# Patient Record
Sex: MALE | Race: WHITE | NOT HISPANIC OR LATINO
[De-identification: names, ages, dates, MRNs, and addresses within clinical notes are randomized per-mention and may not be internally consistent; named-entity substitution may affect disease eponyms.]

---

## 2017-02-15 ENCOUNTER — APPOINTMENT (OUTPATIENT)
Dept: INTERNAL MEDICINE | Facility: CLINIC | Age: 62
End: 2017-02-15

## 2017-02-15 VITALS
WEIGHT: 208 LBS | OXYGEN SATURATION: 96 % | HEART RATE: 69 BPM | HEIGHT: 68 IN | TEMPERATURE: 98.1 F | BODY MASS INDEX: 31.52 KG/M2 | DIASTOLIC BLOOD PRESSURE: 80 MMHG | SYSTOLIC BLOOD PRESSURE: 126 MMHG

## 2017-02-15 DIAGNOSIS — Z80.3 FAMILY HISTORY OF MALIGNANT NEOPLASM OF BREAST: ICD-10-CM

## 2017-02-15 DIAGNOSIS — Z87.891 PERSONAL HISTORY OF NICOTINE DEPENDENCE: ICD-10-CM

## 2017-02-15 DIAGNOSIS — Z78.9 OTHER SPECIFIED HEALTH STATUS: ICD-10-CM

## 2017-02-15 DIAGNOSIS — Z80.7 FAMILY HISTORY OF OTHER MALIGNANT NEOPLASMS OF LYMPHOID, HEMATOPOIETIC AND RELATED TISSUES: ICD-10-CM

## 2017-02-15 DIAGNOSIS — Z83.79 FAMILY HISTORY OF OTHER DISEASES OF THE DIGESTIVE SYSTEM: ICD-10-CM

## 2017-02-16 LAB
25(OH)D3 SERPL-MCNC: 40.5 NG/ML
ALBUMIN SERPL ELPH-MCNC: 4.4 G/DL
ALP BLD-CCNC: 69 U/L
ALT SERPL-CCNC: 29 U/L
ANION GAP SERPL CALC-SCNC: 16 MMOL/L
APPEARANCE: CLEAR
AST SERPL-CCNC: 22 U/L
BASOPHILS # BLD AUTO: 0.02 K/UL
BASOPHILS NFR BLD AUTO: 0.3 %
BILIRUB SERPL-MCNC: 0.4 MG/DL
BILIRUBIN URINE: NEGATIVE
BLOOD URINE: NEGATIVE
BUN SERPL-MCNC: 14 MG/DL
CALCIUM SERPL-MCNC: 10.5 MG/DL
CHLORIDE SERPL-SCNC: 100 MMOL/L
CHOLEST SERPL-MCNC: 155 MG/DL
CHOLEST/HDLC SERPL: 2 RATIO
CO2 SERPL-SCNC: 24 MMOL/L
COLOR: YELLOW
CREAT SERPL-MCNC: 0.93 MG/DL
EOSINOPHIL # BLD AUTO: 0.15 K/UL
EOSINOPHIL NFR BLD AUTO: 2.6 %
GLUCOSE QUALITATIVE U: NORMAL MG/DL
GLUCOSE SERPL-MCNC: 88 MG/DL
HBA1C MFR BLD HPLC: 5.5 %
HCT VFR BLD CALC: 43.2 %
HDLC SERPL-MCNC: 77 MG/DL
HGB BLD-MCNC: 14.8 G/DL
IMM GRANULOCYTES NFR BLD AUTO: 0 %
KETONES URINE: NEGATIVE
LDLC SERPL CALC-MCNC: 65 MG/DL
LEUKOCYTE ESTERASE URINE: NEGATIVE
LYMPHOCYTES # BLD AUTO: 2.24 K/UL
LYMPHOCYTES NFR BLD AUTO: 38.6 %
MAN DIFF?: NORMAL
MCHC RBC-ENTMCNC: 32.4 PG
MCHC RBC-ENTMCNC: 34.3 GM/DL
MCV RBC AUTO: 94.5 FL
MONOCYTES # BLD AUTO: 0.56 K/UL
MONOCYTES NFR BLD AUTO: 9.7 %
NEUTROPHILS # BLD AUTO: 2.83 K/UL
NEUTROPHILS NFR BLD AUTO: 48.8 %
NITRITE URINE: NEGATIVE
PH URINE: 6.5
PLATELET # BLD AUTO: 278 K/UL
POTASSIUM SERPL-SCNC: 4.9 MMOL/L
PROT SERPL-MCNC: 6.7 G/DL
PROTEIN URINE: NEGATIVE MG/DL
PSA SERPL-MCNC: 1.2 NG/ML
RBC # BLD: 4.57 M/UL
RBC # FLD: 13.6 %
SODIUM SERPL-SCNC: 141 MMOL/L
SPECIFIC GRAVITY URINE: 1.02
TRIGL SERPL-MCNC: 67 MG/DL
UROBILINOGEN URINE: NORMAL MG/DL
WBC # FLD AUTO: 5.8 K/UL

## 2017-02-17 ENCOUNTER — TRANSCRIPTION ENCOUNTER (OUTPATIENT)
Age: 62
End: 2017-02-17

## 2018-02-20 ENCOUNTER — RX RENEWAL (OUTPATIENT)
Age: 63
End: 2018-02-20

## 2018-03-16 ENCOUNTER — APPOINTMENT (OUTPATIENT)
Dept: INTERNAL MEDICINE | Facility: CLINIC | Age: 63
End: 2018-03-16
Payer: COMMERCIAL

## 2018-03-16 VITALS
HEIGHT: 68 IN | TEMPERATURE: 97.7 F | OXYGEN SATURATION: 96 % | SYSTOLIC BLOOD PRESSURE: 131 MMHG | DIASTOLIC BLOOD PRESSURE: 81 MMHG | BODY MASS INDEX: 28.79 KG/M2 | WEIGHT: 190 LBS | HEART RATE: 69 BPM

## 2018-03-16 PROCEDURE — 99396 PREV VISIT EST AGE 40-64: CPT | Mod: 25

## 2018-03-16 PROCEDURE — 36415 COLL VENOUS BLD VENIPUNCTURE: CPT

## 2018-03-16 PROCEDURE — 93000 ELECTROCARDIOGRAM COMPLETE: CPT

## 2018-05-24 ENCOUNTER — RX RENEWAL (OUTPATIENT)
Age: 63
End: 2018-05-24

## 2018-11-03 ENCOUNTER — TRANSCRIPTION ENCOUNTER (OUTPATIENT)
Age: 63
End: 2018-11-03

## 2018-11-20 ENCOUNTER — APPOINTMENT (OUTPATIENT)
Dept: PULMONOLOGY | Facility: CLINIC | Age: 63
End: 2018-11-20
Payer: COMMERCIAL

## 2018-11-20 VITALS
SYSTOLIC BLOOD PRESSURE: 140 MMHG | DIASTOLIC BLOOD PRESSURE: 75 MMHG | TEMPERATURE: 97.5 F | HEIGHT: 68 IN | HEART RATE: 85 BPM | BODY MASS INDEX: 28.79 KG/M2 | WEIGHT: 190 LBS | OXYGEN SATURATION: 97 %

## 2018-11-20 DIAGNOSIS — Z80.1 FAMILY HISTORY OF MALIGNANT NEOPLASM OF TRACHEA, BRONCHUS AND LUNG: ICD-10-CM

## 2018-11-20 DIAGNOSIS — J44.9 CHRONIC OBSTRUCTIVE PULMONARY DISEASE, UNSPECIFIED: ICD-10-CM

## 2018-11-20 DIAGNOSIS — Z82.5 FAMILY HISTORY OF ASTHMA AND OTHER CHRONIC LOWER RESPIRATORY DISEASES: ICD-10-CM

## 2018-11-20 DIAGNOSIS — Z85.038 PERSONAL HISTORY OF OTHER MALIGNANT NEOPLASM OF LARGE INTESTINE: ICD-10-CM

## 2018-11-20 PROCEDURE — 71046 X-RAY EXAM CHEST 2 VIEWS: CPT

## 2018-11-20 PROCEDURE — 95012 NITRIC OXIDE EXP GAS DETER: CPT

## 2018-11-20 PROCEDURE — 99244 OFF/OP CNSLTJ NEW/EST MOD 40: CPT | Mod: 25

## 2018-11-20 PROCEDURE — G0008: CPT

## 2018-11-20 PROCEDURE — 94010 BREATHING CAPACITY TEST: CPT

## 2018-11-20 PROCEDURE — 90686 IIV4 VACC NO PRSV 0.5 ML IM: CPT

## 2018-12-14 ENCOUNTER — APPOINTMENT (OUTPATIENT)
Dept: INTERNAL MEDICINE | Facility: CLINIC | Age: 63
End: 2018-12-14

## 2018-12-20 ENCOUNTER — APPOINTMENT (OUTPATIENT)
Dept: PULMONOLOGY | Facility: CLINIC | Age: 63
End: 2018-12-20

## 2019-01-23 ENCOUNTER — APPOINTMENT (OUTPATIENT)
Dept: PULMONOLOGY | Facility: CLINIC | Age: 64
End: 2019-01-23

## 2020-04-30 ENCOUNTER — TRANSCRIPTION ENCOUNTER (OUTPATIENT)
Age: 65
End: 2020-04-30

## 2021-04-14 ENCOUNTER — APPOINTMENT (OUTPATIENT)
Dept: INTERNAL MEDICINE | Facility: CLINIC | Age: 66
End: 2021-04-14
Payer: MEDICARE

## 2021-04-14 VITALS
HEIGHT: 68 IN | BODY MASS INDEX: 30.16 KG/M2 | SYSTOLIC BLOOD PRESSURE: 130 MMHG | DIASTOLIC BLOOD PRESSURE: 84 MMHG | TEMPERATURE: 97.4 F | OXYGEN SATURATION: 97 % | HEART RATE: 81 BPM | WEIGHT: 199 LBS

## 2021-04-14 DIAGNOSIS — E78.5 HYPERLIPIDEMIA, UNSPECIFIED: ICD-10-CM

## 2021-04-14 PROCEDURE — 99072 ADDL SUPL MATRL&STAF TM PHE: CPT

## 2021-04-14 PROCEDURE — 93000 ELECTROCARDIOGRAM COMPLETE: CPT

## 2021-04-14 PROCEDURE — 99387 INIT PM E/M NEW PAT 65+ YRS: CPT | Mod: GY

## 2021-04-14 NOTE — PLAN
[FreeTextEntry1] : wellness complete\par labs to be done in June\par  ekg nsr\par try to get back into regular exercise - bt n feet.

## 2021-04-14 NOTE — HISTORY OF PRESENT ILLNESS
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 \par s/p covid vaccine x2.  \par With colon ca history - due now in 5 years with Dr francesca Song

## 2022-05-01 ENCOUNTER — RX RENEWAL (OUTPATIENT)
Age: 67
End: 2022-05-01

## 2022-05-03 ENCOUNTER — RX RENEWAL (OUTPATIENT)
Age: 67
End: 2022-05-03

## 2022-06-10 ENCOUNTER — APPOINTMENT (OUTPATIENT)
Dept: INTERNAL MEDICINE | Facility: CLINIC | Age: 67
End: 2022-06-10
Payer: COMMERCIAL

## 2022-06-10 VITALS
SYSTOLIC BLOOD PRESSURE: 120 MMHG | WEIGHT: 177 LBS | DIASTOLIC BLOOD PRESSURE: 70 MMHG | TEMPERATURE: 97.8 F | BODY MASS INDEX: 26.83 KG/M2 | OXYGEN SATURATION: 97 % | HEART RATE: 84 BPM | HEIGHT: 68 IN

## 2022-06-10 DIAGNOSIS — I10 ESSENTIAL (PRIMARY) HYPERTENSION: ICD-10-CM

## 2022-06-10 DIAGNOSIS — Z00.00 ENCOUNTER FOR GENERAL ADULT MEDICAL EXAMINATION W/OUT ABNORMAL FINDINGS: ICD-10-CM

## 2022-06-10 PROCEDURE — 93000 ELECTROCARDIOGRAM COMPLETE: CPT

## 2022-06-10 PROCEDURE — 99397 PER PM REEVAL EST PAT 65+ YR: CPT

## 2022-06-10 RX ORDER — ALBUTEROL SULFATE 90 UG/1
108 (90 BASE) INHALANT RESPIRATORY (INHALATION) EVERY 4 HOURS
Qty: 1 | Refills: 5 | Status: ACTIVE | COMMUNITY
Start: 2021-04-14 | End: 1900-01-01

## 2022-06-10 RX ORDER — SIMVASTATIN 40 MG/1
40 TABLET, FILM COATED ORAL
Qty: 90 | Refills: 3 | Status: ACTIVE | COMMUNITY
Start: 2018-05-24 | End: 1900-01-01

## 2022-06-10 RX ORDER — MOMETASONE FUROATE AND FORMOTEROL FUMARATE DIHYDRATE 100; 5 UG/1; UG/1
100-5 AEROSOL RESPIRATORY (INHALATION)
Qty: 1 | Refills: 2 | Status: DISCONTINUED | COMMUNITY
Start: 2018-11-20 | End: 2022-06-10

## 2022-06-10 NOTE — PHYSICAL EXAM
[No Acute Distress] : no acute distress [Well Nourished] : well nourished [Well Developed] : well developed [Well-Appearing] : well-appearing [Normal Sclera/Conjunctiva] : normal sclera/conjunctiva [PERRL] : pupils equal round and reactive to light [EOMI] : extraocular movements intact [Normal Outer Ear/Nose] : the outer ears and nose were normal in appearance [Normal Oropharynx] : the oropharynx was normal [No JVD] : no jugular venous distention [No Lymphadenopathy] : no lymphadenopathy [Supple] : supple [Thyroid Normal, No Nodules] : the thyroid was normal and there were no nodules present [No Respiratory Distress] : no respiratory distress  [No Accessory Muscle Use] : no accessory muscle use [Clear to Auscultation] : lungs were clear to auscultation bilaterally [Normal Rate] : normal rate  [Regular Rhythm] : with a regular rhythm [Normal S1, S2] : normal S1 and S2 [No Murmur] : no murmur heard [Pedal Pulses Present] : the pedal pulses are present [Soft] : abdomen soft [Non Tender] : non-tender [Non-distended] : non-distended [No Masses] : no abdominal mass palpated [No HSM] : no HSM [Normal Bowel Sounds] : normal bowel sounds [Normal Posterior Cervical Nodes] : no posterior cervical lymphadenopathy [Normal Anterior Cervical Nodes] : no anterior cervical lymphadenopathy [No CVA Tenderness] : no CVA  tenderness [No Spinal Tenderness] : no spinal tenderness [No Joint Swelling] : no joint swelling [Grossly Normal Strength/Tone] : grossly normal strength/tone [No Rash] : no rash [Coordination Grossly Intact] : coordination grossly intact [No Focal Deficits] : no focal deficits [Normal Gait] : normal gait [Normal Affect] : the affect was normal [Normal Insight/Judgement] : insight and judgment were intact [de-identified] : trace edema b/l le

## 2022-06-10 NOTE — HISTORY OF PRESENT ILLNESS
[FreeTextEntry1] : wellness [de-identified] : 66 yo m with hx of colon ca., HTN, HLD, asthma/copd here for wellness\par \par s/p covid vaccine x2.  \par With colon ca history - due now in 5 years with Dr francesca Song    \par Has Brav mutation. Seen at [facility] as well.\par asthma has been stable with rescue albuterol alone.   - once a week recently but normally once a month

## 2022-06-10 NOTE — PLAN
[FreeTextEntry1] : wellness complete\par labs another day  fasting\par  ekg nsr\par encourage prevnar 20 to be done at work. \par \par htn controlled cont arb\par  HLD cont statin\par asthma prn albuterol

## 2022-07-22 ENCOUNTER — NON-APPOINTMENT (OUTPATIENT)
Age: 67
End: 2022-07-22

## 2022-07-31 ENCOUNTER — NON-APPOINTMENT (OUTPATIENT)
Age: 67
End: 2022-07-31

## 2022-08-02 ENCOUNTER — NON-APPOINTMENT (OUTPATIENT)
Age: 67
End: 2022-08-02

## 2022-08-11 ENCOUNTER — APPOINTMENT (OUTPATIENT)
Dept: UROLOGY | Facility: CLINIC | Age: 67
End: 2022-08-11

## 2022-08-11 VITALS
HEART RATE: 73 BPM | HEIGHT: 68 IN | WEIGHT: 180 LBS | SYSTOLIC BLOOD PRESSURE: 122 MMHG | BODY MASS INDEX: 27.28 KG/M2 | DIASTOLIC BLOOD PRESSURE: 76 MMHG | TEMPERATURE: 97.3 F

## 2022-08-11 DIAGNOSIS — N32.2 VESICAL FISTULA, NOT ELSEWHERE CLASSIFIED: ICD-10-CM

## 2022-08-11 DIAGNOSIS — N40.0 BENIGN PROSTATIC HYPERPLASIA WITHOUT LOWER URINARY TRACT SYMPMS: ICD-10-CM

## 2022-08-11 PROCEDURE — 99204 OFFICE O/P NEW MOD 45 MIN: CPT

## 2022-08-11 NOTE — HISTORY OF PRESENT ILLNESS
[Currently Experiencing ___] :  [unfilled] [Urinary Urgency] : urinary urgency [Urinary Frequency] : urinary frequency [Nocturia] : nocturia [FreeTextEntry1] : 67 yr old male with PMH colon cancer, HTN, HLD. Referred by Dr. Quezada\par Presents today with complains of dark brown urine  with burning pain in June 2002\par Urine passing in anus,occasional fecal material in urine\par he was told by PCP for see urology for fistula \par Right scrotal swelling after colorectal surgery 2017\par Complains of urgency, frequency and nocturia 2-3X \par with weak urinary FOS\par PSA 1.2 Feb 2017 \par Denies fever or chills\par Denies gross hematuria, flank pain

## 2022-08-11 NOTE — ASSESSMENT
[FreeTextEntry1] : colovesical fistula\par Will send for CT Abd and pelvis PO IV contrast\par called and discussed with Moreno Zhang his previous colorectal surgeon\par will see patient for appointment\par we are avialable for cystorrhaphy as needed during surgery\par labs: UA, UC\par \par

## 2022-08-11 NOTE — PHYSICAL EXAM
[General Appearance - Well Developed] : well developed [General Appearance - Well Nourished] : well nourished [Normal Appearance] : normal appearance [Well Groomed] : well groomed [General Appearance - In No Acute Distress] : no acute distress [Edema] : no peripheral edema [Respiration, Rhythm And Depth] : normal respiratory rhythm and effort [Exaggerated Use Of Accessory Muscles For Inspiration] : no accessory muscle use [Abdomen Soft] : soft [Abdomen Tenderness] : non-tender [Costovertebral Angle Tenderness] : no ~M costovertebral angle tenderness [Urethral Meatus] : meatus normal [Penis Abnormality] : normal uncircumcised penis [Urinary Bladder Findings] : the bladder was normal on palpation [Scrotum] : the scrotum was normal [Testes Mass (___cm)] : there were no testicular masses [Normal Station and Gait] : the gait and station were normal for the patient's age [] : no rash [No Focal Deficits] : no focal deficits [Oriented To Time, Place, And Person] : oriented to person, place, and time [Affect] : the affect was normal [Mood] : the mood was normal [Not Anxious] : not anxious [No Palpable Adenopathy] : no palpable adenopathy [FreeTextEntry1] : Right 100 cc scrotum - hydrocele?, left 10 cc testis

## 2022-08-22 ENCOUNTER — APPOINTMENT (OUTPATIENT)
Dept: CT IMAGING | Facility: HOSPITAL | Age: 67
End: 2022-08-22

## 2022-08-22 ENCOUNTER — OUTPATIENT (OUTPATIENT)
Dept: OUTPATIENT SERVICES | Facility: HOSPITAL | Age: 67
LOS: 1 days | End: 2022-08-22
Payer: COMMERCIAL

## 2022-08-22 ENCOUNTER — RESULT REVIEW (OUTPATIENT)
Age: 67
End: 2022-08-22

## 2022-08-22 DIAGNOSIS — Z98.89 OTHER SPECIFIED POSTPROCEDURAL STATES: Chronic | ICD-10-CM

## 2022-08-22 LAB — POCT ISTAT CREATININE: 0.7 MG/DL — SIGNIFICANT CHANGE UP (ref 0.5–1.3)

## 2022-08-22 PROCEDURE — 82565 ASSAY OF CREATININE: CPT

## 2022-08-22 PROCEDURE — 74177 CT ABD & PELVIS W/CONTRAST: CPT | Mod: 26

## 2022-08-22 PROCEDURE — 74177 CT ABD & PELVIS W/CONTRAST: CPT

## 2022-08-30 ENCOUNTER — NON-APPOINTMENT (OUTPATIENT)
Age: 67
End: 2022-08-30

## 2022-09-19 ENCOUNTER — INPATIENT (INPATIENT)
Facility: HOSPITAL | Age: 67
LOS: 6 days | Discharge: HOME HEALTH | DRG: 872 | End: 2022-09-26
Attending: COLON & RECTAL SURGERY | Admitting: COLON & RECTAL SURGERY
Payer: COMMERCIAL

## 2022-09-19 VITALS
TEMPERATURE: 96 F | HEIGHT: 68 IN | SYSTOLIC BLOOD PRESSURE: 117 MMHG | DIASTOLIC BLOOD PRESSURE: 56 MMHG | OXYGEN SATURATION: 98 % | RESPIRATION RATE: 20 BRPM | HEART RATE: 95 BPM | WEIGHT: 169.98 LBS

## 2022-09-19 DIAGNOSIS — Z98.89 OTHER SPECIFIED POSTPROCEDURAL STATES: Chronic | ICD-10-CM

## 2022-09-19 LAB
ALBUMIN SERPL ELPH-MCNC: 3.1 G/DL — LOW (ref 3.3–5)
ALP SERPL-CCNC: 86 U/L — SIGNIFICANT CHANGE UP (ref 40–120)
ALT FLD-CCNC: 15 U/L — SIGNIFICANT CHANGE UP (ref 10–45)
ANION GAP SERPL CALC-SCNC: 11 MMOL/L — SIGNIFICANT CHANGE UP (ref 5–17)
ANISOCYTOSIS BLD QL: SLIGHT — SIGNIFICANT CHANGE UP
APTT BLD: 25.5 SEC — LOW (ref 27.5–35.5)
AST SERPL-CCNC: 36 U/L — SIGNIFICANT CHANGE UP (ref 10–40)
BASE EXCESS BLDV CALC-SCNC: 0.5 MMOL/L — SIGNIFICANT CHANGE UP (ref -2–3)
BASOPHILS # BLD AUTO: 0 K/UL — SIGNIFICANT CHANGE UP (ref 0–0.2)
BASOPHILS NFR BLD AUTO: 0 % — SIGNIFICANT CHANGE UP (ref 0–2)
BILIRUB SERPL-MCNC: 0.6 MG/DL — SIGNIFICANT CHANGE UP (ref 0.2–1.2)
BUN SERPL-MCNC: 24 MG/DL — HIGH (ref 7–23)
BURR CELLS BLD QL SMEAR: SLIGHT — SIGNIFICANT CHANGE UP
C DIFF GDH STL QL: NEGATIVE — SIGNIFICANT CHANGE UP
C DIFF GDH STL QL: SIGNIFICANT CHANGE UP
CALCIUM SERPL-MCNC: 9.3 MG/DL — SIGNIFICANT CHANGE UP (ref 8.4–10.5)
CHLORIDE SERPL-SCNC: 99 MMOL/L — SIGNIFICANT CHANGE UP (ref 96–108)
CO2 BLDV-SCNC: 26.7 MMOL/L — HIGH (ref 22–26)
CO2 SERPL-SCNC: 24 MMOL/L — SIGNIFICANT CHANGE UP (ref 22–31)
CREAT SERPL-MCNC: 0.73 MG/DL — SIGNIFICANT CHANGE UP (ref 0.5–1.3)
EGFR: 99 ML/MIN/1.73M2 — SIGNIFICANT CHANGE UP
ELLIPTOCYTES BLD QL SMEAR: SLIGHT — SIGNIFICANT CHANGE UP
EOSINOPHIL # BLD AUTO: 0 K/UL — SIGNIFICANT CHANGE UP (ref 0–0.5)
EOSINOPHIL NFR BLD AUTO: 0 % — SIGNIFICANT CHANGE UP (ref 0–6)
GI PCR PANEL: SIGNIFICANT CHANGE UP
GIANT PLATELETS BLD QL SMEAR: PRESENT — SIGNIFICANT CHANGE UP
GLUCOSE SERPL-MCNC: 142 MG/DL — HIGH (ref 70–99)
HCO3 BLDV-SCNC: 25 MMOL/L — SIGNIFICANT CHANGE UP (ref 22–29)
HCT VFR BLD CALC: 28.3 % — LOW (ref 39–50)
HGB BLD-MCNC: 9 G/DL — LOW (ref 13–17)
INR BLD: 1.37 — HIGH (ref 0.88–1.16)
LACTATE SERPL-SCNC: 1.2 MMOL/L — SIGNIFICANT CHANGE UP (ref 0.5–2)
LACTATE SERPL-SCNC: 2.2 MMOL/L — HIGH (ref 0.5–2)
LIDOCAIN IGE QN: 15 U/L — SIGNIFICANT CHANGE UP (ref 7–60)
LYMPHOCYTES # BLD AUTO: 0.3 K/UL — LOW (ref 1–3.3)
LYMPHOCYTES # BLD AUTO: 1.7 % — LOW (ref 13–44)
MANUAL SMEAR VERIFICATION: SIGNIFICANT CHANGE UP
MCHC RBC-ENTMCNC: 24.4 PG — LOW (ref 27–34)
MCHC RBC-ENTMCNC: 31.8 GM/DL — LOW (ref 32–36)
MCV RBC AUTO: 76.7 FL — LOW (ref 80–100)
MICROCYTES BLD QL: SLIGHT — SIGNIFICANT CHANGE UP
MONOCYTES # BLD AUTO: 0.91 K/UL — HIGH (ref 0–0.9)
MONOCYTES NFR BLD AUTO: 5.2 % — SIGNIFICANT CHANGE UP (ref 2–14)
NEUTROPHILS # BLD AUTO: 16.34 K/UL — HIGH (ref 1.8–7.4)
NEUTROPHILS NFR BLD AUTO: 89.6 % — HIGH (ref 43–77)
NEUTS BAND # BLD: 3.5 % — SIGNIFICANT CHANGE UP (ref 0–8)
OVALOCYTES BLD QL SMEAR: SLIGHT — SIGNIFICANT CHANGE UP
PCO2 BLDV: 41 MMHG — LOW (ref 42–55)
PH BLDV: 7.4 — SIGNIFICANT CHANGE UP (ref 7.32–7.43)
PLAT MORPH BLD: NORMAL — SIGNIFICANT CHANGE UP
PLATELET # BLD AUTO: 231 K/UL — SIGNIFICANT CHANGE UP (ref 150–400)
PO2 BLDV: <33 MMHG — SIGNIFICANT CHANGE UP (ref 25–45)
POIKILOCYTOSIS BLD QL AUTO: SLIGHT — SIGNIFICANT CHANGE UP
POLYCHROMASIA BLD QL SMEAR: SLIGHT — SIGNIFICANT CHANGE UP
POTASSIUM SERPL-MCNC: 4 MMOL/L — SIGNIFICANT CHANGE UP (ref 3.5–5.3)
POTASSIUM SERPL-SCNC: 4 MMOL/L — SIGNIFICANT CHANGE UP (ref 3.5–5.3)
PROT SERPL-MCNC: 5.6 G/DL — LOW (ref 6–8.3)
PROTHROM AB SERPL-ACNC: 16.3 SEC — HIGH (ref 10.5–13.4)
RBC # BLD: 3.69 M/UL — LOW (ref 4.2–5.8)
RBC # FLD: 15.9 % — HIGH (ref 10.3–14.5)
RBC BLD AUTO: ABNORMAL
SAO2 % BLDV: 43.4 % — LOW (ref 67–88)
SARS-COV-2 RNA SPEC QL NAA+PROBE: NEGATIVE — SIGNIFICANT CHANGE UP
SODIUM SERPL-SCNC: 134 MMOL/L — LOW (ref 135–145)
TROPONIN T SERPL-MCNC: <0.01 NG/ML — SIGNIFICANT CHANGE UP (ref 0–0.01)
TSH SERPL-MCNC: 1.3 UIU/ML — SIGNIFICANT CHANGE UP (ref 0.27–4.2)
WBC # BLD: 17.55 K/UL — HIGH (ref 3.8–10.5)
WBC # FLD AUTO: 17.55 K/UL — HIGH (ref 3.8–10.5)

## 2022-09-19 PROCEDURE — 93010 ELECTROCARDIOGRAM REPORT: CPT

## 2022-09-19 PROCEDURE — 99291 CRITICAL CARE FIRST HOUR: CPT

## 2022-09-19 PROCEDURE — 74177 CT ABD & PELVIS W/CONTRAST: CPT | Mod: 26,MA

## 2022-09-19 PROCEDURE — 71045 X-RAY EXAM CHEST 1 VIEW: CPT | Mod: 26

## 2022-09-19 RX ORDER — METRONIDAZOLE 500 MG
500 TABLET ORAL EVERY 8 HOURS
Refills: 0 | Status: COMPLETED | OUTPATIENT
Start: 2022-09-19 | End: 2022-09-26

## 2022-09-19 RX ORDER — ACETAMINOPHEN 500 MG
975 TABLET ORAL ONCE
Refills: 0 | Status: COMPLETED | OUTPATIENT
Start: 2022-09-19 | End: 2022-09-19

## 2022-09-19 RX ORDER — DIATRIZOATE MEGLUMINE 180 MG/ML
30 INJECTION, SOLUTION INTRAVESICAL ONCE
Refills: 0 | Status: COMPLETED | OUTPATIENT
Start: 2022-09-19 | End: 2022-09-19

## 2022-09-19 RX ORDER — SODIUM CHLORIDE 9 MG/ML
1000 INJECTION INTRAMUSCULAR; INTRAVENOUS; SUBCUTANEOUS ONCE
Refills: 0 | Status: DISCONTINUED | OUTPATIENT
Start: 2022-09-19 | End: 2022-09-19

## 2022-09-19 RX ORDER — METRONIDAZOLE 500 MG
500 TABLET ORAL ONCE
Refills: 0 | Status: COMPLETED | OUTPATIENT
Start: 2022-09-19 | End: 2022-09-19

## 2022-09-19 RX ORDER — HEPARIN SODIUM 5000 [USP'U]/ML
5000 INJECTION INTRAVENOUS; SUBCUTANEOUS EVERY 8 HOURS
Refills: 0 | Status: DISCONTINUED | OUTPATIENT
Start: 2022-09-19 | End: 2022-09-26

## 2022-09-19 RX ORDER — CEFTRIAXONE 500 MG/1
1000 INJECTION, POWDER, FOR SOLUTION INTRAMUSCULAR; INTRAVENOUS ONCE
Refills: 0 | Status: COMPLETED | OUTPATIENT
Start: 2022-09-19 | End: 2022-09-19

## 2022-09-19 RX ORDER — SODIUM CHLORIDE 9 MG/ML
2400 INJECTION INTRAMUSCULAR; INTRAVENOUS; SUBCUTANEOUS ONCE
Refills: 0 | Status: COMPLETED | OUTPATIENT
Start: 2022-09-19 | End: 2022-09-19

## 2022-09-19 RX ORDER — ACETAMINOPHEN 500 MG
650 TABLET ORAL EVERY 6 HOURS
Refills: 0 | Status: DISCONTINUED | OUTPATIENT
Start: 2022-09-19 | End: 2022-09-26

## 2022-09-19 RX ORDER — ALBUTEROL 90 UG/1
2 AEROSOL, METERED ORAL ONCE
Refills: 0 | Status: COMPLETED | OUTPATIENT
Start: 2022-09-19 | End: 2022-09-19

## 2022-09-19 RX ORDER — ONDANSETRON 8 MG/1
4 TABLET, FILM COATED ORAL ONCE
Refills: 0 | Status: COMPLETED | OUTPATIENT
Start: 2022-09-19 | End: 2022-09-19

## 2022-09-19 RX ORDER — CEFTRIAXONE 500 MG/1
1000 INJECTION, POWDER, FOR SOLUTION INTRAMUSCULAR; INTRAVENOUS EVERY 24 HOURS
Refills: 0 | Status: COMPLETED | OUTPATIENT
Start: 2022-09-20 | End: 2022-09-26

## 2022-09-19 RX ORDER — ONDANSETRON 8 MG/1
4 TABLET, FILM COATED ORAL EVERY 6 HOURS
Refills: 0 | Status: DISCONTINUED | OUTPATIENT
Start: 2022-09-19 | End: 2022-09-26

## 2022-09-19 RX ADMIN — Medication 500 MILLIGRAM(S): at 19:05

## 2022-09-19 RX ADMIN — Medication 975 MILLIGRAM(S): at 18:05

## 2022-09-19 RX ADMIN — CEFTRIAXONE 1000 MILLIGRAM(S): 500 INJECTION, POWDER, FOR SOLUTION INTRAMUSCULAR; INTRAVENOUS at 18:20

## 2022-09-19 RX ADMIN — ONDANSETRON 4 MILLIGRAM(S): 8 TABLET, FILM COATED ORAL at 17:57

## 2022-09-19 RX ADMIN — ALBUTEROL 2 PUFF(S): 90 AEROSOL, METERED ORAL at 17:58

## 2022-09-19 RX ADMIN — CEFTRIAXONE 100 MILLIGRAM(S): 500 INJECTION, POWDER, FOR SOLUTION INTRAMUSCULAR; INTRAVENOUS at 17:49

## 2022-09-19 RX ADMIN — DIATRIZOATE MEGLUMINE 30 MILLILITER(S): 180 INJECTION, SOLUTION INTRAVESICAL at 18:22

## 2022-09-19 RX ADMIN — SODIUM CHLORIDE 2400 MILLILITER(S): 9 INJECTION INTRAMUSCULAR; INTRAVENOUS; SUBCUTANEOUS at 17:49

## 2022-09-19 RX ADMIN — Medication 100 MILLIGRAM(S): at 18:06

## 2022-09-19 NOTE — ED ADULT TRIAGE NOTE - CHIEF COMPLAINT QUOTE
Pt brought in by EMS, states he was sitting on the toilet ~1400 this afternoon when he got dizzy and placed himself on the bathroom floor. Pt states he was too weak to get up from the floor and laid there until his wife came home and called EMS. On EMS arrival, blood glucose FS was critically low. Given ~50mL of Dextrose 10% On arrival to ED, . Denies chest pain, shortness of breath, fevers, head injury.

## 2022-09-19 NOTE — ED PROVIDER NOTE - CLINICAL SUMMARY MEDICAL DECISION MAKING FREE TEXT BOX
Pt biba for weakness, glu "low" improved to 151 after d10 w ems.  No h/o dm. Temp low - suspect 2/2 hypoglycemia but ? sepsis causing both.  Pt reports chronic diarrhea 2/2 fistula since July w/o sig change today but could be source for sepsis.  Pt also reports poor po's today but + po last pm - ? 2/2 poor po intake.  No cp, ekg w/o ischemic changes - no sig concern for acs.  Pt w h/o asthma, denies sob but + mild wheezing on exam - will give mdi and check cxr.   Plan labs, freq fs, cx's, ct abd; pt given food.  Reassess.

## 2022-09-19 NOTE — ED PROVIDER NOTE - CARE PLAN
Principal Discharge DX:	Sepsis  Secondary Diagnosis:	Hypoglycemia  Secondary Diagnosis:	Diarrhea   1

## 2022-09-19 NOTE — H&P ADULT - NSHPSOCIALHISTORY_GEN_ALL_CORE
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

## 2022-09-19 NOTE — ED PROVIDER NOTE - NSICDXPASTMEDICALHX_GEN_ALL_CORE_FT
PAST MEDICAL HISTORY:  High cholesterol     History of asthma     Hypertension     Intestinal obstruction     Malignant neoplasm of hepatic flexure

## 2022-09-19 NOTE — H&P ADULT - NSHPPHYSICALEXAM_GEN_ALL_CORE
LOS: 1d    VITALS:   T(C): 35.8 (09-19-22 @ 17:27), Max: 35.8 (09-19-22 @ 17:27)  HR: 90 (09-19-22 @ 18:43) (82 - 95)  BP: 105/69 (09-19-22 @ 18:43) (102/55 - 117/56)  RR: 18 (09-19-22 @ 18:43) (18 - 20)  SpO2: 98% (09-19-22 @ 18:43) (96% - 98%)    GENERAL: NAD, lying in bed comfortably  HEAD:  Atraumatic, Normocephalic  ENT: Moist mucous membranes  NECK: Supple, No JVD  CHEST/LUNG: Clear to auscultation bilaterally; Unlabored respirations  HEART: Regular rate and rhythm;   ABDOMEN: Soft, nontender, nondistended, well healing scars  EXTREMITIES:  WWP, No clubbing, cyanosis, or edema  NERVOUS SYSTEM:  A&Ox3, no focal deficits

## 2022-09-19 NOTE — ED PROVIDER NOTE - PHYSICAL EXAMINATION
VITAL SIGNS: I have reviewed nursing notes and confirm.  CONSTITUTIONAL: Well-developed; well-nourished; ill appearing, diaphoretic, weak  SKIN:  warm and dry, no acute rash.   HEAD:  normocephalic, atraumatic.  EYES: PERRL, EOM intact; conjunctiva and sclera clear.  ENT: No nasal discharge; airway clear.   NECK: Supple; non tender.  CARD: S1, S2 normal; no murmurs, gallops, or rubs. Regular rate and rhythm.   RESP:  Clear to auscultation b/l, no wheezes, rales or rhonchi.  ABD: Normal bowel sounds; soft; non-distended; non-tender; no guarding/ rebound.  MSK: Normal ROM. No clubbing, cyanosis or edema. no ttp bilat le  NEURO: Alert, oriented, grossly unremarkable  PSYCH: Cooperative, mood and affect appropriate.

## 2022-09-19 NOTE — ED PROVIDER NOTE - NSICDXFAMILYHX_GEN_ALL_CORE_FT
FAMILY HISTORY:  Father  Still living? Unknown  FH: bladder cancer, Age at diagnosis: Age Unknown  FH: kidney cancer, Age at diagnosis: Age Unknown  FH: liver cancer, Age at diagnosis: Age Unknown  FH: prostate cancer, Age at diagnosis: Age Unknown    Mother  Still living? Unknown  FH: breast cancer, Age at diagnosis: Age Unknown    Sibling  Still living? Unknown  FH: breast cancer, Age at diagnosis: Age Unknown  FH: multiple myeloma, Age at diagnosis: Age Unknown

## 2022-09-19 NOTE — H&P ADULT - NSHPLABSRESULTS_GEN_ALL_CORE
.  LABS:                         9.0    17.55 )-----------( 231      ( 19 Sep 2022 17:51 )             28.3     09-19    134<L>  |  99  |  24<H>  ----------------------------<  142<H>  4.0   |  24  |  0.73    Ca    9.3      19 Sep 2022 17:51    TPro  5.6<L>  /  Alb  3.1<L>  /  TBili  0.6  /  DBili  x   /  AST  36  /  ALT  15  /  AlkPhos  86  09-19    PT/INR - ( 19 Sep 2022 17:51 )   PT: 16.3 sec;   INR: 1.37          PTT - ( 19 Sep 2022 17:51 )  PTT:25.5 sec      Lactate, Blood: 1.2 mmol/L (09-19 @ 20:41)  Lactate, Blood: 2.2 mmol/L (09-19 @ 17:51)      RADIOLOGY, EKG & ADDITIONAL TESTS: Reviewed.   COMPARISON:  CT ABDOMEN AND PELVIS WITH ORAL CONTRAST WITH IV CONTRAST 8/22/2022 11:06 AM    FINDINGS:  Pleural spaces: Trace bilateral pleural effusions.    Liver: Indeterminate 1.4 cm hypoattenuating hepatic lesion does not meet strict  CT criteria for a cyst and is indeterminate.  Gallbladder and bile ducts: Normal. No calcified stones. No ductal dilation.  Pancreas: Unremarkable.  Spleen: Splenomegaly.  Adrenal glands: Normal. No mass.  Kidneys and ureters: There is minimal bilateral hydroureteronephrosis without  obstructive urolithiasis which appears to be secondary to compression of the  distal ureters by the aforementioned bladder outpouching/diverticula. Kidneys  otherwise unremarkable.  Stomach and bowel: As depicted on prior study there is a large colovesical  fistula. Urinary bladder is diffusely thickened and inflamed compatible with  cystitis. There are numerous outpouchings of the urinary bladder which may be  incomplete fistula tracks bladder diverticula. Large amount of non dependent  intraluminal bladder gas. Numerous nonobstructing bladder stones. No  pneumatosis or portal/mesenteric venous gas. No intestinal obstruction. Small  volume ascites in the pelvis.  Appendix: Normal appendix.    Intraperitoneal space: No pneumoperitoneum or abscess.  Vasculature: SMV and SMA are patent as far as can be traced.  Lymph nodes: Unremarkable.  Urinary bladder: See "Stomach and bowel" finding.  Reproductive: Incompletely visualized large right scrotal hydrocele.  Bones/joints: Unremarkable. No acute fracture.  Soft tissues: Fat containing right inguinal hernia.    IMPRESSION:  1. As depicted on prior study there is a large colovesical fistula. Urinary  bladder is diffusely thickened and inflamed compatible with cystitis. There are  numerous outpouchings of the urinary bladder which may be incomplete fistula  tracks bladder diverticula. Large amount of non dependent intraluminal bladder  gas. Numerous nonobstructing bladder stones.  2. Splenomegaly.  3. Trace bilateral pleural effusions.  4. No intestinal obstruction. Small volume ascites in the pelvis.  5. Incompletely visualized large right scrotal hydrocele.

## 2022-09-19 NOTE — H&P ADULT - NSHPADDITIONALINFOADULT_GEN_ALL_CORE
CHIEF RESIDENT ADDENDUM  Agree with above. 67M PMHx colon cancer s/p laparoscopic right colectomy (2016), chronic diverticulitis with known colovesicular fistula since July 2022 with worsening fecaluria, called the ambulance for syncope, found to be hypoglycemic, got D10 by EMS and FSS improved to 151. Last cscope Friday, planned for sigmoidectomy next week. VSS, exam benign, abdomen soft, nontender, nondistended. WBC 18. CT with colovesicular fistula, thickened and chronically inflamed sigmoid, bladder distended and inflamed, consistent with cystitis, ascites in the pelvis. Smouldering diverticulitis, needs OR sooner than scheduled given chronic UTI/concern for impending urosepsis. Nutritional labs, ceftriaxone/Flagyl, urology consult. Will prep and add on for sigmoidectomy as soon as able. Discussed with attending surgeon.

## 2022-09-19 NOTE — H&P ADULT - HISTORY OF PRESENT ILLNESS
68 yo M with PMH HTN, HLD, asthma, diverticulitis with colovescicular fistula and colon cancer s/p R hemicolectomy (2016) presenting with weakness and hypoglycemia. Patient has been feeling progressively weaker for the past week. He reports that today he was going to bathroom and couldn't get up after sitting down. EMS was called and he was noted to have low blood glucose - he was given D10 en route to the hospital. Denies nausea/vomiting, denies dysuria but has been having worsening fecaluria and more urine in his stool. Denies blood in BMs. Denies fevers/chills. Patient saw Dr. Zhang last Friday for a colonoscopy - biopsies were taken and he was planned for surgery next week.  Endoscopy - never    ED: Afebrile, VSS, WBC 18 Hb 9, glucose 142. CT scan showing the chronic large colovesical fistula, bladder is diffusely thickened and inflamed, numerous outpouchings of the urinary bladder which may be incomplete fistula tracks bladder diverticula. 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

## 2022-09-19 NOTE — H&P ADULT - ASSESSMENT
68 yo M with PMH HTN, HLD, asthma, diverticulitis with colovesicular fistula and colon cancer s/p R hemicolectomy (2016) presenting with weakness and hypoglycemia. In the ED afebrile, VSS, WBC 18 Hb 9, glucose 142. CT with colovesicular fistula, thickened and chronically inflamed sigmoid, bladder distended and inflamed, consistent with cystitis, ascites in the pelvis. Patient will likely need surgery sooner than planned given chronic UTI/concern for impending urosepsis. Admitted for IV abx and plan for surgery.    CLD  Pain/Nausea PRN  Ceft/flagyl  HSQ/SCD  OOBA/IS  AM labs  Urology recs  OR this week  Plan discussed with chief resident and Dr. Zhang

## 2022-09-19 NOTE — ED PROVIDER NOTE - PROGRESS NOTE DETAILS
Labs w elevated wbc, + elevated lactate that improved, fs stable in ed, gi pcr/c diff neg, ct w ongoing fistula.  Surg and gu consulted after ct performed.  Per surg, pt tba to surg, regional, Dr Weeks; gu will cont to follow.

## 2022-09-19 NOTE — ED ADULT NURSE NOTE - OBJECTIVE STATEMENT
pt feeling weak while sitting on the toilet , sat on the ground and waited for his wife to come home , blood sugar was low , dextrose given by EMT , fs normal in ED

## 2022-09-19 NOTE — H&P ADULT - NSICDXPASTMEDICALHX_GEN_ALL_CORE_FT
PAST MEDICAL HISTORY:  High cholesterol     Hypertension     Intestinal obstruction     Malignant neoplasm of hepatic flexure

## 2022-09-20 DIAGNOSIS — Z90.49 ACQUIRED ABSENCE OF OTHER SPECIFIED PARTS OF DIGESTIVE TRACT: Chronic | ICD-10-CM

## 2022-09-20 LAB
-  COAGULASE NEGATIVE STAPHYLOCOCCUS: SIGNIFICANT CHANGE UP
ALBUMIN SERPL ELPH-MCNC: 2.4 G/DL — LOW (ref 3.3–5)
ALP SERPL-CCNC: 88 U/L — SIGNIFICANT CHANGE UP (ref 40–120)
ALT FLD-CCNC: 23 U/L — SIGNIFICANT CHANGE UP (ref 10–45)
ANION GAP SERPL CALC-SCNC: 9 MMOL/L — SIGNIFICANT CHANGE UP (ref 5–17)
APPEARANCE UR: CLEAR — SIGNIFICANT CHANGE UP
APTT BLD: 28.2 SEC — SIGNIFICANT CHANGE UP (ref 27.5–35.5)
AST SERPL-CCNC: 76 U/L — HIGH (ref 10–40)
BACTERIA # UR AUTO: ABNORMAL /HPF
BILIRUB SERPL-MCNC: 0.4 MG/DL — SIGNIFICANT CHANGE UP (ref 0.2–1.2)
BILIRUB UR-MCNC: NEGATIVE — SIGNIFICANT CHANGE UP
BLD GP AB SCN SERPL QL: NEGATIVE — SIGNIFICANT CHANGE UP
BUN SERPL-MCNC: 17 MG/DL — SIGNIFICANT CHANGE UP (ref 7–23)
CALCIUM SERPL-MCNC: 9.1 MG/DL — SIGNIFICANT CHANGE UP (ref 8.4–10.5)
CEA SERPL-MCNC: 5.7 NG/ML — HIGH (ref 0–3.8)
CHLORIDE SERPL-SCNC: 105 MMOL/L — SIGNIFICANT CHANGE UP (ref 96–108)
CO2 SERPL-SCNC: 23 MMOL/L — SIGNIFICANT CHANGE UP (ref 22–31)
COLOR SPEC: YELLOW — SIGNIFICANT CHANGE UP
COMMENT - URINE: SIGNIFICANT CHANGE UP
CREAT SERPL-MCNC: 0.64 MG/DL — SIGNIFICANT CHANGE UP (ref 0.5–1.3)
DIFF PNL FLD: ABNORMAL
EGFR: 103 ML/MIN/1.73M2 — SIGNIFICANT CHANGE UP
EPI CELLS # UR: SIGNIFICANT CHANGE UP /HPF (ref 0–5)
GLUCOSE SERPL-MCNC: 84 MG/DL — SIGNIFICANT CHANGE UP (ref 70–99)
GLUCOSE UR QL: NEGATIVE — SIGNIFICANT CHANGE UP
GRAM STN FLD: SIGNIFICANT CHANGE UP
HCT VFR BLD CALC: 27.5 % — LOW (ref 39–50)
HGB BLD-MCNC: 8.6 G/DL — LOW (ref 13–17)
INR BLD: 1.27 — HIGH (ref 0.88–1.16)
KETONES UR-MCNC: ABNORMAL MG/DL
LEUKOCYTE ESTERASE UR-ACNC: ABNORMAL
MAGNESIUM SERPL-MCNC: 2.7 MG/DL — HIGH (ref 1.6–2.6)
MCHC RBC-ENTMCNC: 24.2 PG — LOW (ref 27–34)
MCHC RBC-ENTMCNC: 31.3 GM/DL — LOW (ref 32–36)
MCV RBC AUTO: 77.2 FL — LOW (ref 80–100)
METHOD TYPE: SIGNIFICANT CHANGE UP
NITRITE UR-MCNC: NEGATIVE — SIGNIFICANT CHANGE UP
NRBC # BLD: 0 /100 WBCS — SIGNIFICANT CHANGE UP (ref 0–0)
PH UR: 6.5 — SIGNIFICANT CHANGE UP (ref 5–8)
PHOSPHATE SERPL-MCNC: 3.1 MG/DL — SIGNIFICANT CHANGE UP (ref 2.5–4.5)
PLATELET # BLD AUTO: 212 K/UL — SIGNIFICANT CHANGE UP (ref 150–400)
POTASSIUM SERPL-MCNC: 4 MMOL/L — SIGNIFICANT CHANGE UP (ref 3.5–5.3)
POTASSIUM SERPL-SCNC: 4 MMOL/L — SIGNIFICANT CHANGE UP (ref 3.5–5.3)
PREALB SERPL-MCNC: 4 MG/DL — LOW (ref 20–40)
PROT SERPL-MCNC: 5 G/DL — LOW (ref 6–8.3)
PROT UR-MCNC: 100 MG/DL
PROTHROM AB SERPL-ACNC: 15.1 SEC — HIGH (ref 10.5–13.4)
RBC # BLD: 3.56 M/UL — LOW (ref 4.2–5.8)
RBC # FLD: 16 % — HIGH (ref 10.3–14.5)
RBC CASTS # UR COMP ASSIST: > 10 /HPF
RH IG SCN BLD-IMP: POSITIVE — SIGNIFICANT CHANGE UP
SARS-COV-2 RNA SPEC QL NAA+PROBE: NEGATIVE — SIGNIFICANT CHANGE UP
SODIUM SERPL-SCNC: 137 MMOL/L — SIGNIFICANT CHANGE UP (ref 135–145)
SP GR SPEC: 1.02 — SIGNIFICANT CHANGE UP (ref 1–1.03)
UROBILINOGEN FLD QL: 2 E.U./DL
WBC # BLD: 10.29 K/UL — SIGNIFICANT CHANGE UP (ref 3.8–10.5)
WBC # FLD AUTO: 10.29 K/UL — SIGNIFICANT CHANGE UP (ref 3.8–10.5)
WBC UR QL: ABNORMAL /HPF

## 2022-09-20 PROCEDURE — 93010 ELECTROCARDIOGRAM REPORT: CPT

## 2022-09-20 PROCEDURE — 99254 IP/OBS CNSLTJ NEW/EST MOD 60: CPT | Mod: GC

## 2022-09-20 RX ORDER — PANTOPRAZOLE SODIUM 20 MG/1
40 TABLET, DELAYED RELEASE ORAL
Refills: 0 | Status: DISCONTINUED | OUTPATIENT
Start: 2022-09-20 | End: 2022-09-26

## 2022-09-20 RX ORDER — INFLUENZA VIRUS VACCINE 15; 15; 15; 15 UG/.5ML; UG/.5ML; UG/.5ML; UG/.5ML
0.7 SUSPENSION INTRAMUSCULAR ONCE
Refills: 0 | Status: DISCONTINUED | OUTPATIENT
Start: 2022-09-20 | End: 2022-09-26

## 2022-09-20 RX ORDER — SODIUM CHLORIDE 9 MG/ML
1000 INJECTION, SOLUTION INTRAVENOUS
Refills: 0 | Status: DISCONTINUED | OUTPATIENT
Start: 2022-09-20 | End: 2022-09-23

## 2022-09-20 RX ADMIN — CEFTRIAXONE 100 MILLIGRAM(S): 500 INJECTION, POWDER, FOR SOLUTION INTRAMUSCULAR; INTRAVENOUS at 18:44

## 2022-09-20 RX ADMIN — HEPARIN SODIUM 5000 UNIT(S): 5000 INJECTION INTRAVENOUS; SUBCUTANEOUS at 09:56

## 2022-09-20 RX ADMIN — HEPARIN SODIUM 5000 UNIT(S): 5000 INJECTION INTRAVENOUS; SUBCUTANEOUS at 01:06

## 2022-09-20 RX ADMIN — HEPARIN SODIUM 5000 UNIT(S): 5000 INJECTION INTRAVENOUS; SUBCUTANEOUS at 17:44

## 2022-09-20 RX ADMIN — Medication 100 MILLIGRAM(S): at 01:43

## 2022-09-20 RX ADMIN — Medication 100 MILLIGRAM(S): at 09:56

## 2022-09-20 RX ADMIN — Medication 100 MILLIGRAM(S): at 17:44

## 2022-09-20 NOTE — PRE-OP CHECKLIST - SELECT TESTS ORDERED
CBC/CMP/PT/PTT/Hepatic Function/Type and Screen/POCT Blood Glucose/COVID-19 CBC/CMP/PT/PTT/Hepatic Function/Type and Screen/Urinalysis/POCT Blood Glucose/COVID-19

## 2022-09-20 NOTE — PATIENT PROFILE ADULT - FALL HARM RISK - HARM RISK INTERVENTIONS

## 2022-09-21 LAB
ANION GAP SERPL CALC-SCNC: 10 MMOL/L — SIGNIFICANT CHANGE UP (ref 5–17)
APTT BLD: 27.1 SEC — LOW (ref 27.5–35.5)
BLD GP AB SCN SERPL QL: NEGATIVE — SIGNIFICANT CHANGE UP
BUN SERPL-MCNC: 19 MG/DL — SIGNIFICANT CHANGE UP (ref 7–23)
CALCIUM SERPL-MCNC: 9.2 MG/DL — SIGNIFICANT CHANGE UP (ref 8.4–10.5)
CHLORIDE SERPL-SCNC: 106 MMOL/L — SIGNIFICANT CHANGE UP (ref 96–108)
CO2 SERPL-SCNC: 23 MMOL/L — SIGNIFICANT CHANGE UP (ref 22–31)
CREAT SERPL-MCNC: 0.64 MG/DL — SIGNIFICANT CHANGE UP (ref 0.5–1.3)
CULTURE RESULTS: SIGNIFICANT CHANGE UP
EGFR: 103 ML/MIN/1.73M2 — SIGNIFICANT CHANGE UP
GLUCOSE SERPL-MCNC: 124 MG/DL — HIGH (ref 70–99)
GRAM STN FLD: SIGNIFICANT CHANGE UP
HCT VFR BLD CALC: 31.3 % — LOW (ref 39–50)
HGB BLD-MCNC: 9.9 G/DL — LOW (ref 13–17)
INR BLD: 1.21 — HIGH (ref 0.88–1.16)
MAGNESIUM SERPL-MCNC: 1.9 MG/DL — SIGNIFICANT CHANGE UP (ref 1.6–2.6)
MCHC RBC-ENTMCNC: 24.6 PG — LOW (ref 27–34)
MCHC RBC-ENTMCNC: 31.6 GM/DL — LOW (ref 32–36)
MCV RBC AUTO: 77.7 FL — LOW (ref 80–100)
NRBC # BLD: 0 /100 WBCS — SIGNIFICANT CHANGE UP (ref 0–0)
PHOSPHATE SERPL-MCNC: 2.5 MG/DL — SIGNIFICANT CHANGE UP (ref 2.5–4.5)
PLATELET # BLD AUTO: 253 K/UL — SIGNIFICANT CHANGE UP (ref 150–400)
POTASSIUM SERPL-MCNC: 4 MMOL/L — SIGNIFICANT CHANGE UP (ref 3.5–5.3)
POTASSIUM SERPL-SCNC: 4 MMOL/L — SIGNIFICANT CHANGE UP (ref 3.5–5.3)
PROTHROM AB SERPL-ACNC: 14.4 SEC — HIGH (ref 10.5–13.4)
RBC # BLD: 4.03 M/UL — LOW (ref 4.2–5.8)
RBC # FLD: 16.1 % — HIGH (ref 10.3–14.5)
RH IG SCN BLD-IMP: POSITIVE — SIGNIFICANT CHANGE UP
SARS-COV-2 RNA SPEC QL NAA+PROBE: SIGNIFICANT CHANGE UP
SODIUM SERPL-SCNC: 139 MMOL/L — SIGNIFICANT CHANGE UP (ref 135–145)
SPECIMEN SOURCE: SIGNIFICANT CHANGE UP
WBC # BLD: 11.3 K/UL — HIGH (ref 3.8–10.5)
WBC # FLD AUTO: 11.3 K/UL — HIGH (ref 3.8–10.5)

## 2022-09-21 PROCEDURE — 99254 IP/OBS CNSLTJ NEW/EST MOD 60: CPT

## 2022-09-21 RX ADMIN — HEPARIN SODIUM 5000 UNIT(S): 5000 INJECTION INTRAVENOUS; SUBCUTANEOUS at 00:51

## 2022-09-21 RX ADMIN — HEPARIN SODIUM 5000 UNIT(S): 5000 INJECTION INTRAVENOUS; SUBCUTANEOUS at 09:10

## 2022-09-21 RX ADMIN — SODIUM CHLORIDE 110 MILLILITER(S): 9 INJECTION, SOLUTION INTRAVENOUS at 00:48

## 2022-09-21 RX ADMIN — HEPARIN SODIUM 5000 UNIT(S): 5000 INJECTION INTRAVENOUS; SUBCUTANEOUS at 17:47

## 2022-09-21 RX ADMIN — SODIUM CHLORIDE 40 MILLILITER(S): 9 INJECTION, SOLUTION INTRAVENOUS at 10:25

## 2022-09-21 RX ADMIN — Medication 100 MILLIGRAM(S): at 00:48

## 2022-09-21 RX ADMIN — CEFTRIAXONE 100 MILLIGRAM(S): 500 INJECTION, POWDER, FOR SOLUTION INTRAMUSCULAR; INTRAVENOUS at 19:12

## 2022-09-21 RX ADMIN — PANTOPRAZOLE SODIUM 40 MILLIGRAM(S): 20 TABLET, DELAYED RELEASE ORAL at 06:50

## 2022-09-21 RX ADMIN — Medication 100 MILLIGRAM(S): at 17:48

## 2022-09-21 RX ADMIN — Medication 100 MILLIGRAM(S): at 09:10

## 2022-09-21 NOTE — CONSULT NOTE ADULT - ASSESSMENT
Patient is a 67M w/ colovesical fistula known to his Urologist Dr. Rosas.  Patient at moment is VSS, HDS, and afebrile.     Recs:  -Final recs pending
66yo gentleman with a PMH of essential HTN, HLD, asthma, diverticulitis with colovesical fistula and colon cancer s/p R hemicolectomy (2016) who p/w symptomatic hypoglycemia and imaging c/f worsening colo- vesicular fistula.  Medicine consulted for pre-op for sigmoid resection.     #preop for sigmoid resection   -Self-reported physical activity: no limitation walking on flat ground or stairs    Assessment:  -Mets >4  -RCRI - Class I risk, indicating 3.9 % 30-day risk of death, MI, or cardiac arrest  -Lara - 0.1 % risk of MI or cardiac arrest within 30 days of surgery  -He is intermediate risk for an intermediate risk procedure.  Can proceed w/surgery if accepting of these risks.    - Please hold Losartan as normotensive.  - Start FS given q8 pre-meals and before bedtime for hypoglycemia     #HTN   Normotensive as of now   Home med: Losartan 50 mg qd  - Hold anti hypertensive as of now    #HLD  - can continue with Simvastatin qhs        
67M h/o diverticulitis with colovesicular fistula (dx in 7/2022), colon cancer s/p R hemicolectomy in 2016 in remission, HTN, HLD, asthma p/w weakness and hypoglycemia. Admitted with plan of repair of colevesicular fistula tomorrow and ID consulted for diverticulitis as well as CoNS staph bacteremi    - vanco 1g IV q12h for staph bacteremia  -Cont CTX/flagyl to treat cystitis/diverticulitis     Will continue to follow

## 2022-09-21 NOTE — CONSULT NOTE ADULT - ATTENDING COMMENTS
Patient was seen and examined with the resident team today.  I agree with Dr. Romero's assessment and plan with the following exceptions/additions:     Briefly, this is a 68yo gentleman with a PMH of essential HTN, HLD, asthma, diverticulitis with colovescicular fistula and colon cancer s/p R hemicolectomy (2016) who p/w symptomatic hypoglycemia and imaging c/f worsening colovesicular fistula.  Medicine consulted for pre-op for sigmoing resection.  ROS negative for CP w/ and w/o exertion, SOB, ESTRADA, orthopnea, PND and palpitations.  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  No prior post-op complications from anesthesia; no prior cardiopulmonary complications as well.    -- He is intermediate risk for an intermediate risk procedure.  Can proceed w/surgery if accepting of these risks.    -- Please hold Losartan as normotensive.  -- Start FS given hypoglycemia in the field.     Remainder of plan as per above.     Chen Patterson  [phone]
Briefly 67M h/o diverticulitis with colovesicular fistula (dx in 7/2022), colon cancer s/p R hemicolectomy in 2016 in remission, HTN, HLD, asthma p/w weakness and hypoglycemia.  He started to have diarrhea in 1/2022 which progressively worsened. He started to have urine coming out of anus and feces coming out from urine in July.  He was seen by PMD, suspected to have colovesicular fistula and referred to  and Surgery.  CT a/p showed colovesicular fistula and cystitis, inflammation in colon and he was told he has diverticulitis. He was suppoed to have surgical intervention next week then he became progresisvely weakened, unable to get up from bathroom so he came to the hospital. Upon admission, VSS, afebrile, WC 18.  CT a/p showed large colovesical fistula, bladder thickened, bladder stone, and sigmoid colon inflammation. He was started on IV CTX/flagyl. BCx now growing CoNS from 3/4 bottles, and patient denied any hardware or central line in his body. ID was consulted for abx rec. C.diff neg, GI PCR neg, WBC 11.3, Cr 0.64. Per micro, BCx growing staph xylosus, which is CoNS. Unclear significance of staph sylosus - he doesn't have risk factors to have true CoNS bacteremia so possibly contaminant.  For now, start vanco 1g IV q12h and check level before 4th dose to cover staph bacteremia. Cont CTX/flagyl to treat cystitis/diverticulitis, would do short course.  F/u susceptibility of staph xylosus.     Team 1 will follow you.  Case d/w primary team.    Adeline St MD, MS  Infectious Disease attending  work cell [phone]   For any questions during evening/weekend/holiday, please page ID on call

## 2022-09-21 NOTE — CONSULT NOTE ADULT - SUBJECTIVE AND OBJECTIVE BOX
Patient is a 67y old  Male who presents with a chief complaint of     HPI:  66 yo M with PMH HTN, HLD, asthma, diverticulitis with colovescicular fistula and colon cancer s/p R hemicolectomy (2016) presenting with weakness and hypoglycemia. Patient has been feeling progressively weaker for the past week. He reports that today he was going to bathroom and couldn't get up after sitting down. EMS was called and he was noted to have low blood glucose - he was given D10 en route to the hospital. Denies nausea/vomiting, denies dysuria but has been having worsening fecaluria and more urine in his stool. Denies blood in BMs. Denies fevers/chills. Patient saw Dr. Zhang last Friday for a colonoscopy - biopsies were taken and he was planned for surgery next week.  Endoscopy - never    ED: Afebrile, VSS, WBC 18 Hb 9, glucose 142. CT scan showing the chronic large colovesical fistula, bladder is diffusely thickened and inflamed, numerous outpouchings of the urinary bladder which may be incomplete fistula tracks bladder diverticula. Large amount of non dependent intraluminal bladder gas, numerous nonobstructive bladder stones                     <<<<<<<<<<<<  NOTE >>>>>>>>>>>>>>>>     Patient is as stated above,  team consulted for known colovesical fistula, w/ follow up w/ Dr. Rosas as his Urologist.  Patient states that he has been having diarrhea since approx July but has not had any definitive treatments for it.  He states that he is voiding well and feels like he is fully emptying.  Patient endorses intermittent dysuria, stool in his urine and urine in his stool.  He denies n/v/f/c, hematuria, LUT's, flank pain, chest pain, sob.     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, work- pharmacist  Meds: simvastatin, losartan, albuterol - doesn't remember dose         (19 Sep 2022 23:03)      Vital Signs Last 24 Hrs  T(C): 36.4 (20 Sep 2022 04:56), Max: 36.4 (20 Sep 2022 00:50)  T(F): 97.6 (20 Sep 2022 04:56), Max: 97.6 (20 Sep 2022 04:56)  HR: 71 (20 Sep 2022 04:56) (68 - 95)  BP: 111/70 (20 Sep 2022 04:56) (90/50 - 117/56)  BP(mean): --  RR: 17 (20 Sep 2022 04:56) (16 - 20)  SpO2: 93% (20 Sep 2022 04:56) (93% - 98%)    Parameters below as of 20 Sep 2022 04:56  Patient On (Oxygen Delivery Method): room air      I&O's Summary    19 Sep 2022 07:01  -  20 Sep 2022 07:00  --------------------------------------------------------  IN: 100 mL / OUT: 375 mL / NET: -275 mL        PE:  Gen: NAD, alert and oriented x 3   Abd: appropriately soft nt/nd. negative CVAT B/L  : Negative SP tenderness.   ELIZA: Deferred     LABS:                        8.6    10.29 )-----------( 212      ( 20 Sep 2022 07:15 )             27.5     09-20    137  |  105  |  17  ----------------------------<  84  4.0   |  23  |  0.64    Ca    9.1      20 Sep 2022 07:15  Phos  3.1     09-20  Mg     2.7     09-20    TPro  5.0<L>  /  Alb  2.4<L>  /  TBili  0.4  /  DBili  x   /  AST  76<H>  /  ALT  23  /  AlkPhos  88  09-20    PT/INR - ( 20 Sep 2022 07:33 )   PT: 15.1 sec;   INR: 1.27          PTT - ( 20 Sep 2022 07:33 )  PTT:28.2 sec  Cultures      Imaging: CT scan impressions  IMPRESSION:  1. As depicted on prior study there is a large colovesical fistula. Urinary  bladder is diffusely thickened and inflamed compatible with cystitis. There are  numerous outpouchings of the urinary bladder which may be incomplete fistula  tracks bladder diverticula. Large amount of non dependent intraluminal bladder  gas. Numerous nonobstructing bladder stones.  2. Splenomegaly.  3. Trace bilateral pleural effusions.  4. No intestinal obstruction. Small volume ascites in the pelvis.  5. Incompletely visualized large right scrotal hydrocele.    
INFECTIOUS DISEASES INITIAL CONSULT NOTE    HPI:  66 yo M with PMH HTN, HLD, asthma, diverticulitis with colovescicular fistula and colon cancer s/p R hemicolectomy () presenting with weakness and hypoglycemia. Patient has been feeling progressively weaker for the past week. He reports that today he was going to bathroom and couldn't get up after sitting down. EMS was called and he was noted to have low blood glucose - he was given D10 en route to the hospital. Denies nausea/vomiting, denies dysuria but has been having worsening fecaluria and more urine in his stool. Denies blood in BMs. Denies fevers/chills. Patient saw Dr. Zhang last Friday for a colonoscopy - biopsies were taken and he was planned for surgery next week.  Endoscopy - never    ED: Afebrile, VSS, WBC 18 Hb 9, glucose 142. CT scan showing the chronic large colovesical fistula, bladder is diffusely thickened and inflamed, numerous outpouchings of the urinary bladder which may be incomplete fistula tracks bladder diverticula. 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, work- pharmacist  Meds: simvastatin, losartan, albuterol - doesn't remember dose         (19 Sep 2022 23:03)      PAST MEDICAL & SURGICAL HISTORY:  Malignant neoplasm of hepatic flexure      Intestinal obstruction      Hypertension      High cholesterol      History of asthma      H/O left inguinal hernia repair      S/P right hemicolectomy            Review of Systems:   Constitutional, eyes, ENT, cardiovascular, respiratory, gastrointestinal, genitourinary, integumentary, neurological, psychiatric and heme/lymph are otherwise negative other than noted above       ANTIBIOTICS:  MEDICATIONS  (STANDING):  cefTRIAXone   IVPB 1000 milliGRAM(s) IV Intermittent every 24 hours  heparin   Injectable 5000 Unit(s) SubCutaneous every 8 hours  influenza  Vaccine (HIGH DOSE) 0.7 milliLiter(s) IntraMuscular once  lactated ringers. 1000 milliLiter(s) (40 mL/Hr) IV Continuous <Continuous>  metroNIDAZOLE  IVPB 500 milliGRAM(s) IV Intermittent every 8 hours  pantoprazole    Tablet 40 milliGRAM(s) Oral before breakfast    MEDICATIONS  (PRN):  acetaminophen     Tablet .. 650 milliGRAM(s) Oral every 6 hours PRN Mild Pain (1 - 3)  ondansetron Injectable 4 milliGRAM(s) IV Push every 6 hours PRN Nausea      Allergies    No Known Allergies    Intolerances        SOCIAL HISTORY:    FAMILY HISTORY:  FH: prostate cancer (Father)    FH: bladder cancer (Father)    FH: liver cancer (Father)    FH: kidney cancer (Father)    FH: breast cancer (Mother, Sibling)    FH: multiple myeloma (Sibling)     no FH leading to current infection    Vital Signs Last 24 Hrs  T(C): 36.3 (21 Sep 2022 05:17), Max: 36.7 (20 Sep 2022 14:16)  T(F): 97.3 (21 Sep 2022 05:17), Max: 98.1 (20 Sep 2022 14:16)  HR: 63 (21 Sep 2022 05:17) (63 - 71)  BP: 127/80 (21 Sep 2022 05:17) (99/63 - 127/80)  BP(mean): 75 (20 Sep 2022 21:04) (75 - 75)  RR: 16 (21 Sep 2022 05:17) (16 - 17)  SpO2: 99% (21 Sep 2022 05:17) (94% - 99%)    Parameters below as of 21 Sep 2022 05:17  Patient On (Oxygen Delivery Method): room air        22 @ 07:01  -  22 @ 07:00  --------------------------------------------------------  IN: 1860 mL / OUT: 650 mL / NET: 1210 mL        PHYSICAL EXAM:  Constitutional: alert, NAD  Eyes: the sclera and conjunctiva were normal.   ENT: the ears and nose were normal in appearance.   Neck: the appearance of the neck was normal and the neck was supple.   Pulmonary: no respiratory distress and lungs were clear to auscultation bilaterally.   Heart: heart rate was normal and rhythm regular, normal S1 and S2  Vascular:. there was no peripheral edema  Abdomen: normal bowel sounds, soft, non-tender  Neurological: no focal deficits.   Psychiatric: the affect was normal      LABS:                        9.9    11.30 )-----------( 253      ( 21 Sep 2022 06:05 )             31.3     09-21    139  |  106  |  19  ----------------------------<  124<H>  4.0   |  23  |  0.64    Ca    9.2      21 Sep 2022 06:05  Phos  2.5     09-  Mg     1.9     -    TPro  5.0<L>  /  Alb  2.4<L>  /  TBili  0.4  /  DBili  x   /  AST  76<H>  /  ALT  23  /  AlkPhos  88  09-20    PT/INR - ( 21 Sep 2022 06:05 )   PT: 14.4 sec;   INR: 1.21          PTT - ( 21 Sep 2022 06:05 )  PTT:27.1 sec  Urinalysis Basic - ( 20 Sep 2022 02:30 )    Color: Yellow / Appearance: Clear / S.020 / pH: x  Gluc: x / Ketone: Trace mg/dL  / Bili: Negative / Urobili: 2.0 E.U./dL   Blood: x / Protein: 100 mg/dL / Nitrite: NEGATIVE   Leuk Esterase: Moderate / RBC: > 10 /HPF / WBC Many /HPF   Sq Epi: x / Non Sq Epi: 0-5 /HPF / Bacteria: Many /HPF        MICROBIOLOGY:    RADIOLOGY & ADDITIONAL STUDIES:  
INTERVAL/OVERNIGHT EVENTS: As per overnight staff, there were no acute events overnight    SUBJECTIVE HPI: Patient seen and examined at bedside. Patient resting comfortably, no complaints at this time.      MEDICATIONS  (STANDING):  cefTRIAXone   IVPB 1000 milliGRAM(s) IV Intermittent every 24 hours  heparin   Injectable 5000 Unit(s) SubCutaneous every 8 hours  influenza  Vaccine (HIGH DOSE) 0.7 milliLiter(s) IntraMuscular once  metroNIDAZOLE  IVPB 500 milliGRAM(s) IV Intermittent every 8 hours    MEDICATIONS  (PRN):  acetaminophen     Tablet .. 650 milliGRAM(s) Oral every 6 hours PRN Mild Pain (1 - 3)  ondansetron Injectable 4 milliGRAM(s) IV Push every 6 hours PRN Nausea        VITAL SIGNS:  Vital Signs Last 24 Hrs  T(C): 36.6 (20 Sep 2022 09:09), Max: 36.6 (20 Sep 2022 09:09)  T(F): 97.8 (20 Sep 2022 09:09), Max: 97.8 (20 Sep 2022 09:09)  HR: 77 (20 Sep 2022 09:09) (68 - 95)  BP: 115/81 (20 Sep 2022 09:09) (90/50 - 117/56)  BP(mean): --  RR: 17 (20 Sep 2022 09:09) (16 - 20)  SpO2: 95% (20 Sep 2022 09:09) (93% - 98%)    Parameters below as of 20 Sep 2022 09:09  Patient On (Oxygen Delivery Method): room air        I&O's Detail    19 Sep 2022 07:01  -  20 Sep 2022 07:00  --------------------------------------------------------  IN:    IV PiggyBack: 100 mL  Total IN: 100 mL    OUT:    Voided (mL): 375 mL  Total OUT: 375 mL    Total NET: -275 mL          PHYSICAL EXAM:  General: Comfortable, pleasant  Neurological: AAOx3, no focal deficits  HEENT: NC/AT; EOMI, PERRL, clear conjunctiva, no nasal or oropharyngeal discharge or exudates, MMM  Neck: supple, no cervical or post-auricular lymphadenopathy  Cardiovascular: +S1/S2, no murmurs/rubs/gallops, RRR  Respiratory: CTA B/L, no diminished breath sounds, no wheezes/rales/rhonchi, no increased work of breathing or accessory muscle use  Gastrointestinal: soft, NT/ND  Genitourinary: no suprapubic tenderness  Extremities: WWP; no edema, clubbing or cyanosis    Lines/Drains:     LABS:                        8.6    10.29 )-----------( 212      ( 20 Sep 2022 07:15 )             27.5         137  |  105  |  17  ----------------------------<  84  4.0   |  23  |  0.64    Ca    9.1      20 Sep 2022 07:15  Phos  3.1       Mg     2.7         TPro  5.0<L>  /  Alb  2.4<L>  /  TBili  0.4  /  DBili  x   /  AST  76<H>  /  ALT  23  /  AlkPhos  88      PT/INR - ( 20 Sep 2022 07:33 )   PT: 15.1 sec;   INR: 1.27          PTT - ( 20 Sep 2022 07:33 )  PTT:28.2 sec    CARDIAC MARKERS ( 19 Sep 2022 17:51 )  x     / <0.01 ng/mL / x     / x     / x          BNP    Urinalysis Basic - ( 20 Sep 2022 02:30 )    Color: Yellow / Appearance: Clear / S.020 / pH: x  Gluc: x / Ketone: Trace mg/dL  / Bili: Negative / Urobili: 2.0 E.U./dL   Blood: x / Protein: 100 mg/dL / Nitrite: NEGATIVE   Leuk Esterase: Moderate / RBC: > 10 /HPF / WBC Many /HPF   Sq Epi: x / Non Sq Epi: 0-5 /HPF / Bacteria: Many /HPF            Microbiology:    Culture - Blood (collected 22 @ 19:53)  Source: .Blood Blood  Preliminary Report (22 @ 09:00):    No growth at 12 hours    Culture - Blood (collected 22 @ 19:53)  Source: .Blood Blood  Gram Stain (22 @ 13:32):    Aerobic Bottle: Gram Positive Cocci in Clusters    Result called to and read back byAnkush Goddard RN  2022 13:32:03    ***Blood Panel PCR results on this specimen are available    approximately 3 hours after the Gram stain result.***    Gram stain, PCR, and/or culture results may not always    correspond due to difference in methodologies.    ************************************************************    This PCR assay was performed using [employer].    The following targets are tested for: Enterococcus,    vancomycin resistant enterococci, Listeria monocytogenes,    coagulase negative staphylococci, S. aureus,    methicillin resistant S. aureus, Streptococcus agalactiae    (Group B), S. pneumoniae, S. pyogenes (Group A),    Acinetobacter baumannii, Enterobacter cloacae, E. coli,    Klebsiella oxytoca, K. pneumoniae, Proteus sp.,    Serratia marcescens, Haemophilus influenzae,    Neisseria meningitidis, Pseudomonas aeruginosa, Candida    albicans, C. glabrata, C krusei, C parapsilosis,    C. tropicalis and the KPC resistance gene.  Preliminary Report (22 @ 13:37):    Culture in progress        RADIOLOGY & ADDITIONAL STUDIES: Reviewed.    ECG:    ECHO:

## 2022-09-22 LAB
-  CLINDAMYCIN: SIGNIFICANT CHANGE UP
-  ERYTHROMYCIN: SIGNIFICANT CHANGE UP
-  LINEZOLID: SIGNIFICANT CHANGE UP
-  OXACILLIN: SIGNIFICANT CHANGE UP
-  RIFAMPIN: SIGNIFICANT CHANGE UP
-  TRIMETHOPRIM/SULFAMETHOXAZOLE: SIGNIFICANT CHANGE UP
-  VANCOMYCIN: SIGNIFICANT CHANGE UP
ANION GAP SERPL CALC-SCNC: 9 MMOL/L — SIGNIFICANT CHANGE UP (ref 5–17)
BUN SERPL-MCNC: 13 MG/DL — SIGNIFICANT CHANGE UP (ref 7–23)
CALCIUM SERPL-MCNC: 9.2 MG/DL — SIGNIFICANT CHANGE UP (ref 8.4–10.5)
CHLORIDE SERPL-SCNC: 104 MMOL/L — SIGNIFICANT CHANGE UP (ref 96–108)
CO2 SERPL-SCNC: 25 MMOL/L — SIGNIFICANT CHANGE UP (ref 22–31)
CREAT SERPL-MCNC: 0.65 MG/DL — SIGNIFICANT CHANGE UP (ref 0.5–1.3)
EGFR: 103 ML/MIN/1.73M2 — SIGNIFICANT CHANGE UP
GLUCOSE SERPL-MCNC: 114 MG/DL — HIGH (ref 70–99)
HCT VFR BLD CALC: 30.6 % — LOW (ref 39–50)
HGB BLD-MCNC: 9.6 G/DL — LOW (ref 13–17)
MAGNESIUM SERPL-MCNC: 1.8 MG/DL — SIGNIFICANT CHANGE UP (ref 1.6–2.6)
MCHC RBC-ENTMCNC: 24.3 PG — LOW (ref 27–34)
MCHC RBC-ENTMCNC: 31.4 GM/DL — LOW (ref 32–36)
MCV RBC AUTO: 77.5 FL — LOW (ref 80–100)
METHOD TYPE: SIGNIFICANT CHANGE UP
NRBC # BLD: 0 /100 WBCS — SIGNIFICANT CHANGE UP (ref 0–0)
PHOSPHATE SERPL-MCNC: 2.8 MG/DL — SIGNIFICANT CHANGE UP (ref 2.5–4.5)
PLATELET # BLD AUTO: 291 K/UL — SIGNIFICANT CHANGE UP (ref 150–400)
POTASSIUM SERPL-MCNC: 3.8 MMOL/L — SIGNIFICANT CHANGE UP (ref 3.5–5.3)
POTASSIUM SERPL-SCNC: 3.8 MMOL/L — SIGNIFICANT CHANGE UP (ref 3.5–5.3)
RBC # BLD: 3.95 M/UL — LOW (ref 4.2–5.8)
RBC # FLD: 15.9 % — HIGH (ref 10.3–14.5)
SODIUM SERPL-SCNC: 138 MMOL/L — SIGNIFICANT CHANGE UP (ref 135–145)
WBC # BLD: 8.35 K/UL — SIGNIFICANT CHANGE UP (ref 3.8–10.5)
WBC # FLD AUTO: 8.35 K/UL — SIGNIFICANT CHANGE UP (ref 3.8–10.5)

## 2022-09-22 PROCEDURE — 99233 SBSQ HOSP IP/OBS HIGH 50: CPT

## 2022-09-22 RX ORDER — MAGNESIUM SULFATE 500 MG/ML
2 VIAL (ML) INJECTION ONCE
Refills: 0 | Status: COMPLETED | OUTPATIENT
Start: 2022-09-22 | End: 2022-09-22

## 2022-09-22 RX ORDER — POTASSIUM CHLORIDE 20 MEQ
20 PACKET (EA) ORAL ONCE
Refills: 0 | Status: COMPLETED | OUTPATIENT
Start: 2022-09-22 | End: 2022-09-22

## 2022-09-22 RX ORDER — POTASSIUM PHOSPHATE, MONOBASIC POTASSIUM PHOSPHATE, DIBASIC 236; 224 MG/ML; MG/ML
15 INJECTION, SOLUTION INTRAVENOUS ONCE
Refills: 0 | Status: COMPLETED | OUTPATIENT
Start: 2022-09-22 | End: 2022-09-22

## 2022-09-22 RX ADMIN — Medication 100 MILLIGRAM(S): at 18:01

## 2022-09-22 RX ADMIN — Medication 20 MILLIEQUIVALENT(S): at 09:47

## 2022-09-22 RX ADMIN — HEPARIN SODIUM 5000 UNIT(S): 5000 INJECTION INTRAVENOUS; SUBCUTANEOUS at 09:47

## 2022-09-22 RX ADMIN — Medication 100 MILLIGRAM(S): at 00:37

## 2022-09-22 RX ADMIN — Medication 100 MILLIGRAM(S): at 09:47

## 2022-09-22 RX ADMIN — PANTOPRAZOLE SODIUM 40 MILLIGRAM(S): 20 TABLET, DELAYED RELEASE ORAL at 06:16

## 2022-09-22 RX ADMIN — Medication 25 GRAM(S): at 09:47

## 2022-09-22 RX ADMIN — SODIUM CHLORIDE 40 MILLILITER(S): 9 INJECTION, SOLUTION INTRAVENOUS at 00:37

## 2022-09-22 RX ADMIN — HEPARIN SODIUM 5000 UNIT(S): 5000 INJECTION INTRAVENOUS; SUBCUTANEOUS at 00:36

## 2022-09-22 RX ADMIN — POTASSIUM PHOSPHATE, MONOBASIC POTASSIUM PHOSPHATE, DIBASIC 62.5 MILLIMOLE(S): 236; 224 INJECTION, SOLUTION INTRAVENOUS at 15:05

## 2022-09-22 RX ADMIN — HEPARIN SODIUM 5000 UNIT(S): 5000 INJECTION INTRAVENOUS; SUBCUTANEOUS at 18:01

## 2022-09-22 RX ADMIN — CEFTRIAXONE 100 MILLIGRAM(S): 500 INJECTION, POWDER, FOR SOLUTION INTRAMUSCULAR; INTRAVENOUS at 19:17

## 2022-09-22 NOTE — DIETITIAN INITIAL EVALUATION ADULT - OTHER INFO
66 yo M with PMH HTN, HLD, asthma, diverticulitis with colovescicular fistula and colon cancer s/p R hemicolectomy (2016) presenting with weakness and hypoglycemia. In the ED afebrile, VSS, WBC 18 Hb 9, glucose 142. CT with colovesicular fistula, thickened and chronically inflamed sigmoid, bladder distended and inflamed, consistent with cystitis, ascites in the pelvis. Patient will likely need surgery sooner than planned given chronic UTI/concern for impending urosepsis. Admitted for IV abx and plan for surgery.    Pt was seen resting in bed. Currently on a regular diet, able to consume >75% of tray. He denies any n/v. States has been having chronic diarrhea PTA til now, last BM 9/22. Reports  lbs, noted that he started losing wt 6 months ago,  lbs, indicates -20 lb/10% in 6 months (significant) despite normal PO intake, and no changes in appetite. Pt was told biopsy of fistula was malignant and so suspect pt likely not meeting high energy needs with usual intake. NFPE performed, noted mild muscle loss in temples. Meets criteria for moderate malnutrition. Discussed with pt to continue to adequate PO intake, with focus on lean protein intake at each meal. Pt amenable to trial ONS to help maintain weight and preserve lean muscle mass. No cultural, ethnic, Evangelical food preferences noted, NKFA. Paged team for nutrition recs, at this time, possible plan for PICC to initiate TPN for nutrition optimization. No pain, no n/v. Skin: paolo 19, no PU or edema. RD to follow per protocol. Please see below for nutr recs.

## 2022-09-22 NOTE — DIETITIAN INITIAL EVALUATION ADULT - PERTINENT MEDS FT
MEDICATIONS  (STANDING):  cefTRIAXone   IVPB 1000 milliGRAM(s) IV Intermittent every 24 hours  heparin   Injectable 5000 Unit(s) SubCutaneous every 8 hours  influenza  Vaccine (HIGH DOSE) 0.7 milliLiter(s) IntraMuscular once  lactated ringers. 1000 milliLiter(s) (40 mL/Hr) IV Continuous <Continuous>  metroNIDAZOLE  IVPB 500 milliGRAM(s) IV Intermittent every 8 hours  pantoprazole    Tablet 40 milliGRAM(s) Oral before breakfast  potassium phosphate IVPB 15 milliMole(s) IV Intermittent once    MEDICATIONS  (PRN):  acetaminophen     Tablet .. 650 milliGRAM(s) Oral every 6 hours PRN Mild Pain (1 - 3)  ondansetron Injectable 4 milliGRAM(s) IV Push every 6 hours PRN Nausea

## 2022-09-22 NOTE — DIETITIAN INITIAL EVALUATION ADULT - ADD RECOMMEND
1. Continue with regular diet. rec include Ensure Max Protein BID (300 kcal, 90 g protein, 900 ml free H2O)  2. If able to place PICC, TPN via central line: 315 g Dex, 107 g AA, 50g 20% Lipids to provide in total 2000 Kcal, 107 g protein, GIR 2.83, 1.4 g/kg IBW protein  Recommend checking TG before starting TPN and then check TG weekly. Check Mg, Phos, K daily and POC BG Q6hrs. Trend daily weights. Fluids and lytes per MD discretion. Start at 150g Dex on Day 1, 250g Dex on Day 2, and advance to goal of 315 g Dex on Day 3.  3. monitor BM and pain, regimen per team  4. Diet edu prn

## 2022-09-22 NOTE — DIETITIAN INITIAL EVALUATION ADULT - OTHER CALCULATIONS
lbs. %%. ABW used to calculate energy needs due to pt's current body weight within % IBW. Needs adjusted for age and wt, malignancy, malnutrition

## 2022-09-22 NOTE — DIETITIAN NUTRITION RISK NOTIFICATION - TREATMENT: THE FOLLOWING DIET HAS BEEN RECOMMENDED
Diet, Regular:   Supplement Feeding Modality:  Oral  Ensure Max Cans or Servings Per Day:  1       Frequency:  Two Times a day (09-22-22 @ 12:30) [Active]    1. Continue with regular diet. rec include Ensure Max Protein BID (300 kcal, 90 g protein, 900 ml free H2O)  2. If able to place PICC, TPN via central line: 315 g Dex, 107 g AA, 50g 20% Lipids to provide in total 2000 Kcal, 107 g protein, GIR 2.83, 1.4 g/kg IBW protein  Recommend checking TG before starting TPN and then check TG weekly. Check Mg, Phos, K daily and POC BG Q6hrs. Trend daily weights. Fluids and lytes per MD discretion. Start at 150g Dex on Day 1, 250g Dex on Day 2, and advance to goal of 315 g Dex on Day 3.  3. monitor BM and pain, regimen per team  4. Diet edu prn

## 2022-09-22 NOTE — DIETITIAN INITIAL EVALUATION ADULT - PERSON TAUGHT/METHOD
Continue to encourage PO intake with emphasis on lean protein to preserve lean muscles, and maintain wt/verbal instruction/patient instructed

## 2022-09-22 NOTE — DIETITIAN INITIAL EVALUATION ADULT - PERTINENT LABORATORY DATA
09-22    138  |  104  |  13  ----------------------------<  114<H>  3.8   |  25  |  0.65    Ca    9.2      22 Sep 2022 07:02  Phos  2.8     09-22  Mg     1.8     09-22

## 2022-09-23 PROBLEM — Z87.09 PERSONAL HISTORY OF OTHER DISEASES OF THE RESPIRATORY SYSTEM: Chronic | Status: ACTIVE | Noted: 2022-09-20

## 2022-09-23 LAB
-  CLINDAMYCIN: SIGNIFICANT CHANGE UP
-  CLINDAMYCIN: SIGNIFICANT CHANGE UP
-  ERYTHROMYCIN: SIGNIFICANT CHANGE UP
-  ERYTHROMYCIN: SIGNIFICANT CHANGE UP
-  LINEZOLID: SIGNIFICANT CHANGE UP
-  LINEZOLID: SIGNIFICANT CHANGE UP
-  OXACILLIN: SIGNIFICANT CHANGE UP
-  RIFAMPIN: SIGNIFICANT CHANGE UP
-  RIFAMPIN: SIGNIFICANT CHANGE UP
-  TRIMETHOPRIM/SULFAMETHOXAZOLE: SIGNIFICANT CHANGE UP
-  TRIMETHOPRIM/SULFAMETHOXAZOLE: SIGNIFICANT CHANGE UP
-  VANCOMYCIN: SIGNIFICANT CHANGE UP
-  VANCOMYCIN: SIGNIFICANT CHANGE UP
ANION GAP SERPL CALC-SCNC: 11 MMOL/L — SIGNIFICANT CHANGE UP (ref 5–17)
BUN SERPL-MCNC: 10 MG/DL — SIGNIFICANT CHANGE UP (ref 7–23)
CALCIUM SERPL-MCNC: 9.1 MG/DL — SIGNIFICANT CHANGE UP (ref 8.4–10.5)
CHLORIDE SERPL-SCNC: 102 MMOL/L — SIGNIFICANT CHANGE UP (ref 96–108)
CO2 SERPL-SCNC: 25 MMOL/L — SIGNIFICANT CHANGE UP (ref 22–31)
CREAT SERPL-MCNC: 0.74 MG/DL — SIGNIFICANT CHANGE UP (ref 0.5–1.3)
EGFR: 99 ML/MIN/1.73M2 — SIGNIFICANT CHANGE UP
GLUCOSE SERPL-MCNC: 104 MG/DL — HIGH (ref 70–99)
HCT VFR BLD CALC: 30.8 % — LOW (ref 39–50)
HGB BLD-MCNC: 9.7 G/DL — LOW (ref 13–17)
MAGNESIUM SERPL-MCNC: 2 MG/DL — SIGNIFICANT CHANGE UP (ref 1.6–2.6)
MCHC RBC-ENTMCNC: 24.2 PG — LOW (ref 27–34)
MCHC RBC-ENTMCNC: 31.5 GM/DL — LOW (ref 32–36)
MCV RBC AUTO: 76.8 FL — LOW (ref 80–100)
METHOD TYPE: SIGNIFICANT CHANGE UP
METHOD TYPE: SIGNIFICANT CHANGE UP
NRBC # BLD: 0 /100 WBCS — SIGNIFICANT CHANGE UP (ref 0–0)
PHOSPHATE SERPL-MCNC: 3.1 MG/DL — SIGNIFICANT CHANGE UP (ref 2.5–4.5)
PLATELET # BLD AUTO: 302 K/UL — SIGNIFICANT CHANGE UP (ref 150–400)
POTASSIUM SERPL-MCNC: 4.3 MMOL/L — SIGNIFICANT CHANGE UP (ref 3.5–5.3)
POTASSIUM SERPL-SCNC: 4.3 MMOL/L — SIGNIFICANT CHANGE UP (ref 3.5–5.3)
RBC # BLD: 4.01 M/UL — LOW (ref 4.2–5.8)
RBC # FLD: 15.9 % — HIGH (ref 10.3–14.5)
SODIUM SERPL-SCNC: 138 MMOL/L — SIGNIFICANT CHANGE UP (ref 135–145)
WBC # BLD: 8.54 K/UL — SIGNIFICANT CHANGE UP (ref 3.8–10.5)
WBC # FLD AUTO: 8.54 K/UL — SIGNIFICANT CHANGE UP (ref 3.8–10.5)

## 2022-09-23 PROCEDURE — 99232 SBSQ HOSP IP/OBS MODERATE 35: CPT | Mod: GC

## 2022-09-23 PROCEDURE — 36573 INSJ PICC RS&I 5 YR+: CPT

## 2022-09-23 RX ADMIN — PANTOPRAZOLE SODIUM 40 MILLIGRAM(S): 20 TABLET, DELAYED RELEASE ORAL at 06:17

## 2022-09-23 RX ADMIN — HEPARIN SODIUM 5000 UNIT(S): 5000 INJECTION INTRAVENOUS; SUBCUTANEOUS at 17:17

## 2022-09-23 RX ADMIN — CEFTRIAXONE 100 MILLIGRAM(S): 500 INJECTION, POWDER, FOR SOLUTION INTRAMUSCULAR; INTRAVENOUS at 18:47

## 2022-09-23 RX ADMIN — SODIUM CHLORIDE 40 MILLILITER(S): 9 INJECTION, SOLUTION INTRAVENOUS at 01:51

## 2022-09-23 RX ADMIN — HEPARIN SODIUM 5000 UNIT(S): 5000 INJECTION INTRAVENOUS; SUBCUTANEOUS at 01:51

## 2022-09-23 RX ADMIN — HEPARIN SODIUM 5000 UNIT(S): 5000 INJECTION INTRAVENOUS; SUBCUTANEOUS at 08:49

## 2022-09-23 RX ADMIN — Medication 100 MILLIGRAM(S): at 08:49

## 2022-09-23 RX ADMIN — Medication 100 MILLIGRAM(S): at 17:17

## 2022-09-23 RX ADMIN — Medication 100 MILLIGRAM(S): at 01:51

## 2022-09-24 LAB
ANION GAP SERPL CALC-SCNC: 10 MMOL/L — SIGNIFICANT CHANGE UP (ref 5–17)
BUN SERPL-MCNC: 7 MG/DL — SIGNIFICANT CHANGE UP (ref 7–23)
CALCIUM SERPL-MCNC: 9.2 MG/DL — SIGNIFICANT CHANGE UP (ref 8.4–10.5)
CHLORIDE SERPL-SCNC: 102 MMOL/L — SIGNIFICANT CHANGE UP (ref 96–108)
CO2 SERPL-SCNC: 25 MMOL/L — SIGNIFICANT CHANGE UP (ref 22–31)
CREAT SERPL-MCNC: 0.66 MG/DL — SIGNIFICANT CHANGE UP (ref 0.5–1.3)
EGFR: 102 ML/MIN/1.73M2 — SIGNIFICANT CHANGE UP
GLUCOSE SERPL-MCNC: 93 MG/DL — SIGNIFICANT CHANGE UP (ref 70–99)
HCT VFR BLD CALC: 31.8 % — LOW (ref 39–50)
HGB BLD-MCNC: 9.9 G/DL — LOW (ref 13–17)
MAGNESIUM SERPL-MCNC: 2 MG/DL — SIGNIFICANT CHANGE UP (ref 1.6–2.6)
MCHC RBC-ENTMCNC: 24.1 PG — LOW (ref 27–34)
MCHC RBC-ENTMCNC: 31.1 GM/DL — LOW (ref 32–36)
MCV RBC AUTO: 77.6 FL — LOW (ref 80–100)
NRBC # BLD: 0 /100 WBCS — SIGNIFICANT CHANGE UP (ref 0–0)
PHOSPHATE SERPL-MCNC: 3.5 MG/DL — SIGNIFICANT CHANGE UP (ref 2.5–4.5)
PLATELET # BLD AUTO: 296 K/UL — SIGNIFICANT CHANGE UP (ref 150–400)
POTASSIUM SERPL-MCNC: 4.2 MMOL/L — SIGNIFICANT CHANGE UP (ref 3.5–5.3)
POTASSIUM SERPL-SCNC: 4.2 MMOL/L — SIGNIFICANT CHANGE UP (ref 3.5–5.3)
RBC # BLD: 4.1 M/UL — LOW (ref 4.2–5.8)
RBC # FLD: 16.3 % — HIGH (ref 10.3–14.5)
SODIUM SERPL-SCNC: 137 MMOL/L — SIGNIFICANT CHANGE UP (ref 135–145)
WBC # BLD: 8.29 K/UL — SIGNIFICANT CHANGE UP (ref 3.8–10.5)
WBC # FLD AUTO: 8.29 K/UL — SIGNIFICANT CHANGE UP (ref 3.8–10.5)

## 2022-09-24 PROCEDURE — 99231 SBSQ HOSP IP/OBS SF/LOW 25: CPT

## 2022-09-24 RX ADMIN — HEPARIN SODIUM 5000 UNIT(S): 5000 INJECTION INTRAVENOUS; SUBCUTANEOUS at 01:41

## 2022-09-24 RX ADMIN — HEPARIN SODIUM 5000 UNIT(S): 5000 INJECTION INTRAVENOUS; SUBCUTANEOUS at 08:55

## 2022-09-24 RX ADMIN — PANTOPRAZOLE SODIUM 40 MILLIGRAM(S): 20 TABLET, DELAYED RELEASE ORAL at 07:41

## 2022-09-24 RX ADMIN — CEFTRIAXONE 100 MILLIGRAM(S): 500 INJECTION, POWDER, FOR SOLUTION INTRAMUSCULAR; INTRAVENOUS at 19:41

## 2022-09-24 RX ADMIN — Medication 100 MILLIGRAM(S): at 01:41

## 2022-09-24 RX ADMIN — Medication 100 MILLIGRAM(S): at 08:55

## 2022-09-24 RX ADMIN — HEPARIN SODIUM 5000 UNIT(S): 5000 INJECTION INTRAVENOUS; SUBCUTANEOUS at 18:23

## 2022-09-24 RX ADMIN — Medication 100 MILLIGRAM(S): at 18:23

## 2022-09-25 LAB
ANION GAP SERPL CALC-SCNC: 10 MMOL/L — SIGNIFICANT CHANGE UP (ref 5–17)
BUN SERPL-MCNC: 7 MG/DL — SIGNIFICANT CHANGE UP (ref 7–23)
CALCIUM SERPL-MCNC: 9.4 MG/DL — SIGNIFICANT CHANGE UP (ref 8.4–10.5)
CHLORIDE SERPL-SCNC: 100 MMOL/L — SIGNIFICANT CHANGE UP (ref 96–108)
CO2 SERPL-SCNC: 26 MMOL/L — SIGNIFICANT CHANGE UP (ref 22–31)
CREAT SERPL-MCNC: 0.7 MG/DL — SIGNIFICANT CHANGE UP (ref 0.5–1.3)
EGFR: 100 ML/MIN/1.73M2 — SIGNIFICANT CHANGE UP
GLUCOSE SERPL-MCNC: 100 MG/DL — HIGH (ref 70–99)
HCT VFR BLD CALC: 30.1 % — LOW (ref 39–50)
HGB BLD-MCNC: 9.4 G/DL — LOW (ref 13–17)
MAGNESIUM SERPL-MCNC: 1.9 MG/DL — SIGNIFICANT CHANGE UP (ref 1.6–2.6)
MCHC RBC-ENTMCNC: 24 PG — LOW (ref 27–34)
MCHC RBC-ENTMCNC: 31.2 GM/DL — LOW (ref 32–36)
MCV RBC AUTO: 77 FL — LOW (ref 80–100)
NRBC # BLD: 0 /100 WBCS — SIGNIFICANT CHANGE UP (ref 0–0)
PHOSPHATE SERPL-MCNC: 3.4 MG/DL — SIGNIFICANT CHANGE UP (ref 2.5–4.5)
PLATELET # BLD AUTO: 323 K/UL — SIGNIFICANT CHANGE UP (ref 150–400)
POTASSIUM SERPL-MCNC: 4.1 MMOL/L — SIGNIFICANT CHANGE UP (ref 3.5–5.3)
POTASSIUM SERPL-SCNC: 4.1 MMOL/L — SIGNIFICANT CHANGE UP (ref 3.5–5.3)
RBC # BLD: 3.91 M/UL — LOW (ref 4.2–5.8)
RBC # FLD: 16.3 % — HIGH (ref 10.3–14.5)
SODIUM SERPL-SCNC: 136 MMOL/L — SIGNIFICANT CHANGE UP (ref 135–145)
WBC # BLD: 7.29 K/UL — SIGNIFICANT CHANGE UP (ref 3.8–10.5)
WBC # FLD AUTO: 7.29 K/UL — SIGNIFICANT CHANGE UP (ref 3.8–10.5)

## 2022-09-25 PROCEDURE — 99231 SBSQ HOSP IP/OBS SF/LOW 25: CPT

## 2022-09-25 RX ORDER — MAGNESIUM SULFATE 500 MG/ML
1 VIAL (ML) INJECTION ONCE
Refills: 0 | Status: COMPLETED | OUTPATIENT
Start: 2022-09-25 | End: 2022-09-25

## 2022-09-25 RX ORDER — CHLORHEXIDINE GLUCONATE 213 G/1000ML
1 SOLUTION TOPICAL DAILY
Refills: 0 | Status: DISCONTINUED | OUTPATIENT
Start: 2022-09-25 | End: 2022-09-26

## 2022-09-25 RX ADMIN — CEFTRIAXONE 100 MILLIGRAM(S): 500 INJECTION, POWDER, FOR SOLUTION INTRAMUSCULAR; INTRAVENOUS at 18:22

## 2022-09-25 RX ADMIN — PANTOPRAZOLE SODIUM 40 MILLIGRAM(S): 20 TABLET, DELAYED RELEASE ORAL at 07:12

## 2022-09-25 RX ADMIN — Medication 100 MILLIGRAM(S): at 17:03

## 2022-09-25 RX ADMIN — Medication 100 GRAM(S): at 11:33

## 2022-09-25 RX ADMIN — Medication 100 MILLIGRAM(S): at 09:01

## 2022-09-25 RX ADMIN — HEPARIN SODIUM 5000 UNIT(S): 5000 INJECTION INTRAVENOUS; SUBCUTANEOUS at 09:02

## 2022-09-25 RX ADMIN — Medication 100 MILLIGRAM(S): at 02:02

## 2022-09-25 RX ADMIN — CHLORHEXIDINE GLUCONATE 1 APPLICATION(S): 213 SOLUTION TOPICAL at 11:01

## 2022-09-25 RX ADMIN — HEPARIN SODIUM 5000 UNIT(S): 5000 INJECTION INTRAVENOUS; SUBCUTANEOUS at 02:02

## 2022-09-25 RX ADMIN — HEPARIN SODIUM 5000 UNIT(S): 5000 INJECTION INTRAVENOUS; SUBCUTANEOUS at 17:03

## 2022-09-26 ENCOUNTER — TRANSCRIPTION ENCOUNTER (OUTPATIENT)
Age: 67
End: 2022-09-26

## 2022-09-26 VITALS
HEART RATE: 65 BPM | DIASTOLIC BLOOD PRESSURE: 79 MMHG | TEMPERATURE: 98 F | SYSTOLIC BLOOD PRESSURE: 133 MMHG | RESPIRATION RATE: 16 BRPM | OXYGEN SATURATION: 98 %

## 2022-09-26 LAB
-  CEFOXITIN: SIGNIFICANT CHANGE UP
ANION GAP SERPL CALC-SCNC: 10 MMOL/L — SIGNIFICANT CHANGE UP (ref 5–17)
BUN SERPL-MCNC: 7 MG/DL — SIGNIFICANT CHANGE UP (ref 7–23)
CALCIUM SERPL-MCNC: 9.6 MG/DL — SIGNIFICANT CHANGE UP (ref 8.4–10.5)
CHLORIDE SERPL-SCNC: 98 MMOL/L — SIGNIFICANT CHANGE UP (ref 96–108)
CO2 SERPL-SCNC: 29 MMOL/L — SIGNIFICANT CHANGE UP (ref 22–31)
CREAT SERPL-MCNC: 0.72 MG/DL — SIGNIFICANT CHANGE UP (ref 0.5–1.3)
CULTURE RESULTS: SIGNIFICANT CHANGE UP
EGFR: 100 ML/MIN/1.73M2 — SIGNIFICANT CHANGE UP
GLUCOSE SERPL-MCNC: 86 MG/DL — SIGNIFICANT CHANGE UP (ref 70–99)
HCT VFR BLD CALC: 37.4 % — LOW (ref 39–50)
HGB BLD-MCNC: 11.5 G/DL — LOW (ref 13–17)
MAGNESIUM SERPL-MCNC: 2.3 MG/DL — SIGNIFICANT CHANGE UP (ref 1.6–2.6)
MCHC RBC-ENTMCNC: 24.3 PG — LOW (ref 27–34)
MCHC RBC-ENTMCNC: 30.7 GM/DL — LOW (ref 32–36)
MCV RBC AUTO: 78.9 FL — LOW (ref 80–100)
METHOD TYPE: SIGNIFICANT CHANGE UP
NRBC # BLD: 0 /100 WBCS — SIGNIFICANT CHANGE UP (ref 0–0)
ORGANISM # SPEC MICROSCOPIC CNT: SIGNIFICANT CHANGE UP
PHOSPHATE SERPL-MCNC: 3.6 MG/DL — SIGNIFICANT CHANGE UP (ref 2.5–4.5)
PLATELET # BLD AUTO: 371 K/UL — SIGNIFICANT CHANGE UP (ref 150–400)
POTASSIUM SERPL-MCNC: 4.2 MMOL/L — SIGNIFICANT CHANGE UP (ref 3.5–5.3)
POTASSIUM SERPL-SCNC: 4.2 MMOL/L — SIGNIFICANT CHANGE UP (ref 3.5–5.3)
RBC # BLD: 4.74 M/UL — SIGNIFICANT CHANGE UP (ref 4.2–5.8)
RBC # FLD: 17.8 % — HIGH (ref 10.3–14.5)
SARS-COV-2 RNA SPEC QL NAA+PROBE: DETECTED
SODIUM SERPL-SCNC: 137 MMOL/L — SIGNIFICANT CHANGE UP (ref 135–145)
SPECIMEN SOURCE: SIGNIFICANT CHANGE UP
WBC # BLD: 7.37 K/UL — SIGNIFICANT CHANGE UP (ref 3.8–10.5)
WBC # FLD AUTO: 7.37 K/UL — SIGNIFICANT CHANGE UP (ref 3.8–10.5)

## 2022-09-26 PROCEDURE — 84134 ASSAY OF PREALBUMIN: CPT

## 2022-09-26 PROCEDURE — 74177 CT ABD & PELVIS W/CONTRAST: CPT | Mod: MA

## 2022-09-26 PROCEDURE — 81001 URINALYSIS AUTO W/SCOPE: CPT

## 2022-09-26 PROCEDURE — 83690 ASSAY OF LIPASE: CPT

## 2022-09-26 PROCEDURE — 93005 ELECTROCARDIOGRAM TRACING: CPT

## 2022-09-26 PROCEDURE — 87635 SARS-COV-2 COVID-19 AMP PRB: CPT

## 2022-09-26 PROCEDURE — 87449 NOS EACH ORGANISM AG IA: CPT

## 2022-09-26 PROCEDURE — 80048 BASIC METABOLIC PNL TOTAL CA: CPT

## 2022-09-26 PROCEDURE — 83605 ASSAY OF LACTIC ACID: CPT

## 2022-09-26 PROCEDURE — 86900 BLOOD TYPING SEROLOGIC ABO: CPT

## 2022-09-26 PROCEDURE — 36573 INSJ PICC RS&I 5 YR+: CPT

## 2022-09-26 PROCEDURE — 36415 COLL VENOUS BLD VENIPUNCTURE: CPT

## 2022-09-26 PROCEDURE — 84443 ASSAY THYROID STIM HORMONE: CPT

## 2022-09-26 PROCEDURE — 87324 CLOSTRIDIUM AG IA: CPT

## 2022-09-26 PROCEDURE — 87507 IADNA-DNA/RNA PROBE TQ 12-25: CPT

## 2022-09-26 PROCEDURE — 96365 THER/PROPH/DIAG IV INF INIT: CPT

## 2022-09-26 PROCEDURE — 84100 ASSAY OF PHOSPHORUS: CPT

## 2022-09-26 PROCEDURE — C1751: CPT

## 2022-09-26 PROCEDURE — U0005: CPT

## 2022-09-26 PROCEDURE — 87086 URINE CULTURE/COLONY COUNT: CPT

## 2022-09-26 PROCEDURE — 85730 THROMBOPLASTIN TIME PARTIAL: CPT

## 2022-09-26 PROCEDURE — 82803 BLOOD GASES ANY COMBINATION: CPT

## 2022-09-26 PROCEDURE — 87186 SC STD MICRODIL/AGAR DIL: CPT

## 2022-09-26 PROCEDURE — 94640 AIRWAY INHALATION TREATMENT: CPT

## 2022-09-26 PROCEDURE — 85610 PROTHROMBIN TIME: CPT

## 2022-09-26 PROCEDURE — 87040 BLOOD CULTURE FOR BACTERIA: CPT

## 2022-09-26 PROCEDURE — 82962 GLUCOSE BLOOD TEST: CPT

## 2022-09-26 PROCEDURE — 84484 ASSAY OF TROPONIN QUANT: CPT

## 2022-09-26 PROCEDURE — 71045 X-RAY EXAM CHEST 1 VIEW: CPT

## 2022-09-26 PROCEDURE — 85025 COMPLETE CBC W/AUTO DIFF WBC: CPT

## 2022-09-26 PROCEDURE — 85027 COMPLETE CBC AUTOMATED: CPT

## 2022-09-26 PROCEDURE — 99291 CRITICAL CARE FIRST HOUR: CPT | Mod: 25

## 2022-09-26 PROCEDURE — 86850 RBC ANTIBODY SCREEN: CPT

## 2022-09-26 PROCEDURE — 87184 SC STD DISK METHOD PER PLATE: CPT

## 2022-09-26 PROCEDURE — 80053 COMPREHEN METABOLIC PANEL: CPT

## 2022-09-26 PROCEDURE — 96367 TX/PROPH/DG ADDL SEQ IV INF: CPT

## 2022-09-26 PROCEDURE — 82378 CARCINOEMBRYONIC ANTIGEN: CPT

## 2022-09-26 PROCEDURE — 83735 ASSAY OF MAGNESIUM: CPT

## 2022-09-26 PROCEDURE — 86901 BLOOD TYPING SEROLOGIC RH(D): CPT

## 2022-09-26 PROCEDURE — 96375 TX/PRO/DX INJ NEW DRUG ADDON: CPT

## 2022-09-26 PROCEDURE — 87150 DNA/RNA AMPLIFIED PROBE: CPT

## 2022-09-26 PROCEDURE — U0003: CPT

## 2022-09-26 RX ORDER — CEFTRIAXONE 500 MG/1
1 INJECTION, POWDER, FOR SOLUTION INTRAMUSCULAR; INTRAVENOUS
Qty: 7 | Refills: 0
Start: 2022-09-26 | End: 2022-10-02

## 2022-09-26 RX ORDER — SODIUM CHLORIDE 9 MG/ML
10 INJECTION INTRAMUSCULAR; INTRAVENOUS; SUBCUTANEOUS
Qty: 140 | Refills: 0
Start: 2022-09-26 | End: 2022-10-02

## 2022-09-26 RX ORDER — METRONIDAZOLE 500 MG
1 TABLET ORAL
Qty: 21 | Refills: 0
Start: 2022-09-26 | End: 2022-10-02

## 2022-09-26 RX ADMIN — HEPARIN SODIUM 5000 UNIT(S): 5000 INJECTION INTRAVENOUS; SUBCUTANEOUS at 10:48

## 2022-09-26 RX ADMIN — PANTOPRAZOLE SODIUM 40 MILLIGRAM(S): 20 TABLET, DELAYED RELEASE ORAL at 06:14

## 2022-09-26 RX ADMIN — CHLORHEXIDINE GLUCONATE 1 APPLICATION(S): 213 SOLUTION TOPICAL at 13:11

## 2022-09-26 RX ADMIN — Medication 100 MILLIGRAM(S): at 10:49

## 2022-09-26 RX ADMIN — CEFTRIAXONE 100 MILLIGRAM(S): 500 INJECTION, POWDER, FOR SOLUTION INTRAMUSCULAR; INTRAVENOUS at 19:07

## 2022-09-26 RX ADMIN — Medication 100 MILLIGRAM(S): at 01:28

## 2022-09-26 RX ADMIN — HEPARIN SODIUM 5000 UNIT(S): 5000 INJECTION INTRAVENOUS; SUBCUTANEOUS at 01:28

## 2022-09-26 RX ADMIN — Medication 100 MILLIGRAM(S): at 19:07

## 2022-09-26 RX ADMIN — HEPARIN SODIUM 5000 UNIT(S): 5000 INJECTION INTRAVENOUS; SUBCUTANEOUS at 19:07

## 2022-09-26 NOTE — PROGRESS NOTE ADULT - SUBJECTIVE AND OBJECTIVE BOX
INFECTIOUS DISEASES CONSULT FOLLOW-UP NOTE    INTERVAL HPI/OVERNIGHT EVENTS: naeo. Patient reports he was told biopsy revealed malignant fistula and awaiting next steps      ROS:   Constitutional, eyes, ENT, cardiovascular, respiratory, gastrointestinal, genitourinary, integumentary, neurological, psychiatric and heme/lymph are otherwise negative other than noted above       ANTIBIOTICS/RELEVANT:    MEDICATIONS  (STANDING):  cefTRIAXone   IVPB 1000 milliGRAM(s) IV Intermittent every 24 hours  heparin   Injectable 5000 Unit(s) SubCutaneous every 8 hours  influenza  Vaccine (HIGH DOSE) 0.7 milliLiter(s) IntraMuscular once  lactated ringers. 1000 milliLiter(s) (40 mL/Hr) IV Continuous <Continuous>  metroNIDAZOLE  IVPB 500 milliGRAM(s) IV Intermittent every 8 hours  pantoprazole    Tablet 40 milliGRAM(s) Oral before breakfast  potassium phosphate IVPB 15 milliMole(s) IV Intermittent once    MEDICATIONS  (PRN):  acetaminophen     Tablet .. 650 milliGRAM(s) Oral every 6 hours PRN Mild Pain (1 - 3)  ondansetron Injectable 4 milliGRAM(s) IV Push every 6 hours PRN Nausea        Vital Signs Last 24 Hrs  T(C): 37 (22 Sep 2022 09:04), Max: 37 (22 Sep 2022 09:04)  T(F): 98.6 (22 Sep 2022 09:04), Max: 98.6 (22 Sep 2022 09:04)  HR: 78 (22 Sep 2022 09:04) (65 - 98)  BP: 135/83 (22 Sep 2022 09:04) (119/69 - 135/83)  BP(mean): --  RR: 17 (22 Sep 2022 09:04) (17 - 18)  SpO2: 97% (22 Sep 2022 09:04) (96% - 98%)    Parameters below as of 22 Sep 2022 09:04  Patient On (Oxygen Delivery Method): room air        09-21-22 @ 07:01  -  09-22-22 @ 07:00  --------------------------------------------------------  IN: 2505 mL / OUT: 175 mL / NET: 2330 mL    09-22-22 @ 07:01  -  09-22-22 @ 10:11  --------------------------------------------------------  IN: 0 mL / OUT: 200 mL / NET: -200 mL      PHYSICAL EXAM:  Constitutional: alert, NAD  Eyes: the sclera and conjunctiva were normal.   ENT: the ears and nose were normal in appearance.   Neck: the appearance of the neck was normal and the neck was supple.   Pulmonary: no respiratory distress and lungs were clear to auscultation bilaterally.   Heart: heart rate was normal and rhythm regular, normal S1 and S2  Vascular:. there was no peripheral edema  Abdomen: normal bowel sounds, soft, non-tender  Neurological: no focal deficits.   Psychiatric: the affect was normal      LABS:                        9.6    8.35  )-----------( 291      ( 22 Sep 2022 07:02 )             30.6     09-22    138  |  104  |  13  ----------------------------<  114<H>  3.8   |  25  |  0.65    Ca    9.2      22 Sep 2022 07:02  Phos  2.8     09-22  Mg     1.8     09-22      PT/INR - ( 21 Sep 2022 06:05 )   PT: 14.4 sec;   INR: 1.21          PTT - ( 21 Sep 2022 06:05 )  PTT:27.1 sec      MICROBIOLOGY:      RADIOLOGY & ADDITIONAL STUDIES:  Reviewed
INTERVAL HPI/OVERNIGHT EVENTS: ASHLEY, VSS,  2nd blood cxs NGTD, pt unable to void in urinal d/t fistula    SUBJECTIVE: Pt seen and examined at bedside this am by surgery team. No acute complaints, feels better today. Tolerating diet, pain well controlled. Denies f/n/v/cp/sob.    MEDICATIONS  (STANDING):  cefTRIAXone   IVPB 1000 milliGRAM(s) IV Intermittent every 24 hours  heparin   Injectable 5000 Unit(s) SubCutaneous every 8 hours  influenza  Vaccine (HIGH DOSE) 0.7 milliLiter(s) IntraMuscular once  lactated ringers. 1000 milliLiter(s) (40 mL/Hr) IV Continuous <Continuous>  metroNIDAZOLE  IVPB 500 milliGRAM(s) IV Intermittent every 8 hours  pantoprazole    Tablet 40 milliGRAM(s) Oral before breakfast    MEDICATIONS  (PRN):  acetaminophen     Tablet .. 650 milliGRAM(s) Oral every 6 hours PRN Mild Pain (1 - 3)  ondansetron Injectable 4 milliGRAM(s) IV Push every 6 hours PRN Nausea      Vital Signs Last 24 Hrs  T(C): 36.7 (22 Sep 2022 05:30), Max: 36.7 (22 Sep 2022 05:30)  T(F): 98.1 (22 Sep 2022 05:30), Max: 98.1 (22 Sep 2022 05:30)  HR: 71 (22 Sep 2022 05:30) (65 - 98)  BP: 129/78 (22 Sep 2022 05:30) (119/69 - 131/64)  BP(mean): --  RR: 18 (22 Sep 2022 05:30) (17 - 18)  SpO2: 97% (22 Sep 2022 05:30) (96% - 98%)    Parameters below as of 22 Sep 2022 05:30  Patient On (Oxygen Delivery Method): room air    PHYSICAL EXAM:    Constitutional: A&Ox3, NAD    Respiratory: non labored breathing, no respiratory distress    Cardiovascular: NSR, RRR    Gastrointestinal: abdomen soft, nd, nt. No rebound or guarding    Extremities: wwp, no calf tenderness or edema. SCDs in place       I&O's Detail    21 Sep 2022 07:01  -  22 Sep 2022 07:00  --------------------------------------------------------  IN:    IV PiggyBack: 50 mL    IV PiggyBack: 300 mL    Lactated Ringers: 975 mL    Oral Fluid: 1180 mL  Total IN: 2505 mL    OUT:    Voided (mL): 175 mL  Total OUT: 175 mL    Total NET: 2330 mL          LABS:                        9.6    8.35  )-----------( 291      ( 22 Sep 2022 07:02 )             30.6     09-22    138  |  104  |  13  ----------------------------<  114<H>  3.8   |  25  |  0.65    Ca    9.2      22 Sep 2022 07:02  Phos  2.8     09-22  Mg     1.8     09-22      PT/INR - ( 21 Sep 2022 06:05 )   PT: 14.4 sec;   INR: 1.21          PTT - ( 21 Sep 2022 06:05 )  PTT:27.1 sec      RADIOLOGY & ADDITIONAL STUDIES:
(covering for Dr. Zhang)  On IV antibiotics  No abdominal pain.  No N/V.   Having daily BM's mixed with urine as before and also passing fecal stained urine per penis.  Ambulating and eating well.  Awaiting home infusion arrangements     VS  Vital Signs Last 24 Hrs  T(C): 37 (25 Sep 2022 09:08), Max: 37 (24 Sep 2022 20:57)  T(F): 98.6 (25 Sep 2022 09:08), Max: 98.6 (24 Sep 2022 20:57)  HR: 75 (25 Sep 2022 09:08) (71 - 78)  BP: 134/81 (25 Sep 2022 09:08) (130/78 - 138/81)  BP(mean): --  RR: 18 (25 Sep 2022 09:08) (18 - 18)  SpO2: 95% (25 Sep 2022 09:08) (95% - 97%)    Parameters below as of 25 Sep 2022 09:08  Patient On (Oxygen Delivery Method): room air    Abd: non tender, not distended, no clear mass  Ext: without calf tenderness     + numerous unrecorded voids plus urine in stool    Labs                        9.4    7.29  )-----------( 323      ( 25 Sep 2022 05:30 )             30.1   09-25    136  |  100  |  7   ----------------------------<  100<H>  4.1   |  26  |  0.70    Ca    9.4      25 Sep 2022 05:30  Phos  3.4     09-25  Mg     1.9     09-25              
Known colovesicle fistula  ? malignancy  BX pending  preop optimization  IVAB  AVSS  Abd soft    Med consult  OOB  IVAB
Malignant fistula  AVSS  abd OK    ID F/U re covid status and blood cultures  encourage PO
arranging Home IVAB  AVSS  ABD soft  surgery 10/3
(covering for Aronoff)  9 Uris  ceftriaxone / flagyl     66 yo man with newly diagnosed sigmoid colon cancer and large diameter colovesical fistula.  Had recent endoscopy that showed the neoplasm that was biopsied.  On antibiotics for UTI.  For several months has been passing feces via urine and urine via his anus.  Has BM's and voids q hour about.   No abdominal pain and no N/V.  Tolerating diet.    Ambulating fine    Vital Signs Last 24 Hrs  T(C): 36.4 (24 Sep 2022 09:00), Max: 36.6 (23 Sep 2022 17:28)  T(F): 97.6 (24 Sep 2022 09:00), Max: 97.9 (24 Sep 2022 00:18)  HR: 66 (24 Sep 2022 09:00) (60 - 68)  BP: 145/84 (24 Sep 2022 09:00) (135/82 - 146/87)  BP(mean): --  RR: 18 (24 Sep 2022 09:00) (17 - 18)  SpO2: 98% (24 Sep 2022 09:00) (97% - 99%)    Parameters below as of 24 Sep 2022 09:00  Patient On (Oxygen Delivery Method): room air    abd: moderately obese, benign, no masses  ext: without calf tenderness    UO 1250 ml/shift;  1550 ml / 24 hours                          9.9    8.29  )-----------( 296      ( 24 Sep 2022 06:21 )             31.8   09-24    137  |  102  |  7   ----------------------------<  93  4.2   |  25  |  0.66    Ca    9.2      24 Sep 2022 06:21  Phos  3.5     09-24  Mg     2.0     09-24          
Admitted for IV abx and plan for surgery     SUBJECTIVE: Patient seen and examined at bedside with chief resident.  This morning he was having bowel movements while we rounded.  Tolerating clear liquid diet.  Denies f/c, n/v, CP, SOB, dysuria, hematuria, weakness or pain in extremities.    cefTRIAXone   IVPB 1000 milliGRAM(s) IV Intermittent every 24 hours  heparin   Injectable 5000 Unit(s) SubCutaneous every 8 hours  metroNIDAZOLE  IVPB 500 milliGRAM(s) IV Intermittent every 8 hours      Vital Signs Last 24 Hrs  T(C): 36.4 (20 Sep 2022 04:56), Max: 36.4 (20 Sep 2022 00:50)  T(F): 97.6 (20 Sep 2022 04:56), Max: 97.6 (20 Sep 2022 04:56)  HR: 71 (20 Sep 2022 04:56) (68 - 95)  BP: 111/70 (20 Sep 2022 04:56) (90/50 - 117/56)  BP(mean): --  RR: 17 (20 Sep 2022 04:56) (16 - 20)  SpO2: 93% (20 Sep 2022 04:56) (93% - 98%)    Parameters below as of 20 Sep 2022 04:56  Patient On (Oxygen Delivery Method): room air      I&O's Detail    19 Sep 2022 07:01  -  20 Sep 2022 07:00  --------------------------------------------------------  IN:    IV PiggyBack: 100 mL  Total IN: 100 mL    OUT:    Voided (mL): 375 mL  Total OUT: 375 mL    Total NET: -275 mL    General: NAD, resting comfortably in bed  C/V: NSR  Pulm: Nonlabored breathing, no respiratory distress  Abd: Soft, nontender, slightly distended, well healing scars  Extrem: WWP, no edema, SCDs in place      LABS:                        8.6    10.29 )-----------( 212      ( 20 Sep 2022 07:15 )             27.5     09-20    137  |  105  |  17  ----------------------------<  84  4.0   |  23  |  0.64    Ca    9.1      20 Sep 2022 07:15  Phos  3.1       Mg     2.7         TPro  5.0<L>  /  Alb  2.4<L>  /  TBili  0.4  /  DBili  x   /  AST  76<H>  /  ALT  23  /  AlkPhos  88  -    PT/INR - ( 20 Sep 2022 07:33 )   PT: 15.1 sec;   INR: 1.27          PTT - ( 20 Sep 2022 07:33 )  PTT:28.2 sec  Urinalysis Basic - ( 20 Sep 2022 02:30 )    Color: Yellow / Appearance: Clear / S.020 / pH: x  Gluc: x / Ketone: Trace mg/dL  / Bili: Negative / Urobili: 2.0 E.U./dL   Blood: x / Protein: 100 mg/dL / Nitrite: NEGATIVE   Leuk Esterase: Moderate / RBC: > 10 /HPF / WBC Many /HPF   Sq Epi: x / Non Sq Epi: 0-5 /HPF / Bacteria: Many /HPF        RADIOLOGY & ADDITIONAL STUDIES:  
INTERVAL HPI/OVERNIGHT EVENTS: ASHLEY    STATUS POST:      POST OPERATIVE DAY #:     SUBJECTIVE:      cefTRIAXone   IVPB 1000 milliGRAM(s) IV Intermittent every 24 hours  heparin   Injectable 5000 Unit(s) SubCutaneous every 8 hours  metroNIDAZOLE  IVPB 500 milliGRAM(s) IV Intermittent every 8 hours      Vital Signs Last 24 Hrs  T(C): 36.7 (25 Sep 2022 14:40), Max: 37 (24 Sep 2022 20:57)  T(F): 98 (25 Sep 2022 14:40), Max: 98.6 (24 Sep 2022 20:57)  HR: 77 (25 Sep 2022 14:40) (75 - 78)  BP: 132/78 (25 Sep 2022 14:40) (132/78 - 138/81)  BP(mean): --  RR: 17 (25 Sep 2022 14:40) (17 - 18)  SpO2: 98% (25 Sep 2022 14:40) (95% - 98%)    Parameters below as of 25 Sep 2022 14:40  Patient On (Oxygen Delivery Method): room air      I&O's Detail    24 Sep 2022 07:01  -  25 Sep 2022 07:00  --------------------------------------------------------  IN:    IV PiggyBack: 50 mL    IV PiggyBack: 300 mL    Oral Fluid: 600 mL  Total IN: 950 mL    OUT:    Voided (mL): 750 mL  Total OUT: 750 mL    Total NET: 200 mL      25 Sep 2022 07:01  -  25 Sep 2022 17:44  --------------------------------------------------------  IN:    IV PiggyBack: 200 mL    Oral Fluid: 590 mL  Total IN: 790 mL    OUT:    Voided (mL): 650 mL  Total OUT: 650 mL    Total NET: 140 mL          General: NAD, resting comfortably in bed  C/V: NSR  Pulm: Nonlabored breathing, no respiratory distress  Abd: soft, NT/ND.  Extrem: WWP, no edema, SCDs in place  Drains:  Zane:      LABS:                        9.4    7.29  )-----------( 323      ( 25 Sep 2022 05:30 )             30.1     09-25    136  |  100  |  7   ----------------------------<  100<H>  4.1   |  26  |  0.70    Ca    9.4      25 Sep 2022 05:30  Phos  3.4     09-25  Mg     1.9     09-25            RADIOLOGY & ADDITIONAL STUDIES:  
Overnight: brett regular diet, -n/-v, +bf, voids difficult to save d/t colovesicular fistula     Hospital Day 3    SUBJECTIVE:  Patient is doing well this morning, seen at bedside with chief, denies any new complaints. Denies any nausea, vomiting, chest pain, shortness of breath, calf tenderness, fever or chills. Reports +bm/+f. Tolerating diet, ambulating as tolerated.    MEDICATIONS  (STANDING):  cefTRIAXone   IVPB 1000 milliGRAM(s) IV Intermittent every 24 hours  heparin   Injectable 5000 Unit(s) SubCutaneous every 8 hours  influenza  Vaccine (HIGH DOSE) 0.7 milliLiter(s) IntraMuscular once  lactated ringers. 1000 milliLiter(s) (40 mL/Hr) IV Continuous <Continuous>  metroNIDAZOLE  IVPB 500 milliGRAM(s) IV Intermittent every 8 hours  pantoprazole    Tablet 40 milliGRAM(s) Oral before breakfast    MEDICATIONS  (PRN):  acetaminophen     Tablet .. 650 milliGRAM(s) Oral every 6 hours PRN Mild Pain (1 - 3)  ondansetron Injectable 4 milliGRAM(s) IV Push every 6 hours PRN Nausea      Vital Signs Last 24 Hrs  T(C): 36.5 (23 Sep 2022 08:50), Max: 36.9 (22 Sep 2022 13:36)  T(F): 97.7 (23 Sep 2022 08:50), Max: 98.5 (22 Sep 2022 13:36)  HR: 64 (23 Sep 2022 08:50) (64 - 76)  BP: 134/84 (23 Sep 2022 08:50) (132/79 - 143/86)  BP(mean): 105 (23 Sep 2022 05:23) (105 - 108)  RR: 17 (23 Sep 2022 08:50) (17 - 18)  SpO2: 98% (23 Sep 2022 08:50) (97% - 99%)    Parameters below as of 23 Sep 2022 08:50  Patient On (Oxygen Delivery Method): room air        PHYSICAL EXAM:  Constitutional: AAOx3, no acute distress  HEENT: NCAT, airway patent  Cardiovascular: RRR, pulses present bilaterally  Respiratory: nonlabored breathing  Gastrointestinal: abdomen soft, nontender, non distended, no rebound or guarding  Neuro: no focal deficits     I&O's Detail    22 Sep 2022 07:01  -  23 Sep 2022 07:00  --------------------------------------------------------  IN:    IV PiggyBack: 100 mL    Lactated Ringers: 440 mL    Oral Fluid: 480 mL  Total IN: 1020 mL    OUT:    Voided (mL): 500 mL  Total OUT: 500 mL    Total NET: 520 mL          LABS:                        9.7    8.54  )-----------( 302      ( 23 Sep 2022 06:44 )             30.8     09-23    138  |  102  |  10  ----------------------------<  104<H>  4.3   |  25  |  0.74    Ca    9.1      23 Sep 2022 06:44  Phos  3.1     09-23  Mg     2.0     09-23            RADIOLOGY & ADDITIONAL STUDIES:
SUBJECTIVE: Pt seen and examined at bedside with chief. Pt denies any complaints. Pain well controlled. Tolerating diet without N/V.    MEDICATIONS  (STANDING):  cefTRIAXone   IVPB 1000 milliGRAM(s) IV Intermittent every 24 hours  heparin   Injectable 5000 Unit(s) SubCutaneous every 8 hours  influenza  Vaccine (HIGH DOSE) 0.7 milliLiter(s) IntraMuscular once  lactated ringers. 1000 milliLiter(s) (110 mL/Hr) IV Continuous <Continuous>  metroNIDAZOLE  IVPB 500 milliGRAM(s) IV Intermittent every 8 hours  pantoprazole    Tablet 40 milliGRAM(s) Oral before breakfast    MEDICATIONS  (PRN):  acetaminophen     Tablet .. 650 milliGRAM(s) Oral every 6 hours PRN Mild Pain (1 - 3)  ondansetron Injectable 4 milliGRAM(s) IV Push every 6 hours PRN Nausea      Vital Signs Last 24 Hrs  T(C): 36.3 (21 Sep 2022 05:17), Max: 36.7 (20 Sep 2022 14:16)  T(F): 97.3 (21 Sep 2022 05:17), Max: 98.1 (20 Sep 2022 14:16)  HR: 63 (21 Sep 2022 05:17) (63 - 77)  BP: 127/80 (21 Sep 2022 05:17) (99/63 - 127/80)  BP(mean): 75 (20 Sep 2022 21:04) (75 - 75)  RR: 16 (21 Sep 2022 05:17) (16 - 17)  SpO2: 99% (21 Sep 2022 05:17) (94% - 99%)    Parameters below as of 21 Sep 2022 05:17  Patient On (Oxygen Delivery Method): room air        PHYSICAL EXAM:      Constitutional: A&Ox3    Respiratory: non labored breathing, no respiratory distress    Cardiovascular: NSR, RRR    Gastrointestinal: Soft ND, NT, no rebound or guarding    Extremities: (-)  edema                  I&O's Detail    20 Sep 2022 07:01  -  21 Sep 2022 07:00  --------------------------------------------------------  IN:    IV PiggyBack: 100 mL    Lactated Ringers: 330 mL    Oral Fluid: 1430 mL  Total IN: 1860 mL    OUT:    Voided (mL): 650 mL  Total OUT: 650 mL    Total NET: 1210 mL          LABS:                        9.9    11.30 )-----------( 253      ( 21 Sep 2022 06:05 )             31.3         139  |  106  |  19  ----------------------------<  124<H>  4.0   |  23  |  0.64    Ca    9.2      21 Sep 2022 06:05  Phos  2.5       Mg     1.9         TPro  5.0<L>  /  Alb  2.4<L>  /  TBili  0.4  /  DBili  x   /  AST  76<H>  /  ALT  23  /  AlkPhos  88  20    PT/INR - ( 21 Sep 2022 06:05 )   PT: 14.4 sec;   INR: 1.21          PTT - ( 21 Sep 2022 06:05 )  PTT:27.1 sec  Urinalysis Basic - ( 20 Sep 2022 02:30 )    Color: Yellow / Appearance: Clear / S.020 / pH: x  Gluc: x / Ketone: Trace mg/dL  / Bili: Negative / Urobili: 2.0 E.U./dL   Blood: x / Protein: 100 mg/dL / Nitrite: NEGATIVE   Leuk Esterase: Moderate / RBC: > 10 /HPF / WBC Many /HPF   Sq Epi: x / Non Sq Epi: 0-5 /HPF / Bacteria: Many /HPF        RADIOLOGY & ADDITIONAL STUDIES:
  SUBJECTIVE: Patient seen and examined bedside by chief resident. This morning, he feels well; his pain is well-controlled. No nausea or vomiting. Passing flatus and having BMs. No acute complaints.     cefTRIAXone   IVPB 1000 milliGRAM(s) IV Intermittent every 24 hours  heparin   Injectable 5000 Unit(s) SubCutaneous every 8 hours  metroNIDAZOLE  IVPB 500 milliGRAM(s) IV Intermittent every 8 hours      Vital Signs Last 24 Hrs  T(C): 37.1 (26 Sep 2022 05:43), Max: 37.4 (26 Sep 2022 00:17)  T(F): 98.8 (26 Sep 2022 05:43), Max: 99.4 (26 Sep 2022 00:17)  HR: 66 (26 Sep 2022 05:43) (64 - 77)  BP: 131/77 (26 Sep 2022 05:43) (128/74 - 144/82)  BP(mean): --  RR: 18 (26 Sep 2022 05:43) (17 - 18)  SpO2: 96% (26 Sep 2022 05:43) (95% - 98%)    Parameters below as of 26 Sep 2022 05:43  Patient On (Oxygen Delivery Method): room air      I&O's Detail    25 Sep 2022 07:01  -  26 Sep 2022 07:00  --------------------------------------------------------  IN:    IV PiggyBack: 50 mL    IV PiggyBack: 400 mL    Oral Fluid: 590 mL  Total IN: 1040 mL    OUT:    Voided (mL): 875 mL  Total OUT: 875 mL    Total NET: 165 mL          General: NAD, resting comfortably in bed  C/V: NSR  Pulm: Nonlabored breathing, no respiratory distress  Abd: soft, NT/ND.  Extrem: WWP, no edema, SCDs in place        LABS:                        9.4    7.29  )-----------( 323      ( 25 Sep 2022 05:30 )             30.1     09-25    136  |  100  |  7   ----------------------------<  100<H>  4.1   |  26  |  0.70    Ca    9.4      25 Sep 2022 05:30  Phos  3.4     09-25  Mg     1.9     09-25            RADIOLOGY & ADDITIONAL STUDIES:  
INFECTIOUS DISEASES CONSULT FOLLOW-UP NOTE    INTERVAL HPI/OVERNIGHT EVENTS: naeo.       ROS:   Constitutional, eyes, ENT, cardiovascular, respiratory, gastrointestinal, genitourinary, integumentary, neurological, psychiatric and heme/lymph are otherwise negative other than noted above       ANTIBIOTICS/RELEVANT:    MEDICATIONS  (STANDING):  cefTRIAXone   IVPB 1000 milliGRAM(s) IV Intermittent every 24 hours  heparin   Injectable 5000 Unit(s) SubCutaneous every 8 hours  influenza  Vaccine (HIGH DOSE) 0.7 milliLiter(s) IntraMuscular once  metroNIDAZOLE  IVPB 500 milliGRAM(s) IV Intermittent every 8 hours  pantoprazole    Tablet 40 milliGRAM(s) Oral before breakfast    MEDICATIONS  (PRN):  acetaminophen     Tablet .. 650 milliGRAM(s) Oral every 6 hours PRN Mild Pain (1 - 3)  ondansetron Injectable 4 milliGRAM(s) IV Push every 6 hours PRN Nausea        Vital Signs Last 24 Hrs  T(C): 36.6 (23 Sep 2022 20:25), Max: 36.8 (23 Sep 2022 00:40)  T(F): 97.8 (23 Sep 2022 20:25), Max: 98.3 (23 Sep 2022 00:40)  HR: 68 (23 Sep 2022 20:25) (64 - 76)  BP: 135/82 (23 Sep 2022 20:25) (134/84 - 146/87)  BP(mean): 105 (23 Sep 2022 05:23) (105 - 108)  RR: 17 (23 Sep 2022 20:25) (17 - 17)  SpO2: 97% (23 Sep 2022 20:25) (97% - 99%)    Parameters below as of 23 Sep 2022 20:25  Patient On (Oxygen Delivery Method): room air        09-22-22 @ 07:01  -  09-23-22 @ 07:00  --------------------------------------------------------  IN: 1020 mL / OUT: 500 mL / NET: 520 mL    09-23-22 @ 07:01  -  09-23-22 @ 21:56  --------------------------------------------------------  IN: 1770 mL / OUT: 700 mL / NET: 1070 mL    PHYSICAL EXAM:  Constitutional: alert, NAD  Eyes: the sclera and conjunctiva were normal.   ENT: the ears and nose were normal in appearance.   Neck: the appearance of the neck was normal and the neck was supple.   Pulmonary: no respiratory distress and lungs were clear to auscultation bilaterally.   Heart: heart rate was normal and rhythm regular, normal S1 and S2  Vascular:. there was no peripheral edema  Abdomen: normal bowel sounds, soft, non-tender  Neurological: no focal deficits.   Psychiatric: the affect was normal        LABS:                        9.7    8.54  )-----------( 302      ( 23 Sep 2022 06:44 )             30.8     09-23    138  |  102  |  10  ----------------------------<  104<H>  4.3   |  25  |  0.74    Ca    9.1      23 Sep 2022 06:44  Phos  3.1     09-23  Mg     2.0     09-23            MICROBIOLOGY:      RADIOLOGY & ADDITIONAL STUDIES:  Reviewed
Patient is a 67y old  Male who presents with a chief complaint of urosepsis 2/2 malignant colovesical fistula.     ED: Afebrile, VSS, WBC 18 Hb 9, glucose 142. CT scan showing the chronic large colovesical fistula, bladder is diffusely thickened and inflamed, numerous outpouchings of the urinary bladder which may be incomplete fistula tracks bladder diverticula. Large amount of non dependent intraluminal bladder gas, numerous nonobstructive bladder stones      GENERAL SURGERY PROGRESS NOTE    SUBJECTIVE: Patient seen and examined bedside.    cefTRIAXone   IVPB 1000 milliGRAM(s) IV Intermittent every 24 hours  heparin   Injectable 5000 Unit(s) SubCutaneous every 8 hours  metroNIDAZOLE  IVPB 500 milliGRAM(s) IV Intermittent every 8 hours      Vital Signs Last 24 Hrs  T(C): 36.7 (22 Sep 2022 05:30), Max: 36.7 (22 Sep 2022 05:30)  T(F): 98.1 (22 Sep 2022 05:30), Max: 98.1 (22 Sep 2022 05:30)  HR: 71 (22 Sep 2022 05:30) (65 - 98)  BP: 129/78 (22 Sep 2022 05:30) (119/69 - 131/64)  BP(mean): --  RR: 18 (22 Sep 2022 05:30) (17 - 18)  SpO2: 97% (22 Sep 2022 05:30) (96% - 98%)    Parameters below as of 22 Sep 2022 05:30  Patient On (Oxygen Delivery Method): room air      I&O's Detail    21 Sep 2022 07:01  -  22 Sep 2022 07:00  --------------------------------------------------------  IN:    IV PiggyBack: 50 mL    IV PiggyBack: 300 mL    Lactated Ringers: 975 mL    Oral Fluid: 1180 mL  Total IN: 2505 mL    OUT:    Voided (mL): 175 mL  Total OUT: 175 mL    Total NET: 2330 mL      22 Sep 2022 07:01  -  22 Sep 2022 09:23  --------------------------------------------------------  IN:  Total IN: 0 mL    OUT:    Voided (mL): 200 mL  Total OUT: 200 mL    Total NET: -200 mL        PE:  Constitutional: A&Ox3, NAD  Respiratory: non labored breathing, no respiratory distress  Cardiovascular: NSR, RRR  Gastrointestinal: abdomen soft, nd, nt. No rebound or guarding  Extremities: wwp, no calf tenderness or edema. SCDs in place         LABS:                        9.6    8.35  )-----------( 291      ( 22 Sep 2022 07:02 )             30.6     09-22    138  |  104  |  13  ----------------------------<  114<H>  3.8   |  25  |  0.65    Ca    9.2      22 Sep 2022 07:02  Phos  2.8     09-22  Mg     1.8     09-22      PT/INR - ( 21 Sep 2022 06:05 )   PT: 14.4 sec;   INR: 1.21          PTT - ( 21 Sep 2022 06:05 )  PTT:27.1 sec      RADIOLOGY & ADDITIONAL STUDIES:        Imaging: CT scan impressions  IMPRESSION:  1. As depicted on prior study there is a large colovesical fistula. Urinary  bladder is diffusely thickened and inflamed compatible with cystitis. There are  numerous outpouchings of the urinary bladder which may be incomplete fistula  tracks bladder diverticula. Large amount of non dependent intraluminal bladder  gas. Numerous nonobstructing bladder stones.  2. Splenomegaly.  3. Trace bilateral pleural effusions.  4. No intestinal obstruction. Small volume ascites in the pelvis.  5. Incompletely visualized large right scrotal hydrocele.    
General Surgery Progress Note    SUBJECTIVE:   Patient seen and examined at bedside by chief and attending during AM rounds. Patient has no acute complaints at this time. Reports he is passing flatus and having liquid stools. States stools are almost every hour Still reports fecaluria. States he is tolerating regular diet without N,V. Wishes to go home soon.    Vital Signs Last 24 Hrs  T(C): 36.9 (24 Sep 2022 13:00), Max: 36.9 (24 Sep 2022 13:00)  T(F): 98.5 (24 Sep 2022 13:00), Max: 98.5 (24 Sep 2022 13:00)  HR: 71 (24 Sep 2022 13:00) (60 - 71)  BP: 130/78 (24 Sep 2022 13:00) (130/78 - 146/87)  BP(mean): --  RR: 18 (24 Sep 2022 13:00) (17 - 18)  SpO2: 97% (24 Sep 2022 13:00) (97% - 99%)    Parameters below as of 24 Sep 2022 13:00  Patient On (Oxygen Delivery Method): room air        I&O's Summary    23 Sep 2022 07:01  -  24 Sep 2022 07:00  --------------------------------------------------------  IN: 2110 mL / OUT: 1550 mL / NET: 560 mL    24 Sep 2022 07:01  -  24 Sep 2022 15:18  --------------------------------------------------------  IN: 700 mL / OUT: 0 mL / NET: 700 mL        Physical Exam:  General: Resting comfortably in bed, NAD  HEENT: ATNC  Pulmonary: Nonlabored breathing, no respiratory distress, no accessory muscle use noted  Cardiovascular: NSR  Abdomen: Soft, nondistended, nontender to palpation. No rebound or guarding  Extremities: WWP, SCDs in place, No significant edema appreciated    LABS:                        9.9    8.29  )-----------( 296      ( 24 Sep 2022 06:21 )             31.8     09-24    137  |  102  |  7   ----------------------------<  93  4.2   |  25  |  0.66    Ca    9.2      24 Sep 2022 06:21  Phos  3.5     09-24  Mg     2.0     09-24

## 2022-09-26 NOTE — PROGRESS NOTE ADULT - NUTRITIONAL ASSESSMENT
This patient has been assessed with a concern for Malnutrition and has been determined to have a diagnosis/diagnoses of Moderate protein-calorie malnutrition.    This patient is being managed with:   Diet Regular-  Supplement Feeding Modality:  Oral  Ensure Max Cans or Servings Per Day:  1       Frequency:  Two Times a day  Entered: Sep 22 2022 12:30PM    

## 2022-09-26 NOTE — PROGRESS NOTE ADULT - ASSESSMENT
/68 yo M with PMH HTN, HLD, asthma, diverticulitis with colovescicular fistula and colon cancer s/p R hemicolectomy (2016) presenting with weakness and hypoglycemia. In the ED afebrile, VSS, WBC 18 Hb 9, glucose 142. CT with colovesicular fistula, thickened and chronically inflamed sigmoid, bladder distended and inflamed, consistent with cystitis, ascites in the pelvis. Patient will likely need surgery sooner than planned given chronic UTI/concern for impending urosepsis. Admited for IV abx and plan for surgery.    reg diet  Pain/Nausea PRN  Ceft/flagyl  HSQ/SCD  OOBA/IS  AM labs  f/u BCX 9/20  ID recs  Urology recs  
66 yo M with PMH HTN, HLD, asthma, diverticulitis with colovescicular fistula and colon cancer s/p R hemicolectomy (2016) presenting with weakness and hypoglycemia. In the ED afebrile, VSS, WBC 18 Hb 9, glucose 142. CT with colovesicular fistula, thickened and chronically inflamed sigmoid, bladder distended and inflamed, consistent with cystitis, ascites in the pelvis. Patient will likely need surgery sooner than planned given chronic UTI/concern for impending urosepsis. Admitted for IV abx and plan for surgery.     Reg diet  Pain/nausea control prn  Ceftriaxone/flagyl  HSQ/SCDs  OOBA/IS  F/u ID recs  F/u Urology recs  DC home on CTX/Flagyl until 10/3
66 yo M with PMH HTN, HLD, asthma, diverticulitis with colovescicular fistula and colon cancer s/p R hemicolectomy (2016) presenting with weakness and hypoglycemia. In the ED afebrile, VSS, WBC 18 Hb 9, glucose 142. CT with colovesicular fistula, thickened and chronically inflamed sigmoid, bladder distended and inflamed, consistent with cystitis, ascites in the pelvis. Patient will likely need surgery sooner than planned given chronic UTI/concern for impending urosepsis. Admitted for IV abx and plan for surgery.    CLD  Pain/Nausea PRN  Ceft/flagyl  HSQ/SCD  OOBA/IS  AM labs  Urology recs  
66 yo M with PMH HTN, HLD, asthma, diverticulitis with colovescicular fistula and colon cancer s/p R hemicolectomy (2016) presenting with weakness and hypoglycemia. In the ED afebrile, VSS, WBC 18 Hb 9, glucose 142. CT with colovesicular fistula, thickened and chronically inflamed sigmoid, bladder distended and inflamed, consistent with cystitis, ascites in the pelvis. Patient will likely need surgery sooner than planned given chronic UTI/concern for impending urosepsis. Admitted for IV abx and plan for surgery.    Reg diet  Will place PICC/TPN if cleared by ID   Pain/nausea control prn  Ceftriaxone/flagyl  HSQ/SCDs  OOBA/IS  F/u ID recs  F/u Urology recs      
66 yo M with PMH HTN, HLD, asthma, diverticulitis with colovescicular fistula and colon cancer s/p R hemicolectomy (2016) presenting with weakness and hypoglycemia. In the ED afebrile, VSS, WBC 18 Hb 9, glucose 142. CT with colovesicular fistula, thickened and chronically inflamed sigmoid, bladder distended and inflamed, consistent with cystitis, ascites in the pelvis. Patient will likely need surgery sooner than planned given chronic UTI/concern for impending urosepsis. Admitted for IV abx and plan for surgery.    Reg diet  Will place PICC/TPN if cleared by ID   Pain/nausea control prn  Ceftriaxone/flagyl  HSQ/SCDs  OOBA/IS  d/c with PICC on IV ceftriaxone and PO flagyl   F/u Urology recs
Patient is a 67M w/ colovesical fistula known to Dr. Rosas and Dr. Zhang, currently admitted for urosepsis i/s/o large colovesical fistula with recent colonoscopy last Friday. Patient currently HDS, abdominal exam benign and non-tender with no evidence of rebound or guarding. Final surgical plan pending general surgery.    Plan:  - Agree with excellent care per primary team  - Patient cleared by medicine as intermediate risk for intermediate risk procedure  - Please notify Urology once surgical plan re: poss. sigmoidectomy, Urology available to assist with possible cystorraphy  - f/u ID recommendations, continue to trend CBC/BCx
66 yo M with PMH HTN, HLD, asthma, diverticulitis with colovescicular fistula and colon cancer s/p R hemicolectomy (2016) presenting with weakness and hypoglycemia. In the ED afebrile, VSS, WBC 18 Hb 9, glucose 142. CT with colovesicular fistula, thickened and chronically inflamed sigmoid, bladder distended and inflamed, consistent with cystitis, ascites in the pelvis. Patient will likely need surgery sooner than planned given chronic UTI/concern for impending urosepsis. Admitted for IV abx and plan for surgery. Awaiting home infusion services set up from social work    Continue regular diet - tolerating well without N,V.  PICC line in place - will likely discahrge patient on PO flagyl and IV ceftriaxone   Pain/nausea control prn  Ceftriaxone/flagyl  HSQ/SCDs  OOBA/IS  PPI  DISPO: Awaiting home infusion services set up prior to discharge
A/P.  Colovesical fistula from colon malignancy.  Stable on antibiotics   Await home infusion arrangements then discharge home on IV antibiotics  Exam and status unchanged  Elective surgery in few weeks as per Dr. Zhang
68 yo M with colovesicular fistula, recovering well, awaiting home infusions for discharge    Reg diet  Pain/nausea control prn  Ceftriaxone/flagyl  HSQ/SCDs  OOBA/IS  F/u ID recs  F/u Urology recs  
67M h/o diverticulitis with colovesicular fistula (dx in 7/2022), colon cancer s/p R hemicolectomy in 2016 in remission, HTN, HLD, asthma p/w weakness and hypoglycemia. Admitted with plan of repair of colevesicular fistula tomorrow and ID consulted for diverticulitis as well as CoNS staph bacteremia. patient with new diagnosis of malignancy from outpatient colonoscopy with plan for surgery 10/03    - Patient should get midline (ok to place since 9/19 BCx contaminant and repeat BCx 9/21 ngtd)  - On discharge -- patient should be receiving ceftriaxone 1g IV q24h & Flagyl 500mg PO q8h  - Duration of antibiotics is until 10/3 which is when he is planned for surgery     ID Will sign off at this time. 
67M h/o diverticulitis with colovesicular fistula (dx in 7/2022), colon cancer s/p R hemicolectomy in 2016 in remission, HTN, HLD, asthma p/w weakness and hypoglycemia. Admitted with plan of repair of colevesicular fistula tomorrow and ID consulted for diverticulitis as well as CoNS staph bacteremia. patient with new diagnosis of malignancy from outpatient colonoscopy with plan for surgery 10/03    - Patient should get midline (ok to place since 9/19 BCx contaminant and repeat BCx 9/21 ngtd)  - On discharge -- patient should be receiving ceftriaxone 1g IV q24h & Flagyl 500mg PO q8h  - Duration of antibiotics is until 10/3 which is when he is planned for surgery     Will continue to follow
A/P Fistula from tumor that must be good diameter since urine emptying via anus.  On antibiotics that are to be continued as outpatient until surgery to be done.  Has PICC line  Awaiting home infusion service arrangements  otherwise stable.  To continue on regular diet.  fluid status seems ok.    encourage ambulation

## 2022-09-26 NOTE — DISCHARGE NOTE NURSING/CASE MANAGEMENT/SOCIAL WORK - PATIENT PORTAL LINK FT
You can access the FollowMyHealth Patient Portal offered by [facility] by registering at the following website: [email or web address]. By joining [employer]’s FollowMyHealth portal, you will also be able to view your health information using other applications (apps) compatible with our system.

## 2022-09-26 NOTE — DISCHARGE NOTE PROVIDER - NSDCFUADDINST_GEN_ALL_CORE_FT
Please follow up with Dr. Zhang.  Call his office to schedule an appointment: [phone].     You have been prescribed oral antibiotics. Please be sure to complete the entire course as directed.     General Discharge Instructions:  Please resume all regular home medications unless specifically advised not to take a particular medication. Also, please take any new medications as prescribed.  Please get plenty of rest, continue to ambulate several times per day, and drink adequate amounts of fluids. Avoid lifting weights greater than 5-10 lbs until you follow-up with your surgeon, who will instruct you further regarding activity restrictions.  Avoid driving or operating heavy machinery while taking pain medications.  Please follow-up with your surgeon and Primary Care Provider (PCP) as advised.   Warning Signs:  Please call your doctor or nurse practitioner if you experience the following:  *You experience new chest pain, pressure, squeezing or tightness.  *New or worsening cough, shortness of breath, or wheeze.  *If you are vomiting and cannot keep down fluids or your medications.  *You are getting dehydrated due to continued vomiting, diarrhea, or other reasons. Signs of dehydration include dry mouth, rapid heartbeat, or feeling dizzy or faint when standing.  *You see blood or dark/black material when you vomit or have a bowel movement.  *You experience burning when you urinate, have blood in your urine, or experience a discharge.  *Your pain is not improving within 8-12 hours or is not gone within 24 hours. Call or return immediately if your pain is getting worse, changes location, or moves to your chest or back.  *You have shaking chills, or fever greater than 101.5 degrees Fahrenheit or 38 degrees Celsius.  *Any change in your symptoms, or any new symptoms that concern you.  Please follow up with Dr. Zhang.  Call his office to schedule an appointment: [phone].   Please follow up with Dr Rosas. Call his office to schedule an appointment: [phone]    You have been prescribed oral antibiotics. Please be sure to complete the entire course as directed.     General Discharge Instructions:  Please resume all regular home medications unless specifically advised not to take a particular medication. Also, please take any new medications as prescribed.  Please get plenty of rest, continue to ambulate several times per day, and drink adequate amounts of fluids. Avoid lifting weights greater than 5-10 lbs until you follow-up with your surgeon, who will instruct you further regarding activity restrictions.  Avoid driving or operating heavy machinery while taking pain medications.  Please follow-up with your surgeon and Primary Care Provider (PCP) as advised.   Warning Signs:  Please call your doctor or nurse practitioner if you experience the following:  *You experience new chest pain, pressure, squeezing or tightness.  *New or worsening cough, shortness of breath, or wheeze.  *If you are vomiting and cannot keep down fluids or your medications.  *You are getting dehydrated due to continued vomiting, diarrhea, or other reasons. Signs of dehydration include dry mouth, rapid heartbeat, or feeling dizzy or faint when standing.  *You see blood or dark/black material when you vomit or have a bowel movement.  *You experience burning when you urinate, have blood in your urine, or experience a discharge.  *Your pain is not improving within 8-12 hours or is not gone within 24 hours. Call or return immediately if your pain is getting worse, changes location, or moves to your chest or back.  *You have shaking chills, or fever greater than 101.5 degrees Fahrenheit or 38 degrees Celsius.  *Any change in your symptoms, or any new symptoms that concern you.

## 2022-09-26 NOTE — DISCHARGE NOTE PROVIDER - NSDCMRMEDTOKEN_GEN_ALL_CORE_FT
cefTRIAXone 1 g injection: 1 gram(s) intravenously once a day. Stop date 10/3 per ID.  Heparin Lock Flush 10 units/mL intravenous solution: 3 unit(s) intravenously once a day. Post infusion flush.   losartan 50 mg oral tablet: 1 tab(s) orally once a day  metroNIDAZOLE 500 mg oral tablet: 1 tab(s) orally every 8 hours. End date 10/3  Normal Saline Flush 0.9% injectable solution: 10 milliliter(s) injectable 2 times a day pre/post infusion flush  simvastatin 40 mg oral tablet: 1 tab(s) orally once a day (at bedtime)

## 2022-09-26 NOTE — DISCHARGE NOTE NURSING/CASE MANAGEMENT/SOCIAL WORK - NSDCPEFALRISK_GEN_ALL_CORE
For information on Fall & Injury Prevention, visit: [email or web address] OR  [email or web address] OR  https://www.cdc.gov/steadi/patient.html

## 2022-09-26 NOTE — DISCHARGE NOTE PROVIDER - CARE PROVIDER_API CALL
Moreno Zhang  COLON/RECTAL SURGERY  [address], [address]  [place], [place] [facility]  Phone: [phone]  Fax: [phone]  Follow Up Time:    Moreno Zhang  COLON/RECTAL SURGERY  [address], [address]  [place] [address]  Phone: [phone]  Fax: [phone]  Follow Up Time:     Saqib Rosas  Urology  [address], [place] [address]  Phone: [phone]  Fax: [phone]  Follow Up Time:

## 2022-09-26 NOTE — DISCHARGE NOTE PROVIDER - CARE PROVIDERS DIRECT ADDRESSES
,DirectAddress_Unknown ,DirectAddress_Unknown,[email or web address]

## 2022-09-26 NOTE — PROGRESS NOTE ADULT - PROVIDER SPECIALTY LIST ADULT
Colorectal Surgery
Infectious Disease
Infectious Disease
Surgery
Urology
Colorectal Surgery
Infectious Disease
Surgery
Colorectal Surgery
Surgery

## 2022-09-26 NOTE — DISCHARGE NOTE PROVIDER - PROVIDER TOKENS
PROVIDER:[TOKEN:[[id number]]] PROVIDER:[TOKEN:[[id number]]],PROVIDER:[TOKEN:[[id number]]]

## 2022-09-26 NOTE — DISCHARGE NOTE PROVIDER - HOSPITAL COURSE
66 yo M with PMH HTN, HLD, asthma, diverticulitis with colovescicular fistula and colon cancer s/p R hemicolectomy (2016) who presented to [facility] with weakness and hypoglycemia. Patient had been feeling progressively weaker for the past week. Prior to admission he was going to bathroom and couldn't get up after sitting down. EMS was called and he was noted to have hypoglycemia. He was given D10 en route to the hospital. In the ED he denied nausea/vomiting. He denied dysuria and hematochezia but has been having worsening fecalurea and more urine in his stool. Patient saw Dr. Zhang last friday for a colonoscopy and biopsies were taken for planned elective surgery. On admission, his WBC was 17 and temp of 96.5. Due to concerns for urosepsis, the patient was started on CTX/flagyl and blood cultures were drawn. Blood cultures grew staph saprophyticus which was considered contaminant. Repeat blood cultures were negative for 48 hours and the decision was made to place a PICC line for outpatient antibiotic treatment until surgery. The rest of the patient's hospital course was unremarkable. Their pain was well controlled, they were able to tolerate a regular diet without nausea or vomiting, and they were able to ambulate without assistance. At time of discharge, patient was afebrile, HDS, and deemed clinically fit for discharge home with a PICC line on Ceftriaxone 1gq24 and flagyl 500mg q8.

## 2022-09-26 NOTE — DISCHARGE NOTE PROVIDER - NSDCCPCAREPLAN_GEN_ALL_CORE_FT
PRINCIPAL DISCHARGE DIAGNOSIS  Diagnosis: Sepsis  Assessment and Plan of Treatment: urosepsis 2/2 colovesicular fistula      SECONDARY DISCHARGE DIAGNOSES  Diagnosis: Hypoglycemia  Assessment and Plan of Treatment:     Diagnosis: Diarrhea  Assessment and Plan of Treatment:

## 2022-09-26 NOTE — PROGRESS NOTE ADULT - ATTENDING COMMENTS
No event overnight.  Patient feels better.  Discussed about outpatient abx infusion regimen and risks and benefits, when to call me.  Please set up infusion as per my yesterday's note.      Thank you for your consult.  Please re-consult us or call us with questions.  Case d/w primary team.    Adeline St MD, MS  Infectious Disease attending  work cell [phone]  For any questions during evening/weekend/holiday, please page ID on call
No event overnight. Patient feels depressed to find out that he has recurrent colon cancer which is the cause of colovesical fistula.  He said path result just came back and was told the news.  He thinks he doesn't have much diarrhea today, no abdominal pain.  WBC normal at 8, Cr 0.65.  BCx 9/19 grew staph saprophyticus from one bottle, and two bottles most likely staph xylosus - which is highly likely contaminant, as different CoNS spp grew and he doesn't have risk factor for CoNS bacteremia.  Repeat BCx 9/21 ngtd.  Case d/w Dr. Zhang - patient has recurrent colon cancer invading into colovesical fistula and he need resection - may need total colectomy or partial colectomy with diversion, and cystectomy as well with .  Due to scheduling, his surgery is planned on 10/3.  Patient came in septic due to cystitis from fistula and he is high risk of becoming septic again.  I think it is reasonable to continue IV ceftriaxone and PO flagyl until he undergoes surgery and then stop after source control is achieved.  Patient responded well to the current therapy.  Please try to set up home infusion with [facility] ([facility]    - Place midline (ok to place since 9/19 BCx contaminant and repeat BCx 9/21 ngtd)  - Discharge patient with ceftriaxone 1g IV q24h & Flagyl 500mg PO q8h  - Duration of antibiotics is until 10/3  - Weekly labs: CMP, CBC, ESR, CRP faxed to ID office at [phone]  - Patient to follow up with Dr. st in 2 weeks ([address] [place] [address], [phone]), ID office will call patient to schedule      Team 1 will follow you.  Case d/w primary team.    Adeline St MD, MS  Infectious Disease attending  work cell [phone]   For any questions during evening/weekend/holiday, please page ID on call
Covering for Dr. Zhang.    Frustrated about delay in home antibiotics. Otherwise no complaints.  AFVSS  Abd soft, ND NT.    A/P: Colovesical fistula with urinary tract infection.  1. IV antibiotics to be administered at home until operation next week  2. Regular diet.  3. d/c home

## 2022-09-26 NOTE — DISCHARGE NOTE PROVIDER - DETAILS OF MALNUTRITION DIAGNOSIS/DIAGNOSES
This patient has been assessed with a concern for Malnutrition and was treated during this hospitalization for the following Nutrition diagnosis/diagnoses:     -  09/22/2022: Moderate protein-calorie malnutrition

## 2022-09-26 NOTE — CHART NOTE - NSCHARTNOTEFT_GEN_A_CORE
Admitting Diagnosis:   Patient is a 67y old  Male who presents with a chief complaint of colovesical fistula (24 Sep 2022 11:17)      PAST MEDICAL & SURGICAL HISTORY:  Malignant neoplasm of hepatic flexure      Intestinal obstruction      Hypertension      High cholesterol      History of asthma      H/O left inguinal hernia repair      S/P right hemicolectomy          Current Nutrition Order:   Regular diet, Ensure Max Protein BID (300 kcal, 90 g protein, 900 ml free H2O)     PO Intake: Good (%) [ x  ]  Fair (50-75%) [   ] Poor (<25%) [   ]    GI Issues: Pt denies n/v/d/c. Last BM 9/26  No abd distention or discomfort    Pain: Denies pain    Skin Integrity: David 20  Colovesicular fistula  No PU noted  No edema noted    Labs:   09-26    137  |  98  |  7   ----------------------------<  86  4.2   |  29  |  0.72    Ca    9.6      26 Sep 2022 09:46  Phos  3.6     09-26  Mg     2.3     09-26      CAPILLARY BLOOD GLUCOSE          Medications:  MEDICATIONS  (STANDING):  cefTRIAXone   IVPB 1000 milliGRAM(s) IV Intermittent every 24 hours  chlorhexidine 2% Cloths 1 Application(s) Topical daily  heparin   Injectable 5000 Unit(s) SubCutaneous every 8 hours  influenza  Vaccine (HIGH DOSE) 0.7 milliLiter(s) IntraMuscular once  metroNIDAZOLE  IVPB 500 milliGRAM(s) IV Intermittent every 8 hours  pantoprazole    Tablet 40 milliGRAM(s) Oral before breakfast    MEDICATIONS  (PRN):  acetaminophen     Tablet .. 650 milliGRAM(s) Oral every 6 hours PRN Mild Pain (1 - 3)  ondansetron Injectable 4 milliGRAM(s) IV Push every 6 hours PRN Nausea    Adm Anthropometrics:  Height for BMI (FEET)	5 Feet  Height for BMI (INCHES)	8 Inch(s)  Height for BMI (CENTIMETERS)	172.72 Centimeter(s)  Weight for BMI (lbs)	170 lb  Weight for BMI (kg)	77.1 kg  Body Mass Index	25.8    Weight Change: No new wts in EMR. Please obtain new wts biweekly    Nutrition Focused Physical Exam: Completed [ x  ]  Not Pertinent [   ]  please see malnutrition chart note 9/22    Estimated energy needs:   lbs. %%. ABW used to calculate energy needs due to pt's current body weight within % IBW. Needs adjusted for age and wt, malignancy, malnutrition  0721-0022 kcals (25-30 kcals/kg, CBW)  92..94 g protein (1.2-1.4 g/kg, CBW)  3541-3537 mls (30-35 mls/kg, CBW)    Subjective:  68 yo M with PMH HTN, HLD, asthma, diverticulitis with colovescicular fistula and colon cancer s/p R hemicolectomy (2016) presenting with weakness and hypoglycemia. In the ED afebrile, VSS, WBC 18 Hb 9, glucose 142. CT with colovesicular fistula, thickened and chronically inflamed sigmoid, bladder distended and inflamed, consistent with cystitis, ascites in the pelvis. Patient will likely need surgery sooner than planned given chronic UTI/concern for impending urosepsis. Awaiting home infusion arrangements then to be d/x home on IV antibiotics. Plan is for elective surgery in few weeks.    Pt seen resting in bed. Observed breakfast tray by bedside table, >75% PO consumption. Encouraged to continue adequate PO and lean protein intake. Has Ensures Max protein shakes on table, pt reports that he will drink at home. Discussed continued importance to meet adequate PO and lean protein intake. Pt amenable, states he will continue to do so. RD to follow per protocol. Please see below for nutr recs.    Previous Nutrition Diagnosis:  Malnutrition...  moderate malnutrition in the context of chronic illness  RT PO likely <EER 2/2 hypermetabolic demands  AEB wt loss ~10% in 6 months, NFPE findings, observed mild muscle loss    Active [ x  ]  Resolved [   ]    Goal: Consistently meet >75% est needs during hospital stay w/o overt s/s of malnutrition    Recommendations:  1. Continue with regular diet. rec include Ensure Max Protein BID (300 kcal, 90 g protein, 900 ml free H2O)  2. Per previous conversation with team: If able to place PICC, TPN via central line: 315 g Dex, 107 g AA, 50g 20% Lipids to provide in total 2000 Kcal, 107 g protein, GIR 2.83, 1.4 g/kg IBW protein  Recommend checking TG before starting TPN and then check TG weekly. Check Mg, Phos, K daily and POC BG Q6hrs. Trend daily weights. Fluids and lytes per MD discretion. Start at 150g Dex on Day 1, 250g Dex on Day 2, and advance to goal of 315 g Dex on Day 3.  3. monitor BM and pain, regimen per team  4. Diet edu prn    Education: Reinforced and educated as previously reviewed    Risk Level: High [   ] Moderate [  x ] Low [   ] Admitting Diagnosis:   Patient is a 67y old  Male who presents with a chief complaint of colovesical fistula (24 Sep 2022 11:17)      PAST MEDICAL & SURGICAL HISTORY:  Malignant neoplasm of hepatic flexure      Intestinal obstruction      Hypertension      High cholesterol      History of asthma      H/O left inguinal hernia repair      S/P right hemicolectomy          Current Nutrition Order:   Regular diet, Ensure Max Protein BID (300 kcal, 90 g protein, 900 ml free H2O)     PO Intake: Good (%) [ x  ]  Fair (50-75%) [   ] Poor (<25%) [   ]    GI Issues: Pt denies n/v/d/c. Last BM 9/26  No abd distention or discomfort    Pain: Denies pain    Skin Integrity: David 20  Colovesicular fistula  No PU noted  No edema noted    Labs:   09-26    137  |  98  |  7   ----------------------------<  86  4.2   |  29  |  0.72    Ca    9.6      26 Sep 2022 09:46  Phos  3.6     09-26  Mg     2.3     09-26      CAPILLARY BLOOD GLUCOSE          Medications:  MEDICATIONS  (STANDING):  cefTRIAXone   IVPB 1000 milliGRAM(s) IV Intermittent every 24 hours  chlorhexidine 2% Cloths 1 Application(s) Topical daily  heparin   Injectable 5000 Unit(s) SubCutaneous every 8 hours  influenza  Vaccine (HIGH DOSE) 0.7 milliLiter(s) IntraMuscular once  metroNIDAZOLE  IVPB 500 milliGRAM(s) IV Intermittent every 8 hours  pantoprazole    Tablet 40 milliGRAM(s) Oral before breakfast    MEDICATIONS  (PRN):  acetaminophen     Tablet .. 650 milliGRAM(s) Oral every 6 hours PRN Mild Pain (1 - 3)  ondansetron Injectable 4 milliGRAM(s) IV Push every 6 hours PRN Nausea    Adm Anthropometrics:  Height for BMI (FEET)	5 Feet  Height for BMI (INCHES)	8 Inch(s)  Height for BMI (CENTIMETERS)	172.72 Centimeter(s)  Weight for BMI (lbs)	170 lb  Weight for BMI (kg)	77.1 kg  Body Mass Index	25.8    Weight Change: No new wts in EMR. Please obtain new wts biweekly    Nutrition Focused Physical Exam: Completed [ x  ]  Not Pertinent [   ]  please see malnutrition chart note 9/22    Estimated energy needs:   lbs. %%. ABW used to calculate energy needs due to pt's current body weight within % IBW. Needs adjusted for age and wt, malignancy, malnutrition  0572-0044 kcals (25-30 kcals/kg, CBW)  92..94 g protein (1.2-1.4 g/kg, CBW)  4191-3432 mls (30-35 mls/kg, CBW)    Subjective:  66 yo M with PMH HTN, HLD, asthma, diverticulitis with colovescicular fistula and colon cancer s/p R hemicolectomy (2016) presenting with weakness and hypoglycemia. In the ED afebrile, VSS, WBC 18 Hb 9, glucose 142. CT with colovesicular fistula, thickened and chronically inflamed sigmoid, bladder distended and inflamed, consistent with cystitis, ascites in the pelvis. Patient will likely need surgery sooner than planned given chronic UTI/concern for impending urosepsis. Awaiting home infusion arrangements then to be d/x home on IV antibiotics. Plan is for elective surgery in few weeks.    Pt seen resting in bed. Observed breakfast tray by bedside table, >75% PO consumption. Encouraged to continue adequate PO and lean protein intake. Has Ensures Max protein shakes on table, pt reports that he will drink at home. Discussed continued importance to meet adequate PO and lean protein intake. Pt amenable, states he will continue to do so. RD to follow per protocol. Please see below for nutr recs.    Previous Nutrition Diagnosis:  Malnutrition...  moderate malnutrition in the context of chronic illness  RT PO likely <EER 2/2 hypermetabolic demands  AEB wt loss ~10% in 6 months, NFPE findings, observed mild muscle loss    Active [ x  ]  Resolved [   ]    Goal: Consistently meet >75% est needs during hospital stay w/o overt s/s of malnutrition    Recommendations:  1. Continue with regular diet and Ensure Max Protein BID (300 kcal, 90 g protein, 900 ml free H2O)  2. Per previous conversation with team: If able to place PICC, TPN via central line: 315 g Dex, 107 g AA, 50g 20% Lipids to provide in total 2000 Kcal, 107 g protein, GIR 2.83, 1.4 g/kg IBW protein  Recommend checking TG before starting TPN and then check TG weekly. Check Mg, Phos, K daily and POC BG Q6hrs. Trend daily weights. Fluids and lytes per MD discretion. Start at 150g Dex on Day 1, 250g Dex on Day 2, and advance to goal of 315 g Dex on Day 3.  3. monitor BM and pain, regimen per team  4. Diet edu prn    Education: Reinforced and educated as previously reviewed    Risk Level: High [   ] Moderate [  x ] Low [   ] Admitting Diagnosis:   Patient is a 67y old  Male who presents with a chief complaint of colovesical fistula (24 Sep 2022 11:17)      PAST MEDICAL & SURGICAL HISTORY:  Malignant neoplasm of hepatic flexure      Intestinal obstruction      Hypertension      High cholesterol      History of asthma      H/O left inguinal hernia repair      S/P right hemicolectomy          Current Nutrition Order:   Regular diet, Ensure Max Protein BID (300 kcal, 90 g protein, 900 ml free H2O)     PO Intake: Good (%) [ x  ]  Fair (50-75%) [   ] Poor (<25%) [   ]    GI Issues: Pt denies n/v/d/c. Last BM 9/26  No abd distention or discomfort    Pain: Denies pain    Skin Integrity: David 20  Colovesicular fistula  No PU noted  No edema noted    Labs:   09-26    137  |  98  |  7   ----------------------------<  86  4.2   |  29  |  0.72    Ca    9.6      26 Sep 2022 09:46  Phos  3.6     09-26  Mg     2.3     09-26      CAPILLARY BLOOD GLUCOSE          Medications:  MEDICATIONS  (STANDING):  cefTRIAXone   IVPB 1000 milliGRAM(s) IV Intermittent every 24 hours  chlorhexidine 2% Cloths 1 Application(s) Topical daily  heparin   Injectable 5000 Unit(s) SubCutaneous every 8 hours  influenza  Vaccine (HIGH DOSE) 0.7 milliLiter(s) IntraMuscular once  metroNIDAZOLE  IVPB 500 milliGRAM(s) IV Intermittent every 8 hours  pantoprazole    Tablet 40 milliGRAM(s) Oral before breakfast    MEDICATIONS  (PRN):  acetaminophen     Tablet .. 650 milliGRAM(s) Oral every 6 hours PRN Mild Pain (1 - 3)  ondansetron Injectable 4 milliGRAM(s) IV Push every 6 hours PRN Nausea    Adm Anthropometrics:  Height for BMI (FEET)	5 Feet  Height for BMI (INCHES)	8 Inch(s)  Height for BMI (CENTIMETERS)	172.72 Centimeter(s)  Weight for BMI (lbs)	170 lb  Weight for BMI (kg)	77.1 kg  Body Mass Index	25.8    Weight Change: No new wts in EMR. Please obtain new wts biweekly    Nutrition Focused Physical Exam: Completed [ x  ]  Not Pertinent [   ]  please see malnutrition chart note 9/22    Estimated energy needs:   lbs. %%. ABW used to calculate energy needs due to pt's current body weight within % IBW. Needs adjusted for age and wt, malignancy, malnutrition  5240-4114 kcals (25-30 kcals/kg, CBW)  92..94 g protein (1.2-1.4 g/kg, CBW)  2492-5190 mls (30-35 mls/kg, CBW)    Subjective:  66 yo M with PMH HTN, HLD, asthma, diverticulitis with colovescicular fistula and colon cancer s/p R hemicolectomy (2016) presenting with weakness and hypoglycemia. In the ED afebrile, VSS, WBC 18 Hb 9, glucose 142. CT with colovesicular fistula, thickened and chronically inflamed sigmoid, bladder distended and inflamed, consistent with cystitis, ascites in the pelvis. Patient will likely need surgery sooner than planned given chronic UTI/concern for impending urosepsis. Awaiting home infusion arrangements then to be d/x home on IV antibiotics. Plan is for elective surgery in few weeks.    Pt seen resting in bed. Observed breakfast tray by bedside table, >75% PO consumption. Encouraged to continue adequate PO and lean protein intake. Has Ensures Max protein shakes on table, pt reports that he will drink at home. Discussed continued importance to meet adequate PO and lean protein intake. Pt amenable, states he will continue to do so. Per previous RD note, team deciding whether if able to start TPN for nutrition optimization. Paged team regarding updates. Recs provided below. RD to follow per protocol. Please see below for nutr recs.    Previous Nutrition Diagnosis:  Malnutrition...  moderate malnutrition in the context of chronic illness  RT PO likely <EER 2/2 hypermetabolic demands  AEB wt loss ~10% in 6 months, NFPE findings, observed mild muscle loss    Active [ x  ]  Resolved [   ]    Goal: Consistently meet >75% est needs during hospital stay w/o overt s/s of malnutrition    Recommendations:  1. Continue with regular diet and Ensure Max Protein BID (300 kcal, 90 g protein, 900 ml free H2O)  2. Per previous conversation with team: If able to place PICC, TPN via central line: 315 g Dex, 107 g AA, 50g 20% Lipids to provide in total 2000 Kcal, 107 g protein, GIR 2.83, 1.4 g/kg IBW protein  Recommend checking TG before starting TPN and then check TG weekly. Check Mg, Phos, K daily and POC BG Q6hrs. Trend daily weights. Fluids and lytes per MD discretion. Start at 150g Dex on Day 1, 250g Dex on Day 2, and advance to goal of 315 g Dex on Day 3.  3. monitor BM and pain, regimen per team  4. Diet edu prn  **Paged team    Education: Reinforced and educated as previously reviewed    Risk Level: High [   ] Moderate [  x ] Low [   ] Admitting Diagnosis:   Patient is a 67y old  Male who presents with a chief complaint of colovesical fistula (24 Sep 2022 11:17)      PAST MEDICAL & SURGICAL HISTORY:  Malignant neoplasm of hepatic flexure      Intestinal obstruction      Hypertension      High cholesterol      History of asthma      H/O left inguinal hernia repair      S/P right hemicolectomy          Current Nutrition Order:   Regular diet, Ensure Max Protein BID (300 kcal, 90 g protein, 900 ml free H2O)     PO Intake: Good (%) [ x  ]  Fair (50-75%) [   ] Poor (<25%) [   ]    GI Issues: Pt denies n/v/d/c. Last BM 9/26  No abd distention or discomfort    Pain: Denies pain    Skin Integrity: David 20  Colovesicular fistula  No PU noted  No edema noted    Labs:   09-26    137  |  98  |  7   ----------------------------<  86  4.2   |  29  |  0.72    Ca    9.6      26 Sep 2022 09:46  Phos  3.6     09-26  Mg     2.3     09-26      CAPILLARY BLOOD GLUCOSE          Medications:  MEDICATIONS  (STANDING):  cefTRIAXone   IVPB 1000 milliGRAM(s) IV Intermittent every 24 hours  chlorhexidine 2% Cloths 1 Application(s) Topical daily  heparin   Injectable 5000 Unit(s) SubCutaneous every 8 hours  influenza  Vaccine (HIGH DOSE) 0.7 milliLiter(s) IntraMuscular once  metroNIDAZOLE  IVPB 500 milliGRAM(s) IV Intermittent every 8 hours  pantoprazole    Tablet 40 milliGRAM(s) Oral before breakfast    MEDICATIONS  (PRN):  acetaminophen     Tablet .. 650 milliGRAM(s) Oral every 6 hours PRN Mild Pain (1 - 3)  ondansetron Injectable 4 milliGRAM(s) IV Push every 6 hours PRN Nausea    Adm Anthropometrics:  Height for BMI (FEET)	5 Feet  Height for BMI (INCHES)	8 Inch(s)  Height for BMI (CENTIMETERS)	172.72 Centimeter(s)  Weight for BMI (lbs)	170 lb  Weight for BMI (kg)	77.1 kg  Body Mass Index	25.8    Weight Change: No new wts in EMR. Please obtain new wts biweekly    Nutrition Focused Physical Exam: Completed [ x  ]  Not Pertinent [   ]  please see malnutrition chart note 9/22    Estimated energy needs:   lbs. %%. ABW used to calculate energy needs due to pt's current body weight within % IBW. Needs adjusted for age and wt, malignancy, malnutrition  0630-9657 kcals (25-30 kcals/kg, CBW)  92..94 g protein (1.2-1.4 g/kg, CBW)  1851-5681 mls (30-35 mls/kg, CBW)    Subjective:  68 yo M with PMH HTN, HLD, asthma, diverticulitis with colovescicular fistula and colon cancer s/p R hemicolectomy (2016) presenting with weakness and hypoglycemia. In the ED afebrile, VSS, WBC 18 Hb 9, glucose 142. CT with colovesicular fistula, thickened and chronically inflamed sigmoid, bladder distended and inflamed, consistent with cystitis, ascites in the pelvis. Patient will likely need surgery sooner than planned given chronic UTI/concern for impending urosepsis. Awaiting home infusion arrangements then to be d/x home on IV antibiotics. Plan is for elective surgery in few weeks.    Pt seen resting in bed. Observed breakfast tray by bedside table, >75% PO consumption. Encouraged to continue adequate PO and lean protein intake. Has Ensures Max protein shakes on table, pt reports that he will drink at home. Discussed continued importance to meet adequate PO and lean protein intake. Pt amenable, states he will continue to do so. RD to follow per protocol. Please see below for nutr recs.    Previous Nutrition Diagnosis:  Malnutrition...  moderate malnutrition in the context of chronic illness  RT PO likely <EER 2/2 hypermetabolic demands  AEB wt loss ~10% in 6 months, NFPE findings, observed mild muscle loss    Active [ x  ]  Resolved [   ]    Goal: Consistently meet >75% est needs during hospital stay w/o overt s/s of malnutrition    Recommendations:  1. Continue with regular diet and Ensure Max Protein BID (300 kcal, 90 g protein, 900 ml free H2O)  2. Continue to monitor PO  3. monitor BM and pain, regimen per team  4. Diet edu prn  **Paged team    Education: Reinforced and educated as previously reviewed    Risk Level: High [   ] Moderate [  x ] Low [   ]

## 2022-09-29 ENCOUNTER — NON-APPOINTMENT (OUTPATIENT)
Age: 67
End: 2022-09-29

## 2022-10-03 ENCOUNTER — OUTPATIENT (OUTPATIENT)
Dept: OUTPATIENT SERVICES | Facility: HOSPITAL | Age: 67
LOS: 1 days | End: 2022-10-03
Payer: COMMERCIAL

## 2022-10-03 ENCOUNTER — RESULT REVIEW (OUTPATIENT)
Age: 67
End: 2022-10-03

## 2022-10-03 DIAGNOSIS — Z90.49 ACQUIRED ABSENCE OF OTHER SPECIFIED PARTS OF DIGESTIVE TRACT: Chronic | ICD-10-CM

## 2022-10-03 DIAGNOSIS — N32.1 VESICOINTESTINAL FISTULA: ICD-10-CM

## 2022-10-03 DIAGNOSIS — Z98.89 OTHER SPECIFIED POSTPROCEDURAL STATES: Chronic | ICD-10-CM

## 2022-10-03 PROCEDURE — 88321 CONSLTJ&REPRT SLD PREP ELSWR: CPT

## 2022-10-04 LAB — SURGICAL PATHOLOGY STUDY: SIGNIFICANT CHANGE UP

## 2022-10-05 ENCOUNTER — TRANSCRIPTION ENCOUNTER (OUTPATIENT)
Age: 67
End: 2022-10-05

## 2022-10-05 VITALS
HEART RATE: 79 BPM | RESPIRATION RATE: 16 BRPM | TEMPERATURE: 97 F | HEIGHT: 68 IN | WEIGHT: 153.22 LBS | SYSTOLIC BLOOD PRESSURE: 107 MMHG | DIASTOLIC BLOOD PRESSURE: 67 MMHG | OXYGEN SATURATION: 100 %

## 2022-10-05 DIAGNOSIS — A41.9 SEPSIS, UNSPECIFIED ORGANISM: ICD-10-CM

## 2022-10-05 DIAGNOSIS — N32.1 VESICOINTESTINAL FISTULA: ICD-10-CM

## 2022-10-05 DIAGNOSIS — K57.32 DIVERTICULITIS OF LARGE INTESTINE WITHOUT PERFORATION OR ABSCESS WITHOUT BLEEDING: ICD-10-CM

## 2022-10-05 DIAGNOSIS — Z90.49 ACQUIRED ABSENCE OF OTHER SPECIFIED PARTS OF DIGESTIVE TRACT: ICD-10-CM

## 2022-10-05 DIAGNOSIS — E87.5 HYPERKALEMIA: ICD-10-CM

## 2022-10-05 DIAGNOSIS — N39.0 URINARY TRACT INFECTION, SITE NOT SPECIFIED: ICD-10-CM

## 2022-10-05 DIAGNOSIS — J45.909 UNSPECIFIED ASTHMA, UNCOMPLICATED: ICD-10-CM

## 2022-10-05 DIAGNOSIS — I10 ESSENTIAL (PRIMARY) HYPERTENSION: ICD-10-CM

## 2022-10-05 DIAGNOSIS — E44.0 MODERATE PROTEIN-CALORIE MALNUTRITION: ICD-10-CM

## 2022-10-05 DIAGNOSIS — E16.2 HYPOGLYCEMIA, UNSPECIFIED: ICD-10-CM

## 2022-10-05 DIAGNOSIS — R18.8 OTHER ASCITES: ICD-10-CM

## 2022-10-05 DIAGNOSIS — R16.1 SPLENOMEGALY, NOT ELSEWHERE CLASSIFIED: ICD-10-CM

## 2022-10-05 NOTE — PATIENT PROFILE ADULT - PUBLIC BENEFITS
Pt mother calling pt is in [place] and feeling well besides still having some numbness and tingling on the right side of his face this is only happening when he exercises.  No other numbness or tingling anywhere else. No pain.    Mother just asking for PCP opinion on what this may be ?  Please advise.     no

## 2022-10-05 NOTE — PRE-OP CHECKLIST - SELECT TESTS ORDERED
BMP/CBC/PT/PTT/INR/EKG BMP/CBC/PT/PTT/INR/EKG/COVID-19 FS 91/BMP/CBC/PT/PTT/INR/EKG/POCT Blood Glucose/COVID-19

## 2022-10-05 NOTE — PATIENT PROFILE ADULT - FALL HARM RISK - UNIVERSAL INTERVENTIONS
Bed in lowest position, wheels locked, appropriate side rails in place/Call bell, personal items and telephone in reach/Instruct patient to call for assistance before getting out of bed or chair/Non-slip footwear when patient is out of bed/[place]ita to call system/Physically safe environment - no spills, clutter or unnecessary equipment/Purposeful Proactive Rounding/Room/bathroom lighting operational, light cord in reach

## 2022-10-05 NOTE — PRE-OP CHECKLIST - BSA (M2)
Patient: Francoise Valerio    Procedure: Procedure(s):  ROBOTIC ASSISTED LAPAROSCOPIC RADICAL PROSTATECTOMY WITH BILATERAL PELVIC LYMPH NODE DISSECTION       Diagnosis:Prostate cancer (H) [C61]  Diagnosis Additional Information: No value filed.    Anesthesia Type:  General    Note:     Postop Pain Control: Uneventful            Sign Out: Well controlled pain   PONV: No   Neuro/Psych: Uneventful            Sign Out: Acceptable/Baseline neuro status   Airway/Respiratory: Uneventful            Sign Out: Acceptable/Baseline resp. status   CV/Hemodynamics: Uneventful            Sign Out: Acceptable CV status   Other NRE: NONE   DID A NON-ROUTINE EVENT OCCUR? No           Last vitals:  Vitals Value Taken Time   /89 11/22/21 1930   Temp 37  C (98.6  F) 11/22/21 1810   Pulse 66 11/22/21 1932   Resp 13 11/22/21 1845   SpO2 100 % 11/22/21 1932   Vitals shown include unvalidated device data.    Electronically Signed By: Coty Prieto MD  November 22, 2021  7:32 PM  
1.83

## 2022-10-06 ENCOUNTER — RESULT REVIEW (OUTPATIENT)
Age: 67
End: 2022-10-06

## 2022-10-06 ENCOUNTER — TRANSCRIPTION ENCOUNTER (OUTPATIENT)
Age: 67
End: 2022-10-06

## 2022-10-06 ENCOUNTER — INPATIENT (INPATIENT)
Facility: HOSPITAL | Age: 67
LOS: 7 days | Discharge: HOME HEALTH | DRG: 653 | End: 2022-10-14
Attending: COLON & RECTAL SURGERY | Admitting: COLON & RECTAL SURGERY
Payer: COMMERCIAL

## 2022-10-06 DIAGNOSIS — Z98.89 OTHER SPECIFIED POSTPROCEDURAL STATES: Chronic | ICD-10-CM

## 2022-10-06 DIAGNOSIS — Z90.49 ACQUIRED ABSENCE OF OTHER SPECIFIED PARTS OF DIGESTIVE TRACT: Chronic | ICD-10-CM

## 2022-10-06 LAB
ANION GAP SERPL CALC-SCNC: 9 MMOL/L — SIGNIFICANT CHANGE UP (ref 5–17)
BASE EXCESS BLDA CALC-SCNC: -0.6 MMOL/L — SIGNIFICANT CHANGE UP (ref -2–3)
BASE EXCESS BLDA CALC-SCNC: -1.3 MMOL/L — SIGNIFICANT CHANGE UP (ref -2–3)
BASE EXCESS BLDA CALC-SCNC: -3.8 MMOL/L — LOW (ref -2–3)
BASE EXCESS BLDA CALC-SCNC: -4.4 MMOL/L — LOW (ref -2–3)
BLD GP AB SCN SERPL QL: NEGATIVE — SIGNIFICANT CHANGE UP
BUN SERPL-MCNC: 8 MG/DL — SIGNIFICANT CHANGE UP (ref 7–23)
CA-I BLDA-SCNC: 1.22 MMOL/L — SIGNIFICANT CHANGE UP (ref 1.15–1.33)
CA-I BLDA-SCNC: 1.22 MMOL/L — SIGNIFICANT CHANGE UP (ref 1.15–1.33)
CA-I BLDA-SCNC: 1.27 MMOL/L — SIGNIFICANT CHANGE UP (ref 1.15–1.33)
CA-I BLDA-SCNC: 1.32 MMOL/L — SIGNIFICANT CHANGE UP (ref 1.15–1.33)
CALCIUM SERPL-MCNC: 6.3 MG/DL — CRITICAL LOW (ref 8.4–10.5)
CHLORIDE SERPL-SCNC: 117 MMOL/L — HIGH (ref 96–108)
CO2 BLDA-SCNC: 23 MMOL/L — SIGNIFICANT CHANGE UP (ref 19–24)
CO2 BLDA-SCNC: 25 MMOL/L — HIGH (ref 19–24)
CO2 BLDA-SCNC: 26 MMOL/L — HIGH (ref 19–24)
CO2 BLDA-SCNC: 27 MMOL/L — HIGH (ref 19–24)
CO2 SERPL-SCNC: 18 MMOL/L — LOW (ref 22–31)
COHGB MFR BLDA: 0.9 % — SIGNIFICANT CHANGE UP
COHGB MFR BLDA: 1.2 % — SIGNIFICANT CHANGE UP
COHGB MFR BLDA: 1.5 % — SIGNIFICANT CHANGE UP
COHGB MFR BLDA: 1.7 % — SIGNIFICANT CHANGE UP
CREAT SERPL-MCNC: 0.59 MG/DL — SIGNIFICANT CHANGE UP (ref 0.5–1.3)
EGFR: 106 ML/MIN/1.73M2 — SIGNIFICANT CHANGE UP
GLUCOSE BLDA-MCNC: 109 MG/DL — HIGH (ref 70–99)
GLUCOSE BLDA-MCNC: 149 MG/DL — HIGH (ref 70–99)
GLUCOSE BLDA-MCNC: 152 MG/DL — HIGH (ref 70–99)
GLUCOSE BLDA-MCNC: 154 MG/DL — HIGH (ref 70–99)
GLUCOSE BLDC GLUCOMTR-MCNC: 91 MG/DL — SIGNIFICANT CHANGE UP (ref 70–99)
GLUCOSE SERPL-MCNC: 124 MG/DL — HIGH (ref 70–99)
HCO3 BLDA-SCNC: 22 MMOL/L — SIGNIFICANT CHANGE UP (ref 21–28)
HCO3 BLDA-SCNC: 24 MMOL/L — SIGNIFICANT CHANGE UP (ref 21–28)
HCO3 BLDA-SCNC: 24 MMOL/L — SIGNIFICANT CHANGE UP (ref 21–28)
HCO3 BLDA-SCNC: 26 MMOL/L — SIGNIFICANT CHANGE UP (ref 21–28)
HCT VFR BLD CALC: 28.4 % — LOW (ref 39–50)
HGB BLD-MCNC: 8.9 G/DL — LOW (ref 13–17)
HGB BLDA-MCNC: 10 G/DL — LOW (ref 12.6–17.4)
HGB BLDA-MCNC: 8.6 G/DL — LOW (ref 12.6–17.4)
HGB BLDA-MCNC: 9.3 G/DL — LOW (ref 12.6–17.4)
HGB BLDA-MCNC: 9.7 G/DL — LOW (ref 12.6–17.4)
MAGNESIUM SERPL-MCNC: 1 MG/DL — CRITICAL LOW (ref 1.6–2.6)
MCHC RBC-ENTMCNC: 24.9 PG — LOW (ref 27–34)
MCHC RBC-ENTMCNC: 31.3 GM/DL — LOW (ref 32–36)
MCV RBC AUTO: 79.6 FL — LOW (ref 80–100)
METHGB MFR BLDA: 0 % — SIGNIFICANT CHANGE UP
NRBC # BLD: 0 /100 WBCS — SIGNIFICANT CHANGE UP (ref 0–0)
OXYHGB MFR BLDA: 94.4 % — SIGNIFICANT CHANGE UP (ref 90–95)
OXYHGB MFR BLDA: 95.1 % — HIGH (ref 90–95)
OXYHGB MFR BLDA: 95.5 % — HIGH (ref 90–95)
OXYHGB MFR BLDA: 95.8 % — HIGH (ref 90–95)
PCO2 BLDA: 43 MMHG — SIGNIFICANT CHANGE UP (ref 35–48)
PCO2 BLDA: 44 MMHG — SIGNIFICANT CHANGE UP (ref 35–48)
PCO2 BLDA: 50 MMHG — HIGH (ref 35–48)
PCO2 BLDA: 54 MMHG — HIGH (ref 35–48)
PH BLDA: 7.25 — LOW (ref 7.35–7.45)
PH BLDA: 7.31 — LOW (ref 7.35–7.45)
PH BLDA: 7.32 — LOW (ref 7.35–7.45)
PH BLDA: 7.35 — SIGNIFICANT CHANGE UP (ref 7.35–7.45)
PHOSPHATE SERPL-MCNC: 2.7 MG/DL — SIGNIFICANT CHANGE UP (ref 2.5–4.5)
PLATELET # BLD AUTO: 248 K/UL — SIGNIFICANT CHANGE UP (ref 150–400)
PO2 BLDA: 161 MMHG — HIGH (ref 83–108)
PO2 BLDA: 167 MMHG — HIGH (ref 83–108)
PO2 BLDA: 204 MMHG — HIGH (ref 83–108)
PO2 BLDA: 211 MMHG — HIGH (ref 83–108)
POTASSIUM BLDA-SCNC: 3.8 MMOL/L — SIGNIFICANT CHANGE UP (ref 3.5–5.1)
POTASSIUM BLDA-SCNC: 3.9 MMOL/L — SIGNIFICANT CHANGE UP (ref 3.5–5.1)
POTASSIUM BLDA-SCNC: 4 MMOL/L — SIGNIFICANT CHANGE UP (ref 3.5–5.1)
POTASSIUM BLDA-SCNC: 4.1 MMOL/L — SIGNIFICANT CHANGE UP (ref 3.5–5.1)
POTASSIUM SERPL-MCNC: 3.2 MMOL/L — LOW (ref 3.5–5.3)
POTASSIUM SERPL-SCNC: 3.2 MMOL/L — LOW (ref 3.5–5.3)
RBC # BLD: 3.57 M/UL — LOW (ref 4.2–5.8)
RBC # FLD: 18.9 % — HIGH (ref 10.3–14.5)
RH IG SCN BLD-IMP: POSITIVE — SIGNIFICANT CHANGE UP
SAO2 % BLDA: 95.9 % — SIGNIFICANT CHANGE UP (ref 94–98)
SAO2 % BLDA: 96 % — SIGNIFICANT CHANGE UP (ref 94–98)
SAO2 % BLDA: 96.7 % — SIGNIFICANT CHANGE UP (ref 94–98)
SAO2 % BLDA: 97.3 % — SIGNIFICANT CHANGE UP (ref 94–98)
SODIUM BLDA-SCNC: 133 MMOL/L — LOW (ref 136–145)
SODIUM BLDA-SCNC: 135 MMOL/L — LOW (ref 136–145)
SODIUM SERPL-SCNC: 144 MMOL/L — SIGNIFICANT CHANGE UP (ref 135–145)
WBC # BLD: 7.9 K/UL — SIGNIFICANT CHANGE UP (ref 3.8–10.5)
WBC # FLD AUTO: 7.9 K/UL — SIGNIFICANT CHANGE UP (ref 3.8–10.5)

## 2022-10-06 PROCEDURE — 44661 REPAIR BOWEL-BLADDER FISTULA: CPT

## 2022-10-06 PROCEDURE — 88309 TISSUE EXAM BY PATHOLOGIST: CPT | Mod: 26

## 2022-10-06 PROCEDURE — 51050 REMOVAL OF BLADDER STONE: CPT

## 2022-10-06 PROCEDURE — 52332 CYSTOSCOPY AND TREATMENT: CPT | Mod: 50

## 2022-10-06 PROCEDURE — 88304 TISSUE EXAM BY PATHOLOGIST: CPT | Mod: 26

## 2022-10-06 PROCEDURE — 88307 TISSUE EXAM BY PATHOLOGIST: CPT | Mod: 26

## 2022-10-06 DEVICE — STAPLER ETHICON PROXIMATE RELOAD 75MM BLUE: Type: IMPLANTABLE DEVICE | Status: FUNCTIONAL

## 2022-10-06 DEVICE — STAPLER ETHICON PROXIMATE 75MM WITH BLUE RELOAD: Type: IMPLANTABLE DEVICE | Status: FUNCTIONAL

## 2022-10-06 DEVICE — IMPLANTABLE DEVICE: Type: IMPLANTABLE DEVICE | Status: FUNCTIONAL

## 2022-10-06 DEVICE — STAPLER ETHICON CIRCULAR XL 29MM: Type: IMPLANTABLE DEVICE | Status: FUNCTIONAL

## 2022-10-06 DEVICE — STAPLER COVIDIEN TA 60 BLUE: Type: IMPLANTABLE DEVICE | Status: FUNCTIONAL

## 2022-10-06 RX ORDER — METRONIDAZOLE 500 MG
500 TABLET ORAL EVERY 8 HOURS
Refills: 0 | Status: DISCONTINUED | OUTPATIENT
Start: 2022-10-06 | End: 2022-10-12

## 2022-10-06 RX ORDER — SODIUM CHLORIDE 9 MG/ML
1000 INJECTION, SOLUTION INTRAVENOUS
Refills: 0 | Status: DISCONTINUED | OUTPATIENT
Start: 2022-10-06 | End: 2022-10-09

## 2022-10-06 RX ORDER — CEFTRIAXONE 500 MG/1
1000 INJECTION, POWDER, FOR SOLUTION INTRAMUSCULAR; INTRAVENOUS EVERY 24 HOURS
Refills: 0 | Status: DISCONTINUED | OUTPATIENT
Start: 2022-10-06 | End: 2022-10-06

## 2022-10-06 RX ORDER — ACETAMINOPHEN 500 MG
1000 TABLET ORAL ONCE
Refills: 0 | Status: COMPLETED | OUTPATIENT
Start: 2022-10-06 | End: 2022-10-06

## 2022-10-06 RX ORDER — HYDROMORPHONE HYDROCHLORIDE 2 MG/ML
0.25 INJECTION INTRAMUSCULAR; INTRAVENOUS; SUBCUTANEOUS EVERY 6 HOURS
Refills: 0 | Status: DISCONTINUED | OUTPATIENT
Start: 2022-10-06 | End: 2022-10-11

## 2022-10-06 RX ORDER — METRONIDAZOLE 500 MG
500 TABLET ORAL EVERY 8 HOURS
Refills: 0 | Status: DISCONTINUED | OUTPATIENT
Start: 2022-10-06 | End: 2022-10-06

## 2022-10-06 RX ORDER — METRONIDAZOLE 500 MG
TABLET ORAL
Refills: 0 | Status: DISCONTINUED | OUTPATIENT
Start: 2022-10-06 | End: 2022-10-06

## 2022-10-06 RX ORDER — HEPARIN SODIUM 5000 [USP'U]/ML
5000 INJECTION INTRAVENOUS; SUBCUTANEOUS ONCE
Refills: 0 | Status: DISCONTINUED | OUTPATIENT
Start: 2022-10-06 | End: 2022-10-06

## 2022-10-06 RX ORDER — SODIUM CHLORIDE 9 MG/ML
1000 INJECTION, SOLUTION INTRAVENOUS
Refills: 0 | Status: DISCONTINUED | OUTPATIENT
Start: 2022-10-06 | End: 2022-10-06

## 2022-10-06 RX ORDER — MAGNESIUM SULFATE 500 MG/ML
2 VIAL (ML) INJECTION EVERY 6 HOURS
Refills: 0 | Status: COMPLETED | OUTPATIENT
Start: 2022-10-06 | End: 2022-10-07

## 2022-10-06 RX ORDER — HYDROMORPHONE HYDROCHLORIDE 2 MG/ML
0.5 INJECTION INTRAMUSCULAR; INTRAVENOUS; SUBCUTANEOUS EVERY 6 HOURS
Refills: 0 | Status: DISCONTINUED | OUTPATIENT
Start: 2022-10-06 | End: 2022-10-11

## 2022-10-06 RX ORDER — ONDANSETRON 8 MG/1
4 TABLET, FILM COATED ORAL EVERY 6 HOURS
Refills: 0 | Status: DISCONTINUED | OUTPATIENT
Start: 2022-10-06 | End: 2022-10-14

## 2022-10-06 RX ORDER — SODIUM CHLORIDE 9 MG/ML
1000 INJECTION INTRAMUSCULAR; INTRAVENOUS; SUBCUTANEOUS
Refills: 0 | Status: DISCONTINUED | OUTPATIENT
Start: 2022-10-06 | End: 2022-10-06

## 2022-10-06 RX ORDER — ACETAMINOPHEN 500 MG
1000 TABLET ORAL ONCE
Refills: 0 | Status: COMPLETED | OUTPATIENT
Start: 2022-10-06 | End: 2022-10-07

## 2022-10-06 RX ORDER — HEPARIN SODIUM 5000 [USP'U]/ML
5000 INJECTION INTRAVENOUS; SUBCUTANEOUS EVERY 8 HOURS
Refills: 0 | Status: DISCONTINUED | OUTPATIENT
Start: 2022-10-06 | End: 2022-10-14

## 2022-10-06 RX ORDER — POTASSIUM CHLORIDE 20 MEQ
10 PACKET (EA) ORAL
Refills: 0 | Status: COMPLETED | OUTPATIENT
Start: 2022-10-06 | End: 2022-10-06

## 2022-10-06 RX ORDER — IPRATROPIUM/ALBUTEROL SULFATE 18-103MCG
3 AEROSOL WITH ADAPTER (GRAM) INHALATION EVERY 6 HOURS
Refills: 0 | Status: DISCONTINUED | OUTPATIENT
Start: 2022-10-06 | End: 2022-10-14

## 2022-10-06 RX ORDER — METRONIDAZOLE 500 MG
500 TABLET ORAL ONCE
Refills: 0 | Status: DISCONTINUED | OUTPATIENT
Start: 2022-10-06 | End: 2022-10-06

## 2022-10-06 RX ORDER — CEFTRIAXONE 500 MG/1
1000 INJECTION, POWDER, FOR SOLUTION INTRAMUSCULAR; INTRAVENOUS EVERY 24 HOURS
Refills: 0 | Status: DISCONTINUED | OUTPATIENT
Start: 2022-10-07 | End: 2022-10-12

## 2022-10-06 RX ORDER — HEPARIN SODIUM 5000 [USP'U]/ML
5000 INJECTION INTRAVENOUS; SUBCUTANEOUS ONCE
Refills: 0 | Status: COMPLETED | OUTPATIENT
Start: 2022-10-06 | End: 2022-10-06

## 2022-10-06 RX ADMIN — Medication 100 MILLIEQUIVALENT(S): at 22:28

## 2022-10-06 RX ADMIN — Medication 1000 MILLIGRAM(S): at 09:41

## 2022-10-06 RX ADMIN — HEPARIN SODIUM 5000 UNIT(S): 5000 INJECTION INTRAVENOUS; SUBCUTANEOUS at 09:41

## 2022-10-06 RX ADMIN — Medication 50 GRAM(S): at 20:44

## 2022-10-06 RX ADMIN — HEPARIN SODIUM 5000 UNIT(S): 5000 INJECTION INTRAVENOUS; SUBCUTANEOUS at 23:21

## 2022-10-06 RX ADMIN — SODIUM CHLORIDE 110 MILLILITER(S): 9 INJECTION INTRAMUSCULAR; INTRAVENOUS; SUBCUTANEOUS at 20:40

## 2022-10-06 RX ADMIN — Medication 100 MILLIGRAM(S): at 22:01

## 2022-10-06 NOTE — H&P ADULT - NSHPPHYSICALEXAM_GEN_ALL_CORE
T(C): --  HR: --  BP: --  RR: --  SpO2: --    GENERAL: NAD, Resting comfortably in bed, awake, opens eyes spontaneously  HEENT: NCAT, MMM, Normal conjunctiva, PERRL  RESP: Nonlabored breathing, No respiratory distress  CARD: Normal rate, Normal peripheral perfusion  GI: Soft, ND, NT, No guarding, No rebound tenderness  EXTREM: WWP, No edema, No gross deformity of extremities  SKIN: No rashes, no lesions  NEURO: AAOx3, No focal motor or sensory deficits  PSYCH: Affect and characteristics of appearance, verbalizations, and behaviors are appropriate

## 2022-10-06 NOTE — CONSULT NOTE ADULT - ASSESSMENT
Assessment: 67M PMH HTN, HLD, asthma, colon cancer s/p laparoscopic right colectomy (2016), chronic diverticulitis with known colovesicular fistula since July 2022, recently admitted (9/19-26) w/ chronic cystitis and bacteremia treated with IV abx, now s/p elective laparoscopic resection of colovesicular fistula, cystotomy closure, and bilateral ureteral stent placement 10/06/2022. Assessment: 67M PMH HTN, HLD, asthma, colon cancer s/p laparoscopic right colectomy (2016), chronic diverticulitis with known colovesicular fistula since July 2022, recently admitted (9/19-26) w/ chronic cystitis and bacteremia treated with IV abx, now s/p elective laparoscopic resection of colovesicular fistula, cystotomy closure, and bilateral ureteral stent placement 10/06/2022.    Recommendations:  - Keep Degroot catheter while inpatient and on discharge  - Trend urine output, drain output, and serum Cr  - Can check ROSA Cr when considering removing drain

## 2022-10-06 NOTE — BRIEF OPERATIVE NOTE - NSICDXBRIEFPREOP_GEN_ALL_CORE_FT
PRE-OP DIAGNOSIS:  Colon cancer 06-Oct-2022 18:44:18  Faustino Kimball  
PRE-OP DIAGNOSIS:  Colon cancer 06-Oct-2022 18:44:18  Faustino Kimball

## 2022-10-06 NOTE — BRIEF OPERATIVE NOTE - OPERATION/FINDINGS
Procedure: exploratory laparotomy, segmental colon resection, resection of colovesicular fistula, partial cystectomy colorectal anastomosis, rigid sigmoidoscopy, loop ileostomy creation.     Description: Bilateral ureteral stents placed by urology. Midline laparotomy. Large colovesicular fistula from sigmoid colon to bladder dome encountered, ultimately removed as an en bloc specimen after mobilizing splenic flexure and dividing colon proximally using TA stapler 60mm blue load. Partial cystectomy performed to remove specimen. Nonspecific fluid collection in the anterior pelvis drained (possibly communicating with bladder diverticulum). Colorectal anastomosis performed using 29mm EEA stapler. Bladder repaired in 3 layers with vicryl (see  note). 20F yepez placed. Bubble leak test negative. 19F Alberto drain placed in pelvis. Fascia closed with 1 looped PDS x 2, skin closed with staples. Diverting loop ileostomy Brooked in usual fashion. Procedure: exploratory laparotomy, segmental colon resection, resection of colovesicular fistula, partial cystectomy, bladder diverticulectomy, colorectal anastomosis, rigid sigmoidoscopy, loop ileostomy creation.     Description: Bilateral ureteral stents placed by urology. Midline laparotomy. Large colovesicular fistula from sigmoid colon to bladder dome encountered, ultimately removed as an en bloc specimen after mobilizing splenic flexure and dividing colon proximally using TA stapler 60mm blue load. Partial cystectomy performed to remove specimen. Nonspecific fluid collection in the anterior pelvis drained (possibly communicating with bladder diverticulum). Colorectal anastomosis performed using 29mm EEA stapler. Bladder repaired in 3 layers with vicryl (see  note). 20F yepez placed. Bubble leak test negative. 19F Alberto drain placed in pelvis. Fascia closed with 1 looped PDS x 2, skin closed with staples. Diverting loop ileostomy Brooked in usual fashion.

## 2022-10-06 NOTE — CONSULT NOTE ADULT - SUBJECTIVE AND OBJECTIVE BOX
HPI:  67M PMHx HTN, HLD, asthma, colon cancer s/p laparoscopic right colectomy (2016), chronic diverticulitis with known colovesicular fistula since July 2022, recently admitted (9/19-26) w/ chronic cystitis and bacteremia treated with IV abx, who presents today for laparoscopic resection of colovesicular fistula. 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 Pt has no acute complaints today. Will proceed to OR as planned.  (06 Oct 2022 09:20)      PAST MEDICAL & SURGICAL HISTORY:  Malignant neoplasm of hepatic flexure      Intestinal obstruction      Hypertension      High cholesterol      History of asthma      Vesicointestinal fistula      History of diverticulitis      Cystitis      H/O ascites      H/O left inguinal hernia repair      S/P right hemicolectomy  colon cancer          FAMILY HISTORY:  FH: prostate cancer (Father)    FH: bladder cancer (Father)    FH: liver cancer (Father)    FH: kidney cancer (Father)    FH: breast cancer (Mother, Sibling)    FH: multiple myeloma (Sibling)        MEDICATIONS  (STANDING):  heparin   Injectable 5000 Unit(s) SubCutaneous every 8 hours  lactated ringers. 1000 milliLiter(s) (40 mL/Hr) IV Continuous <Continuous>  metroNIDAZOLE  IVPB 500 milliGRAM(s) IV Intermittent every 8 hours    MEDICATIONS  (PRN):  acetaminophen   IVPB .. 1000 milliGRAM(s) IV Intermittent once PRN Mild Pain (1 - 3)  albuterol/ipratropium for Nebulization 3 milliLiter(s) Nebulizer every 6 hours PRN Shortness of Breath and/or Wheezing  HYDROmorphone  Injectable 0.5 milliGRAM(s) IV Push every 6 hours PRN Severe Pain (7 - 10)  HYDROmorphone  Injectable 0.25 milliGRAM(s) IV Push every 6 hours PRN Moderate Pain (4 - 6)  ondansetron Injectable 4 milliGRAM(s) IV Push every 6 hours PRN Nausea      Allergies    No Known Allergies    Intolerances        OBJECTIVE:  Vital Signs Last 24 Hrs  T(C): --  T(F): --  HR: --  BP: --  BP(mean): --  RR: --  SpO2: --        I&O's Summary      Physical Exam:  General: NAD, resting comfortably  Respiratory: No accessory muscle use  Abdominal: Soft, NT/ND  Neuro: AAO x 3, no focal deficits      LABS:              CAPILLARY BLOOD GLUCOSE      POCT Blood Glucose.: 91 mg/dL (06 Oct 2022 09:52)

## 2022-10-06 NOTE — PRE-ANESTHESIA EVALUATION ADULT - NSANTHBMIRD_ENT_A_CORE
"Subjective:   Sammy Oseguera is a 77 y.o. male here today for diabetes    Type 2 diabetes mellitus, uncontrolled  Patient is currently on Janumet  mg 1 tablet twice daily.  In clinic A1c is 6.9.    Hypertension  Patient is currently tolerating spironolactone 100 mg daily.    Alcoholic cirrhosis of liver with ascites (HCC)  Patient is currently being followed by GI and had a negative EGD in October.  He is currently on spironolactone 100 mg daily.  He is not drinking alcohol.         Current medicines (including changes today)  Current Outpatient Prescriptions   Medication Sig Dispense Refill   • spironolactone (ALDACTONE) 100 MG Tab Take 1 Tab by mouth every day. 90 Tab 3   • sitagliptan-metformin (JANUMET)  MG per tablet Take 1 Tab by mouth 2 times a day, with meals. 180 Tab 3   • Blood Glucose Monitoring Suppl Device Meter: Dispense Device of Insurance Preference. Sig. Use as directed for blood sugar monitoring. #1. NR. 1 Device 0   • Blood Glucose Monitoring Suppl Supplies Misc Test strips order: Test strips for Device of Insurance Preference. Sig: use once daily. 100 Each 3   • Lancets Misc Lancets order: Lancets for Device of Insurance Preference. Sig: use once daily. 100 Each 3   • Acetaminophen-Caffeine 500-65 MG Tab Take 1 Tab by mouth every four hours as needed (for headache). 60 Tab 0   • cyanocobalamin (VITAMIN B12) 1000 MCG Tab Take 3 Tabs by mouth every day.     • vitamin D (CHOLECALCIFEROL) 1000 UNIT TABS Take 1,000 Units by mouth every day.       No current facility-administered medications for this visit.      He  has a past medical history of HTN and Type II or unspecified type diabetes mellitus without mention of complication, not stated as uncontrolled.    ROS   No chest pain, no shortness of breath       Objective:     Blood pressure 132/72, pulse 62, temperature 36.9 °C (98.4 °F), height 1.803 m (5' 11\"), weight 76.7 kg (169 lb), SpO2 100 %. Body mass index is 23.57 kg/m². "   Physical Exam:  Constitutional: Alert, no distress.  Skin: Warm, dry, good turgor, no rashes in visible areas.  Eye: Equal, round and reactive, conjunctiva clear, lids normal.  Psych: Alert and oriented x3, normal affect and mood.        Assessment and Plan:   The following treatment plan was discussed    1. Uncontrolled type 2 diabetes mellitus with hyperglycemia (HCC)  Controlled.  Continue Janumet.  Follow-up with labs in 3-6 months.  - POCT Hemoglobin A1C  - Comp Metabolic Panel; Future  - HEMOGLOBIN A1C; Future  - MICROALBUMIN CREAT RATIO URINE; Future  - Lipid Profile; Future  - CBC WITH DIFFERENTIAL; Future    2. Essential hypertension  Controlled.  Continue spironolactone.  - CBC WITH DIFFERENTIAL; Future    3. Alcoholic cirrhosis of liver with ascites (HCC)  Controlled.  Continue spironolactone.  Continue off alcohol.  Continue following with GI.      Followup: Return in about 6 months (around 9/6/2019).       DMV paperwork completed for him to continue driving.   No

## 2022-10-06 NOTE — PROGRESS NOTE ADULT - SUBJECTIVE AND OBJECTIVE BOX
Procedure: exploratory laparotomy, segmental colon resection, resection of colovesicular fistula, partial cystectomy, bladder diverticulectomy, colorectal anastomosis, rigid sigmoidoscopy, loop ileostomy creation.  Surgeon: Dr. Zhang    S: Pt seen and examined at bedside in PACU. He has no complaints. Denies dizziness/lightheadedness,  N/V, F/C, CP, SOB, ESTRADA, calf tenderness. Pain well controlled with medication.    O:  T(F): 96.9  HR: 74 (10-06-22 @ 22:05) (66 - 74)  BP: 128/67 (10-06-22 @ 22:05) (128/67 - 142/78)  RR: 25 (10-06-22 @ 22:05) (17 - 25)  SpO2: 100% (10-06-22 @ 22:05) (100% - 100%)                          8.9    7.90  )-----------( 248      ( 06 Oct 2022 19:39 )             28.4     10-06    144  |  117<H>  |  8   ----------------------------<  124<H>  3.2<L>   |  18<L>  |  0.59    Ca    6.3<LL>      06 Oct 2022 19:39  Phos  2.7     10-06  Mg     1.0     10-06        Gen: NAD, resting comfortably in bed  C/V: NSR  Pulm: Nonlabored breathing, no respiratory distress  Abd: soft, non-distended, non-tender, no rebound, no guarding, ileostomy PPP, incisions with c/d/i gauze dressings  Extrem: WWP, no calf edema, SCDs in place      A/P: 67M PMHx HTN, HLD, asthma, colon cancer s/p laparoscopic right colectomy (2016), chronic diverticulitis with known colovesicular fistula since July 2022, recently admitted (9/19-26) w/ chronic cystitis and bacteremia treated with IV abx, who presented for laparoscopic resection of colovesicular fistula. 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 Now s/p  exploratory laparotomy, segmental colon resection, resection of colovesicular fistula, partial cystectomy, bladder diverticulectomy, colorectal anastomosis, rigid sigmoidoscopy, loop ileostomy creation on 10/6.  STAT labs drawn in PACU, showed hgb 8.9(11.5), pt asymptomatic, vitally and HD stable, consistent with previous Hgb which was 9.4. Electrolytes repleted.    NPO/IVF  pain/nausea control  cef/flagyl  yepez  SQH/SCDs  AM labs

## 2022-10-06 NOTE — BRIEF OPERATIVE NOTE - OPERATION/FINDINGS
Significant fecal material and calculi in bladder; s/p bilateral 8-26F ureteral stents placed.  Right bladder wall adherent to right common iliac artery; with hardened posterior bladder wall.  Cystotomy repair in 3 layers with vicryl. Significant fecal material and calculi in bladder; s/p bilateral 8-26F ureteral stents placed.  Right bladder wall adherent to left common iliac artery; with hardened posterior bladder wall.  Cystotomy repair in 3 layers with vicryl.

## 2022-10-06 NOTE — BRIEF OPERATIVE NOTE - NSICDXBRIEFPOSTOP_GEN_ALL_CORE_FT
POST-OP DIAGNOSIS:  Colon cancer 06-Oct-2022 18:44:32  Faustino Kimball  
POST-OP DIAGNOSIS:  Colon cancer 06-Oct-2022 18:44:32  Faustino Kimball

## 2022-10-06 NOTE — H&P ADULT - ASSESSMENT
67M PMHx HTN, HLD, asthma, colon cancer s/p laparoscopic right colectomy (2016) in remission, chronic diverticulitis with known colovesicular fistula since July 2022, recently admitted (9/19-26) w/ chronic cystitis and bacteremia treated with IV abx, who presents today for laparoscopic resection of colovesicular fistula. Pt has no acute complaints today.     - Proceed to OR as planned.  67M PMHx HTN, HLD, asthma, colon cancer s/p laparoscopic right colectomy (2016), chronic diverticulitis with known colovesicular fistula since July 2022, recently admitted (9/19-26) w/ chronic cystitis and bacteremia treated with IV abx, who presents today for laparoscopic resection of colovesicular fistula. 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 Pt has no acute complaints today.     - Proceed to OR as planned.

## 2022-10-06 NOTE — BRIEF OPERATIVE NOTE - SPECIMENS
colon 1)sigmoid colon and cuff of bladder, 2)R retroperitoneal implant, 3)bladder diverticulum, 4)anastomotic donuts

## 2022-10-06 NOTE — H&P ADULT - NSICDXPASTSURGICALHX_GEN_ALL_CORE_FT
PAST SURGICAL HISTORY:  H/O left inguinal hernia repair     S/P right hemicolectomy colon cancer

## 2022-10-06 NOTE — H&P ADULT - HISTORY OF PRESENT ILLNESS
67M PMHx HTN, HLD, asthma, colon cancer s/p laparoscopic right colectomy (2016) in remission, chronic diverticulitis with known colovesicular fistula since July 2022, recently admitted (9/19-26) w/ chronic cystitis and bacteremia treated with IV abx, who presents today for laparoscopic resection of colovesicular fistula. Pt has no acute complaints today. Will proceed to OR as planned.  67M PMHx HTN, HLD, asthma, colon cancer s/p laparoscopic right colectomy (2016), chronic diverticulitis with known colovesicular fistula since July 2022, recently admitted (9/19-26) w/ chronic cystitis and bacteremia treated with IV abx, who presents today for laparoscopic resection of colovesicular fistula. 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 Pt has no acute complaints today. Will proceed to OR as planned.

## 2022-10-07 LAB
ANION GAP SERPL CALC-SCNC: 8 MMOL/L — SIGNIFICANT CHANGE UP (ref 5–17)
BILIRUB SERPL-MCNC: 0.3 MG/DL — SIGNIFICANT CHANGE UP (ref 0.2–1.2)
BUN SERPL-MCNC: 13 MG/DL — SIGNIFICANT CHANGE UP (ref 7–23)
CALCIUM SERPL-MCNC: 8.8 MG/DL — SIGNIFICANT CHANGE UP (ref 8.4–10.5)
CHLORIDE SERPL-SCNC: 104 MMOL/L — SIGNIFICANT CHANGE UP (ref 96–108)
CO2 SERPL-SCNC: 25 MMOL/L — SIGNIFICANT CHANGE UP (ref 22–31)
CREAT SERPL-MCNC: 1.01 MG/DL — SIGNIFICANT CHANGE UP (ref 0.5–1.3)
EGFR: 81 ML/MIN/1.73M2 — SIGNIFICANT CHANGE UP
GLUCOSE SERPL-MCNC: 175 MG/DL — HIGH (ref 70–99)
HCT VFR BLD CALC: 29.9 % — LOW (ref 39–50)
HCV AB S/CO SERPL IA: 0.03 S/CO — SIGNIFICANT CHANGE UP
HCV AB SERPL-IMP: SIGNIFICANT CHANGE UP
HGB BLD-MCNC: 9.4 G/DL — LOW (ref 13–17)
INR BLD: 1.17 — HIGH (ref 0.88–1.16)
MAGNESIUM SERPL-MCNC: 2.5 MG/DL — SIGNIFICANT CHANGE UP (ref 1.6–2.6)
MCHC RBC-ENTMCNC: 25 PG — LOW (ref 27–34)
MCHC RBC-ENTMCNC: 31.4 GM/DL — LOW (ref 32–36)
MCV RBC AUTO: 79.5 FL — LOW (ref 80–100)
MELD SCORE WITH DIALYSIS: 21 POINTS — SIGNIFICANT CHANGE UP
MELD SCORE WITHOUT DIALYSIS: 8 POINTS — SIGNIFICANT CHANGE UP
NRBC # BLD: 0 /100 WBCS — SIGNIFICANT CHANGE UP (ref 0–0)
PHOSPHATE SERPL-MCNC: 3.6 MG/DL — SIGNIFICANT CHANGE UP (ref 2.5–4.5)
PLATELET # BLD AUTO: 293 K/UL — SIGNIFICANT CHANGE UP (ref 150–400)
POTASSIUM SERPL-MCNC: 5.2 MMOL/L — SIGNIFICANT CHANGE UP (ref 3.5–5.3)
POTASSIUM SERPL-SCNC: 5.2 MMOL/L — SIGNIFICANT CHANGE UP (ref 3.5–5.3)
PROTHROM AB SERPL-ACNC: 13.9 SEC — HIGH (ref 10.5–13.4)
RBC # BLD: 3.76 M/UL — LOW (ref 4.2–5.8)
RBC # FLD: 19.5 % — HIGH (ref 10.3–14.5)
SODIUM SERPL-SCNC: 137 MMOL/L — SIGNIFICANT CHANGE UP (ref 135–145)
WBC # BLD: 11.57 K/UL — HIGH (ref 3.8–10.5)
WBC # FLD AUTO: 11.57 K/UL — HIGH (ref 3.8–10.5)

## 2022-10-07 RX ORDER — INFLUENZA VIRUS VACCINE 15; 15; 15; 15 UG/.5ML; UG/.5ML; UG/.5ML; UG/.5ML
0.7 SUSPENSION INTRAMUSCULAR ONCE
Refills: 0 | Status: DISCONTINUED | OUTPATIENT
Start: 2022-10-07 | End: 2022-10-14

## 2022-10-07 RX ORDER — ACETAMINOPHEN 500 MG
1000 TABLET ORAL ONCE
Refills: 0 | Status: COMPLETED | OUTPATIENT
Start: 2022-10-07 | End: 2022-10-07

## 2022-10-07 RX ADMIN — HYDROMORPHONE HYDROCHLORIDE 0.5 MILLIGRAM(S): 2 INJECTION INTRAMUSCULAR; INTRAVENOUS; SUBCUTANEOUS at 14:14

## 2022-10-07 RX ADMIN — Medication 50 GRAM(S): at 00:59

## 2022-10-07 RX ADMIN — HYDROMORPHONE HYDROCHLORIDE 0.5 MILLIGRAM(S): 2 INJECTION INTRAMUSCULAR; INTRAVENOUS; SUBCUTANEOUS at 05:14

## 2022-10-07 RX ADMIN — Medication 1000 MILLIGRAM(S): at 20:50

## 2022-10-07 RX ADMIN — HYDROMORPHONE HYDROCHLORIDE 0.25 MILLIGRAM(S): 2 INJECTION INTRAMUSCULAR; INTRAVENOUS; SUBCUTANEOUS at 03:17

## 2022-10-07 RX ADMIN — HEPARIN SODIUM 5000 UNIT(S): 5000 INJECTION INTRAVENOUS; SUBCUTANEOUS at 15:53

## 2022-10-07 RX ADMIN — Medication 100 MILLIGRAM(S): at 22:05

## 2022-10-07 RX ADMIN — HYDROMORPHONE HYDROCHLORIDE 0.5 MILLIGRAM(S): 2 INJECTION INTRAMUSCULAR; INTRAVENOUS; SUBCUTANEOUS at 19:39

## 2022-10-07 RX ADMIN — Medication 100 MILLIEQUIVALENT(S): at 01:00

## 2022-10-07 RX ADMIN — CEFTRIAXONE 100 MILLIGRAM(S): 500 INJECTION, POWDER, FOR SOLUTION INTRAMUSCULAR; INTRAVENOUS at 14:14

## 2022-10-07 RX ADMIN — HEPARIN SODIUM 5000 UNIT(S): 5000 INJECTION INTRAVENOUS; SUBCUTANEOUS at 07:23

## 2022-10-07 RX ADMIN — HEPARIN SODIUM 5000 UNIT(S): 5000 INJECTION INTRAVENOUS; SUBCUTANEOUS at 23:06

## 2022-10-07 RX ADMIN — Medication 100 MILLIEQUIVALENT(S): at 02:17

## 2022-10-07 RX ADMIN — HYDROMORPHONE HYDROCHLORIDE 0.25 MILLIGRAM(S): 2 INJECTION INTRAMUSCULAR; INTRAVENOUS; SUBCUTANEOUS at 17:43

## 2022-10-07 RX ADMIN — Medication 100 MILLIGRAM(S): at 14:14

## 2022-10-07 RX ADMIN — Medication 400 MILLIGRAM(S): at 07:23

## 2022-10-07 RX ADMIN — Medication 400 MILLIGRAM(S): at 20:08

## 2022-10-07 RX ADMIN — HYDROMORPHONE HYDROCHLORIDE 0.5 MILLIGRAM(S): 2 INJECTION INTRAMUSCULAR; INTRAVENOUS; SUBCUTANEOUS at 14:45

## 2022-10-07 RX ADMIN — HYDROMORPHONE HYDROCHLORIDE 0.25 MILLIGRAM(S): 2 INJECTION INTRAMUSCULAR; INTRAVENOUS; SUBCUTANEOUS at 07:50

## 2022-10-07 RX ADMIN — HYDROMORPHONE HYDROCHLORIDE 0.5 MILLIGRAM(S): 2 INJECTION INTRAMUSCULAR; INTRAVENOUS; SUBCUTANEOUS at 04:44

## 2022-10-07 RX ADMIN — HYDROMORPHONE HYDROCHLORIDE 0.25 MILLIGRAM(S): 2 INJECTION INTRAMUSCULAR; INTRAVENOUS; SUBCUTANEOUS at 18:22

## 2022-10-07 RX ADMIN — Medication 100 MILLIGRAM(S): at 06:49

## 2022-10-07 NOTE — PROGRESS NOTE ADULT - SUBJECTIVE AND OBJECTIVE BOX
SUBJECTIVE: No nausea, pain requiring IV medications      OBJECTIVE:    Vital Signs:  Vital Signs Last 24 Hrs  T(C): 37 (07 Oct 2022 09:23), Max: 37.2 (07 Oct 2022 00:07)  T(F): 98.6 (07 Oct 2022 09:23), Max: 98.9 (07 Oct 2022 00:07)  HR: 78 (07 Oct 2022 12:00) (66 - 78)  BP: 106/58 (07 Oct 2022 12:00) (106/58 - 142/78)  BP(mean): 79 (07 Oct 2022 12:00) (76 - 105)  RR: 18 (07 Oct 2022 12:00) (16 - 25)  SpO2: 97% (07 Oct 2022 12:00) (96% - 100%)    Parameters below as of 07 Oct 2022 12:00  Patient On (Oxygen Delivery Method): room air        Physical Exam:  General: NAD  Pulmonary: Nonlabored breathing, no respiratory distress  Abdominal: Soft, slightly distended, tender to deep palpation in all quadrants, midline dressing in place, ROSA with serosanguinous output  : Degroot catheter draining light pink urine    Ins and Outs:  I&O's Summary    06 Oct 2022 07:01  -  07 Oct 2022 07:00  --------------------------------------------------------  IN: 1430 mL / OUT: 1280 mL / NET: 150 mL    07 Oct 2022 07:01  -  07 Oct 2022 13:22  --------------------------------------------------------  IN: 660 mL / OUT: 805 mL / NET: -145 mL        Labs:                        9.4    11.57 )-----------( 293      ( 07 Oct 2022 05:30 )             29.9     10-07    137  |  104  |  13  ----------------------------<  175<H>  5.2   |  25  |  1.01    Ca    8.8      07 Oct 2022 05:30  Phos  3.6     10-07  Mg     2.5     10-07    TPro  x   /  Alb  x   /  TBili  0.3  /  DBili  x   /  AST  x   /  ALT  x   /  AlkPhos  x   10-07    PT/INR - ( 07 Oct 2022 05:30 )   PT: 13.9 sec;   INR: 1.17              CAPILLARY BLOOD GLUCOSE

## 2022-10-07 NOTE — PROGRESS NOTE ADULT - ASSESSMENT
Assessment: 67M PMH HTN, HLD, asthma, colon cancer s/p laparoscopic right colectomy (2016), chronic diverticulitis with known colovesicular fistula since July 2022, recently admitted (9/19-26) w/ chronic cystitis and bacteremia treated with IV abx, now s/p elective laparoscopic resection of colovesicular fistula, cystotomy closure, and bilateral ureteral stent placement 10/06/2022.    Recommendations:  - Keep Degroot catheter while inpatient and on discharge  - Trend urine output, drain output, and serum Cr  - Can check ROSA Cr when considering removing drain  - Follow up with Dr. Torre within 2 weeks of discharge for catheter removal

## 2022-10-07 NOTE — PROGRESS NOTE ADULT - ASSESSMENT
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 Now s/p  exploratory laparotomy, segmental colon resection, resection of colovesicular fistula, partial cystectomy, bladder diverticulectomy, colorectal anastomosis, rigid sigmoidoscopy, loop ileostomy creation on 10/6.    NPO/IVF - LR @ 110   pain/nausea control  cef/flagyl  yepez  SQH/SCDs  AM labs

## 2022-10-07 NOTE — PROGRESS NOTE ADULT - SUBJECTIVE AND OBJECTIVE BOX
STATUS POST:  Exploratory laparotomy  Closure, fistula, enterovesical, with cystectomy  Sigmoidoscopy  Colon resection  Closure, cystotomy  Cystoscopic placement of bilateral ureteral stents      POST OPERATIVE DAY #: 1    SUBJECTIVE: Patient seen and examined bedside with chief resident. Patient denies any acute symptoms. Pain is well controlled. Patient denies N/V/CP.    Vital Signs Last 24 Hrs  T(C): 37 (07 Oct 2022 09:23), Max: 37.2 (07 Oct 2022 00:07)  T(F): 98.6 (07 Oct 2022 09:23), Max: 98.9 (07 Oct 2022 00:07)  HR: 76 (07 Oct 2022 08:15) (66 - 76)  BP: 108/56 (07 Oct 2022 08:15) (108/56 - 142/78)  BP(mean): 76 (07 Oct 2022 08:15) (76 - 105)  RR: 17 (07 Oct 2022 08:15) (16 - 25)  SpO2: 96% (07 Oct 2022 08:15) (96% - 100%)    Parameters below as of 07 Oct 2022 08:15  Patient On (Oxygen Delivery Method): room air        I&O's Summary    06 Oct 2022 07:01  -  07 Oct 2022 07:00  --------------------------------------------------------  IN: 1430 mL / OUT: 1280 mL / NET: 150 mL    07 Oct 2022 07:01  -  07 Oct 2022 10:32  --------------------------------------------------------  IN: 330 mL / OUT: 435 mL / NET: -105 mL        Physical Exam:  Gen: NAD, resting comfortably in bed  C/V: NSR  Pulm: Nonlabored breathing, no respiratory distress  Abd: soft, non-distended, non-tender, no rebound, no guarding, ileostomy PPP, incisions with c/d/i gauze dressings  Extrem: WWP, no calf edema, SCDs in place     LABS:                        9.4    11.57 )-----------( 293      ( 07 Oct 2022 05:30 )             29.9     10-07    137  |  104  |  13  ----------------------------<  175<H>  5.2   |  25  |  1.01    Ca    8.8      07 Oct 2022 05:30  Phos  3.6     10-07  Mg     2.5     10-07    TPro  x   /  Alb  x   /  TBili  0.3  /  DBili  x   /  AST  x   /  ALT  x   /  AlkPhos  x   10-07    PT/INR - ( 07 Oct 2022 05:30 )   PT: 13.9 sec;   INR: 1.17

## 2022-10-07 NOTE — PROGRESS NOTE ADULT - SUBJECTIVE AND OBJECTIVE BOX
POD#1  AVSS  adequate UO  labs OK  abd soft  stoma with some function    OOB   Pulmonary toilet  NPO  IVF  IVAB

## 2022-10-08 LAB
-  AMPICILLIN: SIGNIFICANT CHANGE UP
-  CIPROFLOXACIN: SIGNIFICANT CHANGE UP
-  NITROFURANTOIN: SIGNIFICANT CHANGE UP
-  TETRACYCLINE: SIGNIFICANT CHANGE UP
-  VANCOMYCIN: SIGNIFICANT CHANGE UP
ANION GAP SERPL CALC-SCNC: 8 MMOL/L — SIGNIFICANT CHANGE UP (ref 5–17)
BUN SERPL-MCNC: 10 MG/DL — SIGNIFICANT CHANGE UP (ref 7–23)
CALCIUM SERPL-MCNC: 9.2 MG/DL — SIGNIFICANT CHANGE UP (ref 8.4–10.5)
CHLORIDE SERPL-SCNC: 102 MMOL/L — SIGNIFICANT CHANGE UP (ref 96–108)
CO2 SERPL-SCNC: 28 MMOL/L — SIGNIFICANT CHANGE UP (ref 22–31)
CREAT SERPL-MCNC: 0.78 MG/DL — SIGNIFICANT CHANGE UP (ref 0.5–1.3)
EGFR: 97 ML/MIN/1.73M2 — SIGNIFICANT CHANGE UP
GLUCOSE SERPL-MCNC: 68 MG/DL — LOW (ref 70–99)
HCT VFR BLD CALC: 30.3 % — LOW (ref 39–50)
HGB BLD-MCNC: 9.3 G/DL — LOW (ref 13–17)
MAGNESIUM SERPL-MCNC: 2 MG/DL — SIGNIFICANT CHANGE UP (ref 1.6–2.6)
MCHC RBC-ENTMCNC: 24.8 PG — LOW (ref 27–34)
MCHC RBC-ENTMCNC: 30.7 GM/DL — LOW (ref 32–36)
MCV RBC AUTO: 80.8 FL — SIGNIFICANT CHANGE UP (ref 80–100)
METHOD TYPE: SIGNIFICANT CHANGE UP
NRBC # BLD: 0 /100 WBCS — SIGNIFICANT CHANGE UP (ref 0–0)
PHOSPHATE SERPL-MCNC: 3.1 MG/DL — SIGNIFICANT CHANGE UP (ref 2.5–4.5)
PLATELET # BLD AUTO: 207 K/UL — SIGNIFICANT CHANGE UP (ref 150–400)
POTASSIUM SERPL-MCNC: 4.5 MMOL/L — SIGNIFICANT CHANGE UP (ref 3.5–5.3)
POTASSIUM SERPL-SCNC: 4.5 MMOL/L — SIGNIFICANT CHANGE UP (ref 3.5–5.3)
RBC # BLD: 3.75 M/UL — LOW (ref 4.2–5.8)
RBC # FLD: 20 % — HIGH (ref 10.3–14.5)
SODIUM SERPL-SCNC: 138 MMOL/L — SIGNIFICANT CHANGE UP (ref 135–145)
WBC # BLD: 11.33 K/UL — HIGH (ref 3.8–10.5)
WBC # FLD AUTO: 11.33 K/UL — HIGH (ref 3.8–10.5)

## 2022-10-08 RX ORDER — CHLORHEXIDINE GLUCONATE 213 G/1000ML
1 SOLUTION TOPICAL
Refills: 0 | Status: DISCONTINUED | OUTPATIENT
Start: 2022-10-08 | End: 2022-10-14

## 2022-10-08 RX ORDER — ACETAMINOPHEN 500 MG
1000 TABLET ORAL ONCE
Refills: 0 | Status: COMPLETED | OUTPATIENT
Start: 2022-10-08 | End: 2022-10-08

## 2022-10-08 RX ORDER — ACETAMINOPHEN 500 MG
650 TABLET ORAL EVERY 6 HOURS
Refills: 0 | Status: DISCONTINUED | OUTPATIENT
Start: 2022-10-08 | End: 2022-10-08

## 2022-10-08 RX ORDER — FAMOTIDINE 10 MG/ML
20 INJECTION INTRAVENOUS ONCE
Refills: 0 | Status: COMPLETED | OUTPATIENT
Start: 2022-10-08 | End: 2022-10-08

## 2022-10-08 RX ORDER — SODIUM CHLORIDE 9 MG/ML
10 INJECTION INTRAMUSCULAR; INTRAVENOUS; SUBCUTANEOUS
Refills: 0 | Status: DISCONTINUED | OUTPATIENT
Start: 2022-10-08 | End: 2022-10-14

## 2022-10-08 RX ADMIN — HEPARIN SODIUM 5000 UNIT(S): 5000 INJECTION INTRAVENOUS; SUBCUTANEOUS at 07:34

## 2022-10-08 RX ADMIN — Medication 400 MILLIGRAM(S): at 12:16

## 2022-10-08 RX ADMIN — HYDROMORPHONE HYDROCHLORIDE 0.25 MILLIGRAM(S): 2 INJECTION INTRAMUSCULAR; INTRAVENOUS; SUBCUTANEOUS at 10:45

## 2022-10-08 RX ADMIN — HYDROMORPHONE HYDROCHLORIDE 0.5 MILLIGRAM(S): 2 INJECTION INTRAMUSCULAR; INTRAVENOUS; SUBCUTANEOUS at 01:41

## 2022-10-08 RX ADMIN — HYDROMORPHONE HYDROCHLORIDE 0.5 MILLIGRAM(S): 2 INJECTION INTRAMUSCULAR; INTRAVENOUS; SUBCUTANEOUS at 02:07

## 2022-10-08 RX ADMIN — HYDROMORPHONE HYDROCHLORIDE 0.5 MILLIGRAM(S): 2 INJECTION INTRAMUSCULAR; INTRAVENOUS; SUBCUTANEOUS at 16:24

## 2022-10-08 RX ADMIN — CEFTRIAXONE 100 MILLIGRAM(S): 500 INJECTION, POWDER, FOR SOLUTION INTRAMUSCULAR; INTRAVENOUS at 14:26

## 2022-10-08 RX ADMIN — Medication 100 MILLIGRAM(S): at 22:02

## 2022-10-08 RX ADMIN — HYDROMORPHONE HYDROCHLORIDE 0.25 MILLIGRAM(S): 2 INJECTION INTRAMUSCULAR; INTRAVENOUS; SUBCUTANEOUS at 10:27

## 2022-10-08 RX ADMIN — HYDROMORPHONE HYDROCHLORIDE 0.25 MILLIGRAM(S): 2 INJECTION INTRAMUSCULAR; INTRAVENOUS; SUBCUTANEOUS at 21:23

## 2022-10-08 RX ADMIN — HYDROMORPHONE HYDROCHLORIDE 0.5 MILLIGRAM(S): 2 INJECTION INTRAMUSCULAR; INTRAVENOUS; SUBCUTANEOUS at 07:34

## 2022-10-08 RX ADMIN — Medication 1000 MILLIGRAM(S): at 13:20

## 2022-10-08 RX ADMIN — Medication 100 MILLIGRAM(S): at 14:26

## 2022-10-08 RX ADMIN — FAMOTIDINE 20 MILLIGRAM(S): 10 INJECTION INTRAVENOUS at 05:13

## 2022-10-08 RX ADMIN — Medication 100 MILLIGRAM(S): at 06:22

## 2022-10-08 RX ADMIN — CHLORHEXIDINE GLUCONATE 1 APPLICATION(S): 213 SOLUTION TOPICAL at 06:23

## 2022-10-08 RX ADMIN — HEPARIN SODIUM 5000 UNIT(S): 5000 INJECTION INTRAVENOUS; SUBCUTANEOUS at 15:31

## 2022-10-08 RX ADMIN — HEPARIN SODIUM 5000 UNIT(S): 5000 INJECTION INTRAVENOUS; SUBCUTANEOUS at 23:45

## 2022-10-08 RX ADMIN — HYDROMORPHONE HYDROCHLORIDE 0.5 MILLIGRAM(S): 2 INJECTION INTRAMUSCULAR; INTRAVENOUS; SUBCUTANEOUS at 17:47

## 2022-10-08 RX ADMIN — HYDROMORPHONE HYDROCHLORIDE 0.5 MILLIGRAM(S): 2 INJECTION INTRAMUSCULAR; INTRAVENOUS; SUBCUTANEOUS at 08:08

## 2022-10-08 NOTE — PROGRESS NOTE ADULT - SUBJECTIVE AND OBJECTIVE BOX
STATUS POST:  Exploratory laparotomy, Closure, fistula, enterovesical, with cystectomy, Colon resection      POST OPERATIVE DAY #: 2    SUBJECTIVE:   Patient seen and examined at bedside by chief during AM rounds. No acute events overnight. Patient reports he is doing well this morning. Reports pain is manageable but reports being out of bed secondary to pain. Denies nausea or vomiting at this time. Denies fevers, chills, CP, SOB.    Vital Signs Last 24 Hrs  T(C): 36.9 (08 Oct 2022 17:53), Max: 37.1 (08 Oct 2022 09:05)  T(F): 98.5 (08 Oct 2022 17:53), Max: 98.7 (08 Oct 2022 09:05)  HR: 74 (08 Oct 2022 16:25) (74 - 84)  BP: 150/75 (08 Oct 2022 16:25) (119/56 - 150/75)  BP(mean): 105 (08 Oct 2022 16:25) (81 - 105)  RR: 16 (08 Oct 2022 16:25) (16 - 18)  SpO2: 94% (08 Oct 2022 16:25) (93% - 94%)    Parameters below as of 08 Oct 2022 16:25  Patient On (Oxygen Delivery Method): room air        I&O's Summary    07 Oct 2022 07:01  -  08 Oct 2022 07:00  --------------------------------------------------------  IN: 1750 mL / OUT: 3895 mL / NET: -2145 mL    08 Oct 2022 07:01  -  08 Oct 2022 18:34  --------------------------------------------------------  IN: 1140 mL / OUT: 905 mL / NET: 235 mL        Physical Exam:  General: Resting comfortably in bed, NAD  HEENT: ATNC  Pulmonary: Nonlabored breathing, no respiratory distress, no accessory muscle use noted  Cardiovascular: NSR  Abdomen: Soft, nondistended, mild tenderness to palpation throughout without rebound or guarding. Ostomy noted in RLQ to be pink and patent with bowel sweat and bilious output. LLQ ROSA with SSoutput  Extremities: WWP, SCDs in place, No significant edema appreciated    LABS:                        9.3    11.33 )-----------( 207      ( 08 Oct 2022 06:56 )             30.3     10-08    138  |  102  |  10  ----------------------------<  68<L>  4.5   |  28  |  0.78    Ca    9.2      08 Oct 2022 06:56  Phos  3.1     10-08  Mg     2.0     10-08    TPro  x   /  Alb  x   /  TBili  0.3  /  DBili  x   /  AST  x   /  ALT  x   /  AlkPhos  x   10-07    PT/INR - ( 07 Oct 2022 05:30 )   PT: 13.9 sec;   INR: 1.17

## 2022-10-08 NOTE — PROGRESS NOTE ADULT - ASSESSMENT
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 Now s/p  exploratory laparotomy, segmental colon resection, resection of colovesicular fistula, partial cystectomy, bladder diverticulectomy, colorectal anastomosis, rigid sigmoidoscopy, loop ileostomy creation on 10/6. POD2 and pain is better controlled. Patient has not been out of bed.    NPO/IVF - LR @ 110   pain/nausea control PRN  cef/flagyl  Degroot catheter  Encourage patient to be OOB and ambulating  IS  SQH/SCDs

## 2022-10-09 LAB
-  LINEZOLID: SIGNIFICANT CHANGE UP
-  NITROFURANTOIN: SIGNIFICANT CHANGE UP
-  OXACILLIN: SIGNIFICANT CHANGE UP
-  RIFAMPIN: SIGNIFICANT CHANGE UP
-  TRIMETHOPRIM/SULFAMETHOXAZOLE: SIGNIFICANT CHANGE UP
-  VANCOMYCIN: SIGNIFICANT CHANGE UP
ANION GAP SERPL CALC-SCNC: 12 MMOL/L — SIGNIFICANT CHANGE UP (ref 5–17)
BUN SERPL-MCNC: 13 MG/DL — SIGNIFICANT CHANGE UP (ref 7–23)
CALCIUM SERPL-MCNC: 9.3 MG/DL — SIGNIFICANT CHANGE UP (ref 8.4–10.5)
CHLORIDE SERPL-SCNC: 98 MMOL/L — SIGNIFICANT CHANGE UP (ref 96–108)
CO2 SERPL-SCNC: 26 MMOL/L — SIGNIFICANT CHANGE UP (ref 22–31)
CREAT SERPL-MCNC: 0.63 MG/DL — SIGNIFICANT CHANGE UP (ref 0.5–1.3)
EGFR: 104 ML/MIN/1.73M2 — SIGNIFICANT CHANGE UP
GLUCOSE BLDC GLUCOMTR-MCNC: 105 MG/DL — HIGH (ref 70–99)
GLUCOSE BLDC GLUCOMTR-MCNC: 64 MG/DL — LOW (ref 70–99)
GLUCOSE BLDC GLUCOMTR-MCNC: 64 MG/DL — LOW (ref 70–99)
GLUCOSE SERPL-MCNC: 58 MG/DL — LOW (ref 70–99)
HCT VFR BLD CALC: 29.9 % — LOW (ref 39–50)
HGB BLD-MCNC: 9.5 G/DL — LOW (ref 13–17)
MAGNESIUM SERPL-MCNC: 1.9 MG/DL — SIGNIFICANT CHANGE UP (ref 1.6–2.6)
MCHC RBC-ENTMCNC: 25.4 PG — LOW (ref 27–34)
MCHC RBC-ENTMCNC: 31.8 GM/DL — LOW (ref 32–36)
MCV RBC AUTO: 79.9 FL — LOW (ref 80–100)
METHOD TYPE: SIGNIFICANT CHANGE UP
NRBC # BLD: 0 /100 WBCS — SIGNIFICANT CHANGE UP (ref 0–0)
PHOSPHATE SERPL-MCNC: 3.2 MG/DL — SIGNIFICANT CHANGE UP (ref 2.5–4.5)
PLATELET # BLD AUTO: 294 K/UL — SIGNIFICANT CHANGE UP (ref 150–400)
POTASSIUM SERPL-MCNC: 4.4 MMOL/L — SIGNIFICANT CHANGE UP (ref 3.5–5.3)
POTASSIUM SERPL-SCNC: 4.4 MMOL/L — SIGNIFICANT CHANGE UP (ref 3.5–5.3)
RBC # BLD: 3.74 M/UL — LOW (ref 4.2–5.8)
RBC # FLD: 19.3 % — HIGH (ref 10.3–14.5)
SODIUM SERPL-SCNC: 136 MMOL/L — SIGNIFICANT CHANGE UP (ref 135–145)
WBC # BLD: 12.49 K/UL — HIGH (ref 3.8–10.5)
WBC # FLD AUTO: 12.49 K/UL — HIGH (ref 3.8–10.5)

## 2022-10-09 RX ORDER — FAMOTIDINE 10 MG/ML
20 INJECTION INTRAVENOUS ONCE
Refills: 0 | Status: COMPLETED | OUTPATIENT
Start: 2022-10-09 | End: 2022-10-09

## 2022-10-09 RX ORDER — DEXTROSE 50 % IN WATER 50 %
12.5 SYRINGE (ML) INTRAVENOUS ONCE
Refills: 0 | Status: COMPLETED | OUTPATIENT
Start: 2022-10-09 | End: 2023-09-07

## 2022-10-09 RX ORDER — DEXTROSE 50 % IN WATER 50 %
12.5 SYRINGE (ML) INTRAVENOUS ONCE
Refills: 0 | Status: COMPLETED | OUTPATIENT
Start: 2022-10-09 | End: 2022-10-09

## 2022-10-09 RX ORDER — ACETAMINOPHEN 500 MG
1000 TABLET ORAL EVERY 6 HOURS
Refills: 0 | Status: COMPLETED | OUTPATIENT
Start: 2022-10-10 | End: 2022-10-11

## 2022-10-09 RX ORDER — SODIUM CHLORIDE 9 MG/ML
500 INJECTION, SOLUTION INTRAVENOUS ONCE
Refills: 0 | Status: COMPLETED | OUTPATIENT
Start: 2022-10-09 | End: 2022-10-09

## 2022-10-09 RX ORDER — SODIUM CHLORIDE 9 MG/ML
1000 INJECTION, SOLUTION INTRAVENOUS
Refills: 0 | Status: DISCONTINUED | OUTPATIENT
Start: 2022-10-09 | End: 2022-10-12

## 2022-10-09 RX ORDER — ACETAMINOPHEN 500 MG
1000 TABLET ORAL ONCE
Refills: 0 | Status: COMPLETED | OUTPATIENT
Start: 2022-10-09 | End: 2022-10-09

## 2022-10-09 RX ORDER — ACETAMINOPHEN 500 MG
1000 TABLET ORAL EVERY 6 HOURS
Refills: 0 | Status: COMPLETED | OUTPATIENT
Start: 2022-10-09 | End: 2022-10-10

## 2022-10-09 RX ADMIN — Medication 400 MILLIGRAM(S): at 11:33

## 2022-10-09 RX ADMIN — SODIUM CHLORIDE 110 MILLILITER(S): 9 INJECTION, SOLUTION INTRAVENOUS at 17:31

## 2022-10-09 RX ADMIN — SODIUM CHLORIDE 110 MILLILITER(S): 9 INJECTION, SOLUTION INTRAVENOUS at 16:23

## 2022-10-09 RX ADMIN — Medication 12.5 GRAM(S): at 17:58

## 2022-10-09 RX ADMIN — FAMOTIDINE 20 MILLIGRAM(S): 10 INJECTION INTRAVENOUS at 17:57

## 2022-10-09 RX ADMIN — HYDROMORPHONE HYDROCHLORIDE 0.5 MILLIGRAM(S): 2 INJECTION INTRAMUSCULAR; INTRAVENOUS; SUBCUTANEOUS at 00:47

## 2022-10-09 RX ADMIN — Medication 400 MILLIGRAM(S): at 18:40

## 2022-10-09 RX ADMIN — SODIUM CHLORIDE 500 MILLILITER(S): 9 INJECTION, SOLUTION INTRAVENOUS at 15:58

## 2022-10-09 RX ADMIN — Medication 1000 MILLIGRAM(S): at 07:00

## 2022-10-09 RX ADMIN — HYDROMORPHONE HYDROCHLORIDE 0.5 MILLIGRAM(S): 2 INJECTION INTRAMUSCULAR; INTRAVENOUS; SUBCUTANEOUS at 10:22

## 2022-10-09 RX ADMIN — Medication 1000 MILLIGRAM(S): at 18:46

## 2022-10-09 RX ADMIN — CEFTRIAXONE 100 MILLIGRAM(S): 500 INJECTION, POWDER, FOR SOLUTION INTRAMUSCULAR; INTRAVENOUS at 15:37

## 2022-10-09 RX ADMIN — HEPARIN SODIUM 5000 UNIT(S): 5000 INJECTION INTRAVENOUS; SUBCUTANEOUS at 07:57

## 2022-10-09 RX ADMIN — HYDROMORPHONE HYDROCHLORIDE 0.5 MILLIGRAM(S): 2 INJECTION INTRAMUSCULAR; INTRAVENOUS; SUBCUTANEOUS at 00:46

## 2022-10-09 RX ADMIN — HEPARIN SODIUM 5000 UNIT(S): 5000 INJECTION INTRAVENOUS; SUBCUTANEOUS at 22:39

## 2022-10-09 RX ADMIN — HYDROMORPHONE HYDROCHLORIDE 0.25 MILLIGRAM(S): 2 INJECTION INTRAMUSCULAR; INTRAVENOUS; SUBCUTANEOUS at 00:37

## 2022-10-09 RX ADMIN — Medication 100 MILLIGRAM(S): at 06:17

## 2022-10-09 RX ADMIN — Medication 100 MILLIGRAM(S): at 14:28

## 2022-10-09 RX ADMIN — CHLORHEXIDINE GLUCONATE 1 APPLICATION(S): 213 SOLUTION TOPICAL at 06:56

## 2022-10-09 RX ADMIN — HYDROMORPHONE HYDROCHLORIDE 0.5 MILLIGRAM(S): 2 INJECTION INTRAMUSCULAR; INTRAVENOUS; SUBCUTANEOUS at 08:42

## 2022-10-09 RX ADMIN — Medication 100 MILLIGRAM(S): at 22:16

## 2022-10-09 RX ADMIN — HEPARIN SODIUM 5000 UNIT(S): 5000 INJECTION INTRAVENOUS; SUBCUTANEOUS at 15:37

## 2022-10-09 RX ADMIN — SODIUM CHLORIDE 110 MILLILITER(S): 9 INJECTION, SOLUTION INTRAVENOUS at 07:57

## 2022-10-09 RX ADMIN — Medication 1000 MILLIGRAM(S): at 11:33

## 2022-10-09 RX ADMIN — Medication 400 MILLIGRAM(S): at 06:16

## 2022-10-09 NOTE — PROGRESS NOTE ADULT - SUBJECTIVE AND OBJECTIVE BOX
Pt seen and examined at bedside this morning on 8La. Attending coverage for Dr. Zhang. Generally agree with the above findings.   67M with malignant colovesical fistula now s/p open sigmoidectomy with partial cystectomy, primary anastomosis and bladder closure with diverting loop ileostomy, POD3.  - Pain control improved, denies nausea, small amount of biliary succus in ileostomy appliance.  - Vitals WNL  - Abdomen softly distended with appropriate tenderness. Ileostomy pink with succus in bag.  - Will continue yepez  - Encouraged ambulation.  - CLD this afternoon if continued BF.

## 2022-10-09 NOTE — PROGRESS NOTE ADULT - SUBJECTIVE AND OBJECTIVE BOX
SURGERY DAILY PROGRESS NOTE:     SUBJECTIVE/ROS: No acute overnight events. Patient seen and examined at bedside during morning rounds. Pain control is improved today; no reported nausea or emesis.     OBJECTIVE:    Vital Signs Last 24 Hrs  T(C): 36.7 (09 Oct 2022 09:58), Max: 37.1 (09 Oct 2022 05:00)  T(F): 98 (09 Oct 2022 09:58), Max: 98.7 (09 Oct 2022 05:00)  HR: 71 (09 Oct 2022 09:58) (68 - 82)  BP: 138/77 (09 Oct 2022 09:58) (136/69 - 150/77)  BP(mean): 94 (09 Oct 2022 08:20) (94 - 105)  RR: 18 (09 Oct 2022 09:58) (14 - 18)  SpO2: 94% (09 Oct 2022 09:58) (93% - 95%)    Parameters below as of 09 Oct 2022 09:58  Patient On (Oxygen Delivery Method): room air    I&O's Detail    08 Oct 2022 07:01  -  09 Oct 2022 07:00  --------------------------------------------------------  IN:    IV PiggyBack: 450 mL    Lactated Ringers: 2310 mL  Total IN: 2760 mL    OUT:    Bulb (mL): 115 mL    Ileostomy (mL): 10 mL    Indwelling Catheter - Urethral (mL): 2050 mL  Total OUT: 2175 mL    Total NET: 585 mL      09 Oct 2022 07:01  -  09 Oct 2022 11:25  --------------------------------------------------------  IN:    Lactated Ringers: 440 mL  Total IN: 440 mL    OUT:    Bulb (mL): 200 mL    Ileostomy (mL): 350 mL    Indwelling Catheter - Urethral (mL): 100 mL  Total OUT: 650 mL    Total NET: -210 mL    Physical Exam:  General: Resting comfortably in bed, NAD  HEENT: ATNC  Pulmonary: Nonlabored breathing, no respiratory distress, no accessory muscle use noted  Cardiovascular: NSR  Abdomen: Soft, nondistended, mild tenderness to palpation throughout without rebound or guarding. Ostomy noted in RLQ to be pink and patent with bowel sweat and bilious output. LLQ ROSA with SS output  Extremities: WWP, SCDs in place, No significant edema appreciated    LABS:                        9.5    12.49 )-----------( 294      ( 09 Oct 2022 05:30 )             29.9     10-09    136  |  98  |  13  ----------------------------<  58<L>  4.4   |  26  |  0.63    Ca    9.3      09 Oct 2022 05:30  Phos  3.2     10-09  Mg     1.9     10-09

## 2022-10-09 NOTE — PROGRESS NOTE ADULT - ASSESSMENT
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 Now s/p  exploratory laparotomy, segmental colon resection, resection of colovesicular fistula, partial cystectomy, bladder diverticulectomy, colorectal anastomosis, rigid sigmoidoscopy, loop ileostomy creation on 10/6. POD3 and pain is better controlled. Patient has not been out of bed.    NPO/IVF - LR @ 110   pain/nausea control PRN  cef/flagyl  Degroot catheter  Encourage patient to be OOB and ambulating  Step down to regional today  IS  SQH/SCDs

## 2022-10-10 ENCOUNTER — TRANSCRIPTION ENCOUNTER (OUTPATIENT)
Age: 67
End: 2022-10-10

## 2022-10-10 LAB
ANION GAP SERPL CALC-SCNC: 10 MMOL/L — SIGNIFICANT CHANGE UP (ref 5–17)
BUN SERPL-MCNC: 14 MG/DL — SIGNIFICANT CHANGE UP (ref 7–23)
CALCIUM SERPL-MCNC: 9 MG/DL — SIGNIFICANT CHANGE UP (ref 8.4–10.5)
CHLORIDE SERPL-SCNC: 99 MMOL/L — SIGNIFICANT CHANGE UP (ref 96–108)
CO2 SERPL-SCNC: 26 MMOL/L — SIGNIFICANT CHANGE UP (ref 22–31)
CREAT SERPL-MCNC: 0.56 MG/DL — SIGNIFICANT CHANGE UP (ref 0.5–1.3)
CULTURE RESULTS: SIGNIFICANT CHANGE UP
EGFR: 107 ML/MIN/1.73M2 — SIGNIFICANT CHANGE UP
GLUCOSE BLDC GLUCOMTR-MCNC: 129 MG/DL — HIGH (ref 70–99)
GLUCOSE SERPL-MCNC: 117 MG/DL — HIGH (ref 70–99)
HCT VFR BLD CALC: 27.8 % — LOW (ref 39–50)
HGB BLD-MCNC: 9 G/DL — LOW (ref 13–17)
MAGNESIUM SERPL-MCNC: 1.8 MG/DL — SIGNIFICANT CHANGE UP (ref 1.6–2.6)
MCHC RBC-ENTMCNC: 25.2 PG — LOW (ref 27–34)
MCHC RBC-ENTMCNC: 32.4 GM/DL — SIGNIFICANT CHANGE UP (ref 32–36)
MCV RBC AUTO: 77.9 FL — LOW (ref 80–100)
NRBC # BLD: 0 /100 WBCS — SIGNIFICANT CHANGE UP (ref 0–0)
ORGANISM # SPEC MICROSCOPIC CNT: SIGNIFICANT CHANGE UP
PHOSPHATE SERPL-MCNC: 2.8 MG/DL — SIGNIFICANT CHANGE UP (ref 2.5–4.5)
PLATELET # BLD AUTO: 323 K/UL — SIGNIFICANT CHANGE UP (ref 150–400)
POTASSIUM SERPL-MCNC: 3.7 MMOL/L — SIGNIFICANT CHANGE UP (ref 3.5–5.3)
POTASSIUM SERPL-SCNC: 3.7 MMOL/L — SIGNIFICANT CHANGE UP (ref 3.5–5.3)
RBC # BLD: 3.57 M/UL — LOW (ref 4.2–5.8)
RBC # FLD: 19.1 % — HIGH (ref 10.3–14.5)
SODIUM SERPL-SCNC: 135 MMOL/L — SIGNIFICANT CHANGE UP (ref 135–145)
SPECIMEN SOURCE: SIGNIFICANT CHANGE UP
WBC # BLD: 9.26 K/UL — SIGNIFICANT CHANGE UP (ref 3.8–10.5)
WBC # FLD AUTO: 9.26 K/UL — SIGNIFICANT CHANGE UP (ref 3.8–10.5)

## 2022-10-10 RX ORDER — SODIUM CHLORIDE 9 MG/ML
500 INJECTION, SOLUTION INTRAVENOUS ONCE
Refills: 0 | Status: COMPLETED | OUTPATIENT
Start: 2022-10-10 | End: 2022-10-10

## 2022-10-10 RX ORDER — MAGNESIUM SULFATE 500 MG/ML
2 VIAL (ML) INJECTION ONCE
Refills: 0 | Status: DISCONTINUED | OUTPATIENT
Start: 2022-10-10 | End: 2022-10-10

## 2022-10-10 RX ORDER — MAGNESIUM SULFATE 500 MG/ML
1 VIAL (ML) INJECTION ONCE
Refills: 0 | Status: DISCONTINUED | OUTPATIENT
Start: 2022-10-10 | End: 2022-10-10

## 2022-10-10 RX ORDER — POTASSIUM CHLORIDE 20 MEQ
20 PACKET (EA) ORAL ONCE
Refills: 0 | Status: COMPLETED | OUTPATIENT
Start: 2022-10-10 | End: 2022-10-10

## 2022-10-10 RX ORDER — IPRATROPIUM/ALBUTEROL SULFATE 18-103MCG
3 AEROSOL WITH ADAPTER (GRAM) INHALATION
Qty: 0 | Refills: 0 | DISCHARGE
Start: 2022-10-10

## 2022-10-10 RX ORDER — FAMOTIDINE 10 MG/ML
20 INJECTION INTRAVENOUS ONCE
Refills: 0 | Status: COMPLETED | OUTPATIENT
Start: 2022-10-10 | End: 2022-10-10

## 2022-10-10 RX ORDER — MAGNESIUM SULFATE 500 MG/ML
2 VIAL (ML) INJECTION ONCE
Refills: 0 | Status: COMPLETED | OUTPATIENT
Start: 2022-10-10 | End: 2022-10-10

## 2022-10-10 RX ORDER — POTASSIUM CHLORIDE 20 MEQ
10 PACKET (EA) ORAL ONCE
Refills: 0 | Status: DISCONTINUED | OUTPATIENT
Start: 2022-10-10 | End: 2022-10-10

## 2022-10-10 RX ADMIN — FAMOTIDINE 20 MILLIGRAM(S): 10 INJECTION INTRAVENOUS at 13:08

## 2022-10-10 RX ADMIN — Medication 400 MILLIGRAM(S): at 12:18

## 2022-10-10 RX ADMIN — HEPARIN SODIUM 5000 UNIT(S): 5000 INJECTION INTRAVENOUS; SUBCUTANEOUS at 13:09

## 2022-10-10 RX ADMIN — HYDROMORPHONE HYDROCHLORIDE 0.5 MILLIGRAM(S): 2 INJECTION INTRAMUSCULAR; INTRAVENOUS; SUBCUTANEOUS at 01:36

## 2022-10-10 RX ADMIN — Medication 20 MILLIEQUIVALENT(S): at 08:23

## 2022-10-10 RX ADMIN — HYDROMORPHONE HYDROCHLORIDE 0.5 MILLIGRAM(S): 2 INJECTION INTRAMUSCULAR; INTRAVENOUS; SUBCUTANEOUS at 02:36

## 2022-10-10 RX ADMIN — Medication 400 MILLIGRAM(S): at 05:38

## 2022-10-10 RX ADMIN — Medication 1000 MILLIGRAM(S): at 06:38

## 2022-10-10 RX ADMIN — Medication 400 MILLIGRAM(S): at 00:06

## 2022-10-10 RX ADMIN — SODIUM CHLORIDE 110 MILLILITER(S): 9 INJECTION, SOLUTION INTRAVENOUS at 05:44

## 2022-10-10 RX ADMIN — HEPARIN SODIUM 5000 UNIT(S): 5000 INJECTION INTRAVENOUS; SUBCUTANEOUS at 21:14

## 2022-10-10 RX ADMIN — Medication 1000 MILLIGRAM(S): at 12:57

## 2022-10-10 RX ADMIN — Medication 1000 MILLIGRAM(S): at 01:10

## 2022-10-10 RX ADMIN — Medication 25 GRAM(S): at 07:22

## 2022-10-10 RX ADMIN — Medication 100 MILLIGRAM(S): at 13:08

## 2022-10-10 RX ADMIN — CEFTRIAXONE 100 MILLIGRAM(S): 500 INJECTION, POWDER, FOR SOLUTION INTRAMUSCULAR; INTRAVENOUS at 14:12

## 2022-10-10 RX ADMIN — Medication 100 MILLIGRAM(S): at 05:11

## 2022-10-10 RX ADMIN — HEPARIN SODIUM 5000 UNIT(S): 5000 INJECTION INTRAVENOUS; SUBCUTANEOUS at 05:43

## 2022-10-10 RX ADMIN — Medication 100 MILLIGRAM(S): at 21:13

## 2022-10-10 RX ADMIN — SODIUM CHLORIDE 1000 MILLILITER(S): 9 INJECTION, SOLUTION INTRAVENOUS at 17:09

## 2022-10-10 RX ADMIN — Medication 62.5 MILLIMOLE(S): at 10:07

## 2022-10-10 RX ADMIN — CHLORHEXIDINE GLUCONATE 1 APPLICATION(S): 213 SOLUTION TOPICAL at 06:51

## 2022-10-10 RX ADMIN — SODIUM CHLORIDE 110 MILLILITER(S): 9 INJECTION, SOLUTION INTRAVENOUS at 21:13

## 2022-10-10 RX ADMIN — Medication 20 MILLIEQUIVALENT(S): at 07:22

## 2022-10-10 RX ADMIN — Medication 3 MILLILITER(S): at 00:05

## 2022-10-10 NOTE — DISCHARGE NOTE PROVIDER - NSDCMRMEDTOKEN_GEN_ALL_CORE_FT
Albuterol (Eqv-ProAir HFA) 90 mcg/inh inhalation aerosol: inhaled prn, As Needed  ipratropium-albuterol 0.5 mg-2.5 mg/3 mL inhalation solution: 3 milliliter(s) inhaled every 6 hours, As needed, Shortness of Breath and/or Wheezing  simvastatin 40 mg oral tablet: 1 tab(s) orally once a day (at bedtime)   Albuterol (Eqv-ProAir HFA) 90 mcg/inh inhalation aerosol: inhaled prn, As Needed  Cipro 500 mg oral tablet: 1 tab(s) orally every 12 hours MDD:2 tablets  ipratropium-albuterol 0.5 mg-2.5 mg/3 mL inhalation solution: 3 milliliter(s) inhaled every 6 hours, As needed, Shortness of Breath and/or Wheezing  loperamide 2 mg oral capsule: 1 cap(s) orally 3 times a day   simvastatin 40 mg oral tablet: 1 tab(s) orally once a day (at bedtime)   Albuterol (Eqv-ProAir HFA) 90 mcg/inh inhalation aerosol: inhaled prn, As Needed  Cipro 500 mg oral tablet: 1 tab(s) orally 2 times a day   Imodium 2 mg oral capsule: 1 cap(s) orally 3 times a day   ipratropium-albuterol 0.5 mg-2.5 mg/3 mL inhalation solution: 3 milliliter(s) inhaled every 6 hours, As needed, Shortness of Breath and/or Wheezing  simvastatin 40 mg oral tablet: 1 tab(s) orally once a day (at bedtime)

## 2022-10-10 NOTE — PROGRESS NOTE ADULT - ASSESSMENT
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 Now s/p  exploratory laparotomy, segmental colon resection, resection of colovesicular fistula, partial cystectomy, bladder diverticulectomy, colorectal anastomosis, rigid sigmoidoscopy, loop ileostomy creation on 10/6. POD4 and pain is controlled.     NPO/IVF - D5 1/2NS @ 110cc/hr  pain/nausea control PRN  cef/flagyl  Degroot catheter  Encourage patient to be OOB and ambulating  Ostomy care  ROSA drain   IS  SQH/SCDs

## 2022-10-10 NOTE — DISCHARGE NOTE PROVIDER - DETAILS OF MALNUTRITION DIAGNOSIS/DIAGNOSES
This patient has been assessed with a concern for Malnutrition and was treated during this hospitalization for the following Nutrition diagnosis/diagnoses:     -  10/11/2022: Severe protein-calorie malnutrition

## 2022-10-10 NOTE — DISCHARGE NOTE PROVIDER - HOSPITAL COURSE
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 She underwent an exploratory laparotomy, segmental colon resection, resection of colovesicular fistula, partial cystectomy, bladder diverticulectomy, colorectal anastomosis, rigid sigmoidoscopy, loop ileostomy creation on the day of admission (10/6/22). Patient tolerated the procedure well, was placed on tele floor for cardiodynamic monitoring and stepped down on 10/9.  Patient was placed on antibiotics to treat for Staphylcoccus epidermis and Enterococcus faecium that was positive on a urine culture. Yepez (14 Setswana) was placed on 10/6 and remained in place due to colovesicular fistula repair. The rest of the patients hospital course was unremarkable. His pain was well controlled, he was able to tolerate his diet without nausea or vomiting, functional yepez catheter, good output and gas from ostomy bag, Patient was able to ambulate without assistance. At the time of discharge, patient was afebrile, hemodynamically stable, and deemed clinically fit for discharge home with an indwelling yepez catheter, ROSA drain in LLQ, and ostomy bag  with VNS. Patient recieved ostomy teaching on 10/10. Patient  f/u with Dr. Torre in 2 weeks in office to remove yepez. 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 She underwent an exploratory laparotomy, segmental colon resection, resection of colovesicular fistula, partial cystectomy, bladder diverticulectomy, colorectal anastomosis, rigid sigmoidoscopy, loop ileostomy creation on the day of admission (10/6/22). Patient tolerated the procedure well, was placed on tele floor for cardiodynamic monitoring and stepped down on 10/9.  Patient was placed on antibiotics to treat for Staphylcoccus epidermis and Enterococcus faecium that was positive on a urine culture. Yepez (14 Serbian) was placed on 10/6 and remained in place due to colovesicular fistula repair. The rest of the patients hospital course was unremarkable. His pain was well controlled, he was able to tolerate his diet without nausea or vomiting, functional yepez catheter, good output and gas from ostomy bag, Patient was able to ambulate without assistance. On POD5, patient was noted to have change in quality and quantity of output out of ROSA drain. ROSA Cr at that time was found to be 66, indicating likely bladder leak. Urology recommended no intervention and no imaging given patient's benign condition. At the time of discharge, patient was afebrile, hemodynamically stable, and deemed clinically fit for discharge home with an indwelling yepez catheter, ROSA drain in LLQ, and ostomy bag  with VNS. Patient recieved ostomy teaching on 10/10. Patient  f/u with Dr. Torre in 2 weeks in office to remove yepez. Pt will follow up with Dr. Zhang in 1-2 weeks. 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 She underwent an exploratory laparotomy, segmental colon resection, resection of colovesicular fistula, partial cystectomy, bladder diverticulectomy, colorectal anastomosis, rigid sigmoidoscopy, loop ileostomy creation on the day of admission (10/6/22). Patient tolerated the procedure well, was placed on tele floor for cardiodynamic monitoring and stepped down on 10/9.  Patient was placed on antibiotics to treat for Staphylcoccus epidermis and Enterococcus faecium that was positive on a urine culture. Yepez (14 Frisian) was placed on 10/6 and remained in place due to colovesicular fistula repair. The rest of the patients hospital course was unremarkable. His pain was well controlled, he was able to tolerate his diet without nausea or vomiting, functional yepez catheter, good output and gas from ostomy bag, Patient was able to ambulate without assistance. On POD5, patient was noted to have change in quality and quantity of output out of ROSA drain. ROSA Cr at that time was found to be 66, indicating likely bladder leak. Urology recommended no intervention and no imaging given patient's benign condition. At the time of discharge, patient was afebrile, hemodynamically stable, and deemed clinically fit for discharge home with an indwelling yepez catheter, ROSA drain in LLQ, and ostomy bag  with VNS. Patient recieved ostomy teaching on 10/10. Patient  f/u with Dr. Torre in 2 weeks in office to remove yepez. Pt will follow up with Dr. Zhang in next Wednesday,

## 2022-10-10 NOTE — DISCHARGE NOTE PROVIDER - NSDCCPTREATMENT_GEN_ALL_CORE_FT
PRINCIPAL PROCEDURE  Procedure: Exploratory laparotomy  Findings and Treatment:       SECONDARY PROCEDURE  Procedure: Closure, fistula, enterovesical, with cystectomy  Findings and Treatment:     Procedure: Exploratory laparotomy  Findings and Treatment:     Procedure: Creation, ileostomy  Findings and Treatment:

## 2022-10-10 NOTE — DISCHARGE NOTE PROVIDER - CARE PROVIDER_API CALL
Moreno Zhang  COLON/RECTAL SURGERY  [address], [address]  [place] [address]  Phone: [phone]  Fax: [phone]  Follow Up Time:     Umesh Torre)  Urology  [address], [place] [address]  Phone: [phone]  Fax: [phone]  Follow Up Time: 2 weeks

## 2022-10-10 NOTE — DISCHARGE NOTE PROVIDER - NSDCFUADDINST_GEN_ALL_CORE_FT
Please schedule a follow with Dr. Zhang within 2 weeks, to make an appointment please call his office at [phone]. You are advised to followup with your urologist Dr. Torre in 2 weeks to remove the yepez, to make an appointment call [phone]. Followup with your primary care provider for clearance to resume your antihypertensive medications.     General Discharge Instructions:  Please resume all regular home medications unless specifically advised not to take a particular medication. Also, please take any new medications as prescribed.  Please get plenty of rest, continue to ambulate several times per day, and drink adequate amounts of fluids. Avoid lifting weights greater than 5-10 lbs until you follow-up with your surgeon, who will instruct you further regarding activity restrictions.  Avoid driving or operating heavy machinery while taking pain medications.  Please follow-up with your surgeon and Primary Care Provider (PCP) as advised.  Incision Care:  *Please call your doctor or nurse practitioner if you have increased pain, swelling, redness, or drainage from the incision site.  *Avoid swimming and baths until your follow-up appointment.  *You may shower, and wash surgical incisions with a mild soap and warm water. Gently pat the area dry.  *If you have staples, they will be removed at your follow-up appointment.  *If you have steri-strips, they will fall off on their own. Please remove any remaining strips 7-10 days after surgery.  Warning Signs:  Please call your doctor or nurse practitioner if you experience the following:  *You experience new chest pain, pressure, squeezing or tightness.  *New or worsening cough, shortness of breath, or wheeze.  *If you are vomiting and cannot keep down fluids or your medications.  *You are getting dehydrated due to continued vomiting, diarrhea, or other reasons. Signs of dehydration include dry mouth, rapid heartbeat, or feeling dizzy or faint when standing.  *You see blood or dark/black material when you vomit or have a bowel movement.  *You experience burning when you urinate, have blood in your urine, or experience a discharge.  *Your pain is not improving within 8-12 hours or is not gone within 24 hours. Call or return immediately if your pain is getting worse, changes location, or moves to your chest or back.  *You have shaking chills, or fever greater than 101.5 degrees Fahrenheit or 38 degrees Celsius.  *Any change in your symptoms, or any new symptoms that concern you.    - Keep the drainage bag below the level of your bladder and off the floor at all times.  - Keep the catheter secured to your thigh to prevent it from moving.  - Don’t lie on your catheter or block the flow of urine in the tubing.  - Shower daily to keep the catheter clean.  - Clean your hands before and after touching the catheter or bag.  Keep well hydrated. Replace fluid loss from ostomy daily. Avoid only drinking plain water. Include Gatorade and/or other vitamin drinks to replace fluid. Try to maintain ostomy output between 1000mL to 1500mL per day. If Ostomy output >1 liter, take 4mg of Imodium, repeat 2mg with each episode of loose stool. Do not exceed 16mg/24 hours.  Continue ROSA drain care as instructed. (Empty and record the ROSA drainage twice daily after discharge. Also, strip/milk the drain tubing each time to minimize clogging. Bring the recorded drain amounts to the office so that it can be reviewed by the physician.)    Showers are permitted the day of discharge. The drains and the incision lines can get wet in the shower. Do not take a bath. Pin the drains to a bathrobe belt or string or a small towel draped over your neck in order that the drains do not dangle from your skin while in the shower. Keep incision sites and ROSA drain sites clean & dry after showering.  Pain control:  Please take 2 tabs of extra strength Tylenol every 6 hours as needed for pain. DO NOT exceed 4000 mg per day. You have been prescribed percocet for pain not controlled by Tylenol. Take 1 tab every 6 hours as needed for severe pain INSTEAD of Tylenol. Please do not exceed 4 tabs per day. Take prescribed stool softener (colace) to prevent constipation caused by percocet.  Please continue a LOW-FIBER DIET.  a) Vegetables should initially be cooked (baked, steamed, blanched, etc)  b) May want to start with peeled/canned fruit  c) Limit fat/oil intake and fried/greasy foods  d) Add small amounts of fiber back into diet every couple of days   Please schedule a follow with Dr. Zhang within 2 weeks, to make an appointment please call his office at [phone].     You are advised to followup with your urologist Dr. Torre on Monday. Please call to make an appointment call [phone]. At that Monday (10/17) appointment, please ensure you receive requisition for blood work (complete blood count and basic metabolic panel) to check electrolytes and kidney function. Dr. Torre is aware. Please ensure this is done.  Please follow up with your primary care provider for clearance to resume your antihypertensive medications.     General Discharge Instructions:  Please resume all regular home medications unless specifically advised not to take a particular medication. Also, please take any new medications as prescribed.  You have been prescribed Ciprofloxacin to take 2 times a day for 7 days. You are also prescribed Loperamide to help diminish ostomy output (3 times a day for 7 days). Please ensure you take these medications as prescribed. Please take Tylenol as needed for pain.  Please get plenty of rest, continue to ambulate several times per day, and drink adequate amounts of fluids. Avoid lifting weights greater than 5-10 lbs until you follow-up with your surgeon, who will instruct you further regarding activity restrictions.  Avoid driving or operating heavy machinery while taking pain medications.  Please follow-up with your surgeon and Primary Care Provider (PCP) as advised.  Incision Care:  *Please call your doctor or nurse practitioner if you have increased pain, swelling, redness, or drainage from the incision site.  *Avoid swimming and baths until your follow-up appointment.  *You may shower, and wash surgical incisions with a mild soap and warm water. Gently pat the area dry.  *If you have staples, they will be removed at your follow-up appointment.  *If you have steri-strips, they will fall off on their own. Please remove any remaining strips 7-10 days after surgery.  Warning Signs:  Please call your doctor or nurse practitioner if you experience the following:  *You experience new chest pain, pressure, squeezing or tightness.  *New or worsening cough, shortness of breath, or wheeze.  *If you are vomiting and cannot keep down fluids or your medications.  *You are getting dehydrated due to continued vomiting, diarrhea, or other reasons. Signs of dehydration include dry mouth, rapid heartbeat, or feeling dizzy or faint when standing.  *You see blood or dark/black material when you vomit or have a bowel movement.  *You experience burning when you urinate, have blood in your urine, or experience a discharge.  *Your pain is not improving within 8-12 hours or is not gone within 24 hours. Call or return immediately if your pain is getting worse, changes location, or moves to your chest or back.  *You have shaking chills, or fever greater than 101.5 degrees Fahrenheit or 38 degrees Celsius.  *Any change in your symptoms, or any new symptoms that concern you.    - Keep the drainage bag below the level of your bladder and off the floor at all times.  - Keep the catheter secured to your thigh to prevent it from moving.  - Don’t lie on your catheter or block the flow of urine in the tubing.  - Shower daily to keep the catheter clean.  - Clean your hands before and after touching the catheter or bag.  Keep well hydrated. Replace fluid loss from ostomy daily. Avoid only drinking plain water. Include Gatorade and/or other vitamin drinks to replace fluid. Try to maintain ostomy output between 1000mL to 1500mL per day. If Ostomy output >1 liter, take 4mg of Imodium, repeat 2mg with each episode of loose stool. Do not exceed 16mg/24 hours.  Continue ROSA drain care as instructed. (Empty and record the ROSA drainage twice daily after discharge. Also, strip/milk the drain tubing each time to minimize clogging. Bring the recorded drain amounts to the office so that it can be reviewed by the physician.)    Showers are permitted the day of discharge. The drains and the incision lines can get wet in the shower. Do not take a bath. Pin the drains to a bathrobe belt or string or a small towel draped over your neck in order that the drains do not dangle from your skin while in the shower. Keep incision sites and ROSA drain sites clean & dry after showering.  Pain control:  Please take 2 tabs of extra strength Tylenol every 6 hours as needed for pain. DO NOT exceed 4000 mg per day. You have been prescribed percocet for pain not controlled by Tylenol. Take 1 tab every 6 hours as needed for severe pain INSTEAD of Tylenol. Please do not exceed 4 tabs per day. Take prescribed stool softener (colace) to prevent constipation caused by percocet.  Please continue a LOW-FIBER DIET.  a) Vegetables should initially be cooked (baked, steamed, blanched, etc)  b) May want to start with peeled/canned fruit  c) Limit fat/oil intake and fried/greasy foods  d) Add small amounts of fiber back into diet every couple of days Please schedule a follow with Dr. Zhang next Wednesday, to make an appointment please call his office at [phone].     You are advised to followup with your urologist Dr. Torre on Monday. Please call to make an appointment call [phone]. At that Monday (10/17) appointment, please ensure you receive requisition for blood work (complete blood count and basic metabolic panel) to check electrolytes and kidney function. Dr. Torre is aware. Please ensure this is done.  Please follow up with your primary care provider for clearance to resume your antihypertensive medications.     General Discharge Instructions:  Please resume all regular home medications unless specifically advised not to take a particular medication. Also, please take any new medications as prescribed.  You have been prescribed Ciprofloxacin to take 2 times a day for 7 days. You are also prescribed Loperamide to help diminish ostomy output (3 times a day for 7 days). Please ensure you take these medications as prescribed. Please take Tylenol as needed for pain.  Please get plenty of rest, continue to ambulate several times per day, and drink adequate amounts of fluids. Avoid lifting weights greater than 5-10 lbs until you follow-up with your surgeon, who will instruct you further regarding activity restrictions.  Avoid driving or operating heavy machinery while taking pain medications.  Please follow-up with your surgeon and Primary Care Provider (PCP) as advised.  Incision Care:  *Please call your doctor or nurse practitioner if you have increased pain, swelling, redness, or drainage from the incision site.  *Avoid swimming and baths until your follow-up appointment.  *You may shower, and wash surgical incisions with a mild soap and warm water. Gently pat the area dry.  *If you have staples, they will be removed at your follow-up appointment.  *If you have steri-strips, they will fall off on their own. Please remove any remaining strips 7-10 days after surgery.  Warning Signs:  Please call your doctor or nurse practitioner if you experience the following:  *You experience new chest pain, pressure, squeezing or tightness.  *New or worsening cough, shortness of breath, or wheeze.  *If you are vomiting and cannot keep down fluids or your medications.  *You are getting dehydrated due to continued vomiting, diarrhea, or other reasons. Signs of dehydration include dry mouth, rapid heartbeat, or feeling dizzy or faint when standing.  *You see blood or dark/black material when you vomit or have a bowel movement.  *You experience burning when you urinate, have blood in your urine, or experience a discharge.  *Your pain is not improving within 8-12 hours or is not gone within 24 hours. Call or return immediately if your pain is getting worse, changes location, or moves to your chest or back.  *You have shaking chills, or fever greater than 101.5 degrees Fahrenheit or 38 degrees Celsius.  *Any change in your symptoms, or any new symptoms that concern you.    - Keep the drainage bag below the level of your bladder and off the floor at all times.  - Keep the catheter secured to your thigh to prevent it from moving.  - Don’t lie on your catheter or block the flow of urine in the tubing.  - Shower daily to keep the catheter clean.  - Clean your hands before and after touching the catheter or bag.  Keep well hydrated. Replace fluid loss from ostomy daily. Avoid only drinking plain water. Include Gatorade and/or other vitamin drinks to replace fluid. Try to maintain ostomy output between 1000mL to 1500mL per day. If Ostomy output >1 liter, take 4mg of Imodium, repeat 2mg with each episode of loose stool. Do not exceed 16mg/24 hours.  Continue ROSA drain care as instructed. (Empty and record the ROSA drainage twice daily after discharge. Also, strip/milk the drain tubing each time to minimize clogging. Bring the recorded drain amounts to the office so that it can be reviewed by the physician.)    Showers are permitted the day of discharge. The drains and the incision lines can get wet in the shower. Do not take a bath. Pin the drains to a bathrobe belt or string or a small towel draped over your neck in order that the drains do not dangle from your skin while in the shower. Keep incision sites and ROSA drain sites clean & dry after showering.  Pain control:  Please take 2 tabs of extra strength Tylenol every 6 hours as needed for pain. DO NOT exceed 4000 mg per day. You have been prescribed percocet for pain not controlled by Tylenol. Take 1 tab every 6 hours as needed for severe pain INSTEAD of Tylenol. Please do not exceed 4 tabs per day. Take prescribed stool softener (colace) to prevent constipation caused by percocet.  Please continue a LOW-FIBER DIET.  a) Vegetables should initially be cooked (baked, steamed, blanched, etc)  b) May want to start with peeled/canned fruit  c) Limit fat/oil intake and fried/greasy foods  d) Add small amounts of fiber back into diet every couple of days    Please measure output of ostomy and ROSA obdulia daily

## 2022-10-10 NOTE — PROGRESS NOTE ADULT - SUBJECTIVE AND OBJECTIVE BOX
STATUS POST: Exploratory laparotomy, Closure, fistula, enterovesical, with cystectomy, Colon resection     POD: #4     SUBJECTIVE: Patient seen and examined bedside by chief resident, no acute events ON. Pain well controlled, tolerating yepez. -N/-V. Gas and green bilious fluid in Ostomy bag. ROSA in place. Denies CP, SOB, ESTRADA, fever.     cefTRIAXone   IVPB 1000 milliGRAM(s) IV Intermittent every 24 hours  heparin   Injectable 5000 Unit(s) SubCutaneous every 8 hours  metroNIDAZOLE  IVPB 500 milliGRAM(s) IV Intermittent every 8 hours      Vital Signs Last 24 Hrs  T(C): 37.4 (10 Oct 2022 05:01), Max: 37.4 (10 Oct 2022 05:01)  T(F): 99.4 (10 Oct 2022 05:01), Max: 99.4 (10 Oct 2022 05:01)  HR: 69 (10 Oct 2022 05:01) (69 - 79)  BP: 144/80 (10 Oct 2022 05:01) (137/77 - 155/77)  BP(mean): --  RR: 17 (10 Oct 2022 05:01) (17 - 19)  SpO2: 95% (10 Oct 2022 05:01) (92% - 95%)    Parameters below as of 10 Oct 2022 05:01  Patient On (Oxygen Delivery Method): room air      I&O's Detail    09 Oct 2022 07:01  -  10 Oct 2022 07:00  --------------------------------------------------------  IN:    dextrose 5% + sodium chloride 0.45%: 1430 mL    IV PiggyBack: 100 mL    IV PiggyBack: 50 mL    IV PiggyBack: 200 mL    Lactated Ringers: 1210 mL    Lactated Ringers Bolus: 500 mL  Total IN: 3490 mL    OUT:    Bulb (mL): 750 mL    Ileostomy (mL): 2180 mL    Indwelling Catheter - Urethral (mL): 825 mL  Total OUT: 3755 mL    Total NET: -265 mL      10 Oct 2022 07:01  -  10 Oct 2022 08:31  --------------------------------------------------------  IN:  Total IN: 0 mL    OUT:    Bulb (mL): 100 mL    Ileostomy (mL): 110 mL  Total OUT: 210 mL    Total NET: -210 mL          General: NAD, resting comfortably in bed  C/V: NSR  Pulm: Nonlabored breathing, no respiratory distress  Abd: soft, ND, appropriately TTP near incision site. No guarding or rebound tenderness  Ostomy: RLQ +gas, P/P/P, +green bilious fluid  Incisions: C/D/I  ROSA: LLQ SS fluid  Yepez: Yellow urine  Extrem: WWP, no edema,         LABS:                        9.0    9.26  )-----------( 323      ( 10 Oct 2022 05:30 )             27.8     10-10    135  |  99  |  14  ----------------------------<  117<H>  3.7   |  26  |  0.56    Ca    9.0      10 Oct 2022 05:30  Phos  2.8     10-10  Mg     1.8     10-10            RADIOLOGY & ADDITIONAL STUDIES:

## 2022-10-10 NOTE — DISCHARGE NOTE PROVIDER - NSDCCPCAREPLAN_GEN_ALL_CORE_FT
PRINCIPAL DISCHARGE DIAGNOSIS  Diagnosis: Colovesical fistula  Assessment and Plan of Treatment:        PRINCIPAL DISCHARGE DIAGNOSIS  Diagnosis: Colovesical fistula  Assessment and Plan of Treatment:       SECONDARY DISCHARGE DIAGNOSES  Diagnosis: History of colon cancer  Assessment and Plan of Treatment:     Diagnosis: History of diverticulitis  Assessment and Plan of Treatment:     Diagnosis: HTN (hypertension)  Assessment and Plan of Treatment:     Diagnosis: HLD (hyperlipidemia)  Assessment and Plan of Treatment:

## 2022-10-10 NOTE — DISCHARGE NOTE PROVIDER - NSDCFUADDAPPT_GEN_ALL_CORE_FT
Please followup with your primary care provider for clearance to continue at home blood pressure medication. Please followup with your primary care provider for clearance to continue at home blood pressure medication.    Please follow up with Dr. Zhang in 1-2 weeks for follow up appointment.    Please follow up with Dr. Torre in 2 weeks to assess for yepez catheter removal. Please followup with your primary care provider within 1 week of discharge for clearance to continue at home blood pressure medication.    Please follow up with Dr. Zhang in 1-2 weeks for follow up appointment.    Please follow up with Dr. Torre in his office on Monday (10/17). You will receive blood work at that appointment and assess drainage of yepez catheter and abdominal drain.

## 2022-10-11 LAB
ANION GAP SERPL CALC-SCNC: 7 MMOL/L — SIGNIFICANT CHANGE UP (ref 5–17)
BUN SERPL-MCNC: 10 MG/DL — SIGNIFICANT CHANGE UP (ref 7–23)
CALCIUM SERPL-MCNC: 8.8 MG/DL — SIGNIFICANT CHANGE UP (ref 8.4–10.5)
CHLORIDE SERPL-SCNC: 100 MMOL/L — SIGNIFICANT CHANGE UP (ref 96–108)
CO2 SERPL-SCNC: 27 MMOL/L — SIGNIFICANT CHANGE UP (ref 22–31)
CREAT FLD-MCNC: 65.9 MG/DL — SIGNIFICANT CHANGE UP
CREAT SERPL-MCNC: 0.5 MG/DL — SIGNIFICANT CHANGE UP (ref 0.5–1.3)
EGFR: 111 ML/MIN/1.73M2 — SIGNIFICANT CHANGE UP
GLUCOSE BLDC GLUCOMTR-MCNC: 182 MG/DL — HIGH (ref 70–99)
GLUCOSE SERPL-MCNC: 120 MG/DL — HIGH (ref 70–99)
HCT VFR BLD CALC: 28.6 % — LOW (ref 39–50)
HGB BLD-MCNC: 9.1 G/DL — LOW (ref 13–17)
MAGNESIUM SERPL-MCNC: 1.9 MG/DL — SIGNIFICANT CHANGE UP (ref 1.6–2.6)
MCHC RBC-ENTMCNC: 25.2 PG — LOW (ref 27–34)
MCHC RBC-ENTMCNC: 31.8 GM/DL — LOW (ref 32–36)
MCV RBC AUTO: 79.2 FL — LOW (ref 80–100)
NRBC # BLD: 0 /100 WBCS — SIGNIFICANT CHANGE UP (ref 0–0)
PHOSPHATE SERPL-MCNC: 2.4 MG/DL — LOW (ref 2.5–4.5)
PLATELET # BLD AUTO: 315 K/UL — SIGNIFICANT CHANGE UP (ref 150–400)
POTASSIUM SERPL-MCNC: 3.6 MMOL/L — SIGNIFICANT CHANGE UP (ref 3.5–5.3)
POTASSIUM SERPL-SCNC: 3.6 MMOL/L — SIGNIFICANT CHANGE UP (ref 3.5–5.3)
RBC # BLD: 3.61 M/UL — LOW (ref 4.2–5.8)
RBC # FLD: 18.8 % — HIGH (ref 10.3–14.5)
SODIUM SERPL-SCNC: 134 MMOL/L — LOW (ref 135–145)
WBC # BLD: 7.68 K/UL — SIGNIFICANT CHANGE UP (ref 3.8–10.5)
WBC # FLD AUTO: 7.68 K/UL — SIGNIFICANT CHANGE UP (ref 3.8–10.5)

## 2022-10-11 RX ORDER — PSYLLIUM SEED (WITH DEXTROSE)
1 POWDER (GRAM) ORAL DAILY
Refills: 0 | Status: DISCONTINUED | OUTPATIENT
Start: 2022-10-11 | End: 2022-10-14

## 2022-10-11 RX ORDER — LOPERAMIDE HCL 2 MG
2 TABLET ORAL
Refills: 0 | Status: DISCONTINUED | OUTPATIENT
Start: 2022-10-11 | End: 2022-10-14

## 2022-10-11 RX ORDER — SIMVASTATIN 20 MG/1
40 TABLET, FILM COATED ORAL AT BEDTIME
Refills: 0 | Status: DISCONTINUED | OUTPATIENT
Start: 2022-10-11 | End: 2022-10-14

## 2022-10-11 RX ORDER — OXYCODONE HYDROCHLORIDE 5 MG/1
5 TABLET ORAL EVERY 6 HOURS
Refills: 0 | Status: DISCONTINUED | OUTPATIENT
Start: 2022-10-11 | End: 2022-10-14

## 2022-10-11 RX ORDER — POTASSIUM PHOSPHATE, MONOBASIC POTASSIUM PHOSPHATE, DIBASIC 236; 224 MG/ML; MG/ML
15 INJECTION, SOLUTION INTRAVENOUS ONCE
Refills: 0 | Status: COMPLETED | OUTPATIENT
Start: 2022-10-11 | End: 2022-10-11

## 2022-10-11 RX ORDER — ACETAMINOPHEN 500 MG
650 TABLET ORAL EVERY 6 HOURS
Refills: 0 | Status: DISCONTINUED | OUTPATIENT
Start: 2022-10-11 | End: 2022-10-14

## 2022-10-11 RX ORDER — SODIUM CHLORIDE 9 MG/ML
500 INJECTION, SOLUTION INTRAVENOUS ONCE
Refills: 0 | Status: COMPLETED | OUTPATIENT
Start: 2022-10-11 | End: 2022-10-11

## 2022-10-11 RX ADMIN — Medication 100 MILLIGRAM(S): at 21:23

## 2022-10-11 RX ADMIN — HEPARIN SODIUM 5000 UNIT(S): 5000 INJECTION INTRAVENOUS; SUBCUTANEOUS at 05:35

## 2022-10-11 RX ADMIN — Medication 400 MILLIGRAM(S): at 06:36

## 2022-10-11 RX ADMIN — SODIUM CHLORIDE 110 MILLILITER(S): 9 INJECTION, SOLUTION INTRAVENOUS at 06:36

## 2022-10-11 RX ADMIN — SODIUM CHLORIDE 110 MILLILITER(S): 9 INJECTION, SOLUTION INTRAVENOUS at 21:25

## 2022-10-11 RX ADMIN — CEFTRIAXONE 100 MILLIGRAM(S): 500 INJECTION, POWDER, FOR SOLUTION INTRAMUSCULAR; INTRAVENOUS at 13:07

## 2022-10-11 RX ADMIN — SIMVASTATIN 40 MILLIGRAM(S): 20 TABLET, FILM COATED ORAL at 21:22

## 2022-10-11 RX ADMIN — Medication 100 MILLIGRAM(S): at 13:39

## 2022-10-11 RX ADMIN — POTASSIUM PHOSPHATE, MONOBASIC POTASSIUM PHOSPHATE, DIBASIC 62.5 MILLIMOLE(S): 236; 224 INJECTION, SOLUTION INTRAVENOUS at 09:19

## 2022-10-11 RX ADMIN — SODIUM CHLORIDE 1000 MILLILITER(S): 9 INJECTION, SOLUTION INTRAVENOUS at 05:05

## 2022-10-11 RX ADMIN — Medication 100 MILLIGRAM(S): at 05:35

## 2022-10-11 RX ADMIN — Medication 2 MILLIGRAM(S): at 17:01

## 2022-10-11 RX ADMIN — HEPARIN SODIUM 5000 UNIT(S): 5000 INJECTION INTRAVENOUS; SUBCUTANEOUS at 13:07

## 2022-10-11 RX ADMIN — Medication 1000 MILLIGRAM(S): at 07:14

## 2022-10-11 RX ADMIN — Medication 2 MILLIGRAM(S): at 07:36

## 2022-10-11 RX ADMIN — CHLORHEXIDINE GLUCONATE 1 APPLICATION(S): 213 SOLUTION TOPICAL at 05:36

## 2022-10-11 RX ADMIN — Medication 1 PACKET(S): at 07:36

## 2022-10-11 RX ADMIN — HEPARIN SODIUM 5000 UNIT(S): 5000 INJECTION INTRAVENOUS; SUBCUTANEOUS at 21:22

## 2022-10-11 NOTE — PHYSICAL THERAPY INITIAL EVALUATION ADULT - PERTINENT HX OF CURRENT PROBLEM, REHAB EVAL
67M PMHx HTN, HLD, asthma, colon cancer s/p laparoscopic right colectomy (2016), chronic diverticulitis with known colovesicular fistula since July 2022, recently admitted (9/19-26) w/ chronic cystitis and bacteremia treated with IV abx, who presents today for laparoscopic resection of colovesicular fistula. 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 Pt has no acute complaints today. Will proceed to OR as planned.

## 2022-10-11 NOTE — DIETITIAN NUTRITION RISK NOTIFICATION - TREATMENT: THE FOLLOWING DIET HAS BEEN RECOMMENDED
Diet, Low Fiber:   Supplement Feeding Modality:  Oral  Ensure Plus High Protein Cans or Servings Per Day:  2       Frequency:  Daily (10-11-22 @ 11:52) [Available for Activation]  Diet, Low Fiber (10-10-22 @ 15:08) [Active]    1. Continue with low fiber diet, rec include Ensure Plus High Protein BID (700 kcal, 40g protein, 360 mL free H2O)  2. Monitor %PO intake, encourage as able, honor pt food preferences as able  3. BM and pain regimen per team  4. Monitor BMP, BG, POCT, renal indices, LFTs, lytes, replete prn  5. Diet edu prn

## 2022-10-11 NOTE — DIETITIAN INITIAL EVALUATION ADULT - ADD RECOMMEND
1. Continue with low fiber diet, rec include Ensure Plus High Protein BID (700 kcal, 40g protein, 360 mL free H2O)  2. Monitor %PO intake, encourage as able, honor pt food preferences as able  3. BM and pain regimen per team  4. Monitor BMP, BG, POCT, renal indices, LFTs, lytes, replete prn  5. Diet edu prn

## 2022-10-11 NOTE — PROGRESS NOTE ADULT - SUBJECTIVE AND OBJECTIVE BOX
UROLOGY PROGRESS NOTE    SUBJECTIVE: Patient seen and examined bedside. Patient denies fevers/chills, nausea/vomiting, and pain is controlled. Tolerating yepez. ROSA cr elevated    cefTRIAXone   IVPB 1000 milliGRAM(s) IV Intermittent every 24 hours  heparin   Injectable 5000 Unit(s) SubCutaneous every 8 hours  metroNIDAZOLE  IVPB 500 milliGRAM(s) IV Intermittent every 8 hours      Vital Signs Last 24 Hrs  T(C): 36.8 (11 Oct 2022 16:46), Max: 37.1 (10 Oct 2022 20:27)  T(F): 98.2 (11 Oct 2022 16:46), Max: 98.8 (10 Oct 2022 20:27)  HR: 64 (11 Oct 2022 16:46) (59 - 70)  BP: 144/75 (11 Oct 2022 16:46) (129/78 - 152/83)  BP(mean): --  RR: 17 (11 Oct 2022 16:46) (17 - 18)  SpO2: 96% (11 Oct 2022 16:46) (94% - 96%)    Parameters below as of 11 Oct 2022 16:46  Patient On (Oxygen Delivery Method): room air      I&O's Detail    10 Oct 2022 07:01  -  11 Oct 2022 07:00  --------------------------------------------------------  IN:    dextrose 5% + sodium chloride 0.45%: 2172.5 mL    IV PiggyBack: 300 mL    IV PiggyBack: 258 mL    IV PiggyBack: 50 mL    IV PiggyBack: 200 mL    Lactated Ringers Bolus: 1000 mL    Oral Fluid: 1090 mL  Total IN: 5070.5 mL    OUT:    Bulb (mL): 807.5 mL    Ileostomy (mL): 2595 mL    Indwelling Catheter - Urethral (mL): 625 mL  Total OUT: 4027.5 mL    Total NET: 1043 mL      11 Oct 2022 07:01  -  11 Oct 2022 19:59  --------------------------------------------------------  IN:    dextrose 5% + sodium chloride 0.45%: 770 mL    IV PiggyBack: 250 mL    IV PiggyBack: 50 mL    IV PiggyBack: 100 mL    Oral Fluid: 940 mL  Total IN: 2110 mL    OUT:    Bulb (mL): 315 mL    Ileostomy (mL): 775 mL    Indwelling Catheter - Urethral (mL): 550 mL  Total OUT: 1640 mL    Total NET: 470 mL          PHYSICAL EXAM    General: NAD, resting comfortably in bed  C/V: NSR  Pulm: Nonlabored breathing, no respiratory distress on room air  Abd: soft, ND, appropriate incisional TTP, ostomy patent/pink/perfused. ROSA with clear fluid  : yepez draining clear yellow urine.  Extrem: [facility]        LABS:                        9.1    7.68  )-----------( 315      ( 11 Oct 2022 05:30 )             28.6     10-11    134<L>  |  100  |  10  ----------------------------<  120<H>  3.6   |  27  |  0.50    Ca    8.8      11 Oct 2022 05:30  Phos  2.4     10-11  Mg     1.9     10-11            CULTURES:        Culture - Urine (collected 10-06-22 @ 14:05)  Source: .Urine Bladder Urine  Final Report (10-10-22 @ 08:41):    >100,000 CFU/ml Enterococcus faecalis    >100,000 CFU/ml Staphylococcus epidermidis    20,000 CFU/ml Kluyveromyces species    10,000 CFU/ml Clavispora lusitaniae  Organism: Enterococcus faecium  Staphylococcus epidermidis (10-10-22 @ 08:41)  Organism: Staphylococcus epidermidis (10-10-22 @ 08:41)      -  Linezolid: S 1      -  Nitrofurantoin: S <=32      -  Oxacillin: R >2      -  Rifampin: S <=1 Should not be used as monotherapy      -  Trimethoprim/Sulfamethoxazole: R >2/38      -  Vancomycin: S 2      Method Type: VERONICA  Organism: Enterococcus faecium (10-10-22 @ 08:41)      -  Ampicillin: S <=2 Predicts results to ampicillin/sulbactam, amoxacillin-clavulanate and  piperacillin-tazobactam.      -  Ciprofloxacin: S <=1      -  Nitrofurantoin: S <=32 Should not be used to treat pyelonephritis.      -  Tetra/Doxy: S <=1      -  Vancomycin: S 1      Method Type: VERONICA        RADIOLOGY & ADDITIONAL STUDIES:

## 2022-10-11 NOTE — DIETITIAN INITIAL EVALUATION ADULT - NSFNSGIIOFT_GEN_A_CORE
10-10-22 @ 07:01  -  10-11-22 @ 07:00  --------------------------------------------------------  OUT:    Ileostomy (mL): 2595 mL  Total OUT: 2595 mL    Total NET: -2595 mL      10-11-22 @ 07:01  -  10-11-22 @ 11:15  --------------------------------------------------------  OUT:    Ileostomy (mL): 275 mL  Total OUT: 275 mL    Total NET: -275 mL

## 2022-10-11 NOTE — DIETITIAN INITIAL EVALUATION ADULT - NSICDXPASTMEDICALHX_GEN_ALL_CORE_FT
PAST MEDICAL HISTORY:  Cystitis     H/O ascites     High cholesterol     History of asthma     History of diverticulitis     Hypertension     Intestinal obstruction     Malignant neoplasm of hepatic flexure     Vesicointestinal fistula

## 2022-10-11 NOTE — PROGRESS NOTE ADULT - SUBJECTIVE AND OBJECTIVE BOX
STATUS POST: Exploratory laparotomy, Closure, fistula, enterovesical, with cystectomy, Colon resection    POST OPERATIVE DAY #: 5    SUBJECTIVE: Patient seen and examined bedside. Patient states his pain is controlled and he has been moving around. Patient has been tolerating diet well.     Vital Signs Last 24 Hrs  T(C): 36.8 (11 Oct 2022 05:02), Max: 37.1 (10 Oct 2022 20:27)  T(F): 98.2 (11 Oct 2022 05:02), Max: 98.8 (10 Oct 2022 20:27)  HR: 65 (11 Oct 2022 05:02) (64 - 70)  BP: 143/78 (11 Oct 2022 05:02) (143/78 - 152/83)  BP(mean): --  RR: 18 (11 Oct 2022 05:02) (15 - 18)  SpO2: 96% (11 Oct 2022 05:02) (94% - 99%)    Parameters below as of 11 Oct 2022 05:02  Patient On (Oxygen Delivery Method): room air        I&O's Summary    10 Oct 2022 07:01  -  11 Oct 2022 07:00  --------------------------------------------------------  IN: 5070.5 mL / OUT: 4027.5 mL / NET: 1043 mL        Physical Exam:  General: NAD, resting comfortably in bed  C/V: NSR  Pulm: Nonlabored breathing, no respiratory distress  Abd: soft, ND, appropriately TTP near incision site. No guarding or rebound tenderness  Ostomy: +green bilious fluid in   Incisions: C/D/I  ROSA: LLQ SS fluid  Degroot: Yellow urine  Extremities: WWP, no edema    LABS:                        9.1    7.68  )-----------( 315      ( 11 Oct 2022 05:30 )             28.6     10-11    134<L>  |  100  |  10  ----------------------------<  120<H>  3.6   |  27  |  0.50    Ca    8.8      11 Oct 2022 05:30  Phos  2.4     10-11  Mg     1.9     10-11

## 2022-10-11 NOTE — DIETITIAN INITIAL EVALUATION ADULT - OTHER INFO
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 Now s/p  exploratory laparotomy, segmental colon resection, resection of colovesicular fistula, partial cystectomy, bladder diverticulectomy, colorectal anastomosis, rigid sigmoidoscopy, loop ileostomy creation on 10/6.    Pt seen sitting up on side of bed, eating breakfast this morning. Observed ~50% of [place] toast already eaten. This pt known to this RD from previous adm. Had reported UBW of 190 lbs, in which started losing wt ~6 months prior. Weighed at 170 lbs in Sept 2022, CBW of 153 lbs, indicates wt change of 37lbs/19% in >6 months. Noted mild muscle losses in temple. Pt reports decreased appetite since surgery, but is eating despite understanding need for adequate energy needs, so encourages self to eat.  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 Now s/p  exploratory laparotomy, segmental colon resection, resection of colovesicular fistula, partial cystectomy, bladder diverticulectomy, colorectal anastomosis, rigid sigmoidoscopy, loop ileostomy creation on 10/6.    Pt seen sitting up on side of bed, eating breakfast this morning. Observed ~50% of [place] toast already eaten. This pt known to this RD from previous adm. Had reported UBW of 190 lbs, in which started losing wt ~6 months prior. Weighed at 170 lbs in Sept 2022, CBW of 153 lbs, indicates wt change of 37lbs/19% in >6 months. Noted mild muscle losses in temple. Reports good PO, eating 3 meals/day, same portions at home, continues on ensure shakes BID. Endorsed poor appetite when at home, states that it was difficult to meet needs d/t fistula. Pt reports decreased appetite since surgery, but is eating despite understanding need for adequate energy needs, so encourages self to eat. No cultural, ethnic, Rastafarian food preferences noted. NKFA. Continue to encourage PO intake as able, focus on protein intake to meet increased needs. Discussed low fiber diet with pt, reviewed information, handout provided for pt as a resource. He denies any n/v/d/c. Ileostomy with 2595 cc x 24hrs, +flatus, BM 10/10. David: Skin: David 20, surgical incision noted, no PU or edema. RD to follow. Please see below for nutr recs.

## 2022-10-11 NOTE — DIETITIAN INITIAL EVALUATION ADULT - PERSON TAUGHT/METHOD
low fiber diet edu provided, encourage PO and lean protein intake as able/verbal instruction/written material/patient instructed

## 2022-10-11 NOTE — PHYSICAL THERAPY INITIAL EVALUATION ADULT - DID THE PATIENT HAVE SURGERY?
exploratory laparotomy, segmental colon resection, resection of colovesicular fistula, partial cystectomy, bladder diverticulectomy, colorectal anastomosis, rigid sigmoidoscopy, loop ileostomy creation./yes

## 2022-10-11 NOTE — PROGRESS NOTE ADULT - ATTENDING COMMENTS
Patient seen at bedside. Urine leak from bladder repair. Creatinine remains normal. No s/s of infection. If being discharged, would treat for 7 more days with Cipro 500mg BID. F/u next week to check outputs. Continue Degorot and ROSA

## 2022-10-11 NOTE — DIETITIAN INITIAL EVALUATION ADULT - PERTINENT MEDS FT
MEDICATIONS  (STANDING):  cefTRIAXone   IVPB 1000 milliGRAM(s) IV Intermittent every 24 hours  chlorhexidine 4% Liquid 1 Application(s) Topical <User Schedule>  dextrose 5% + sodium chloride 0.45%. 1000 milliLiter(s) (110 mL/Hr) IV Continuous <Continuous>  heparin   Injectable 5000 Unit(s) SubCutaneous every 8 hours  influenza  Vaccine (HIGH DOSE) 0.7 milliLiter(s) IntraMuscular once  loperamide 2 milliGRAM(s) Oral two times a day  metroNIDAZOLE  IVPB 500 milliGRAM(s) IV Intermittent every 8 hours  psyllium Powder 1 Packet(s) Oral daily  simvastatin 40 milliGRAM(s) Oral at bedtime    MEDICATIONS  (PRN):  acetaminophen     Tablet .. 650 milliGRAM(s) Oral every 6 hours PRN Mild Pain (1 - 3), Moderate Pain (4 - 6)  albuterol/ipratropium for Nebulization 3 milliLiter(s) Nebulizer every 6 hours PRN Shortness of Breath and/or Wheezing  ondansetron Injectable 4 milliGRAM(s) IV Push every 6 hours PRN Nausea  oxyCODONE    IR 5 milliGRAM(s) Oral every 6 hours PRN Severe Pain (7 - 10)  sodium chloride 0.9% lock flush 10 milliLiter(s) IV Push every 1 hour PRN Pre/post blood products, medications, blood draw, and to maintain line patency

## 2022-10-11 NOTE — PROGRESS NOTE ADULT - ASSESSMENT
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 ROSA Cr elevated, likely urine leak    Recommendations:  - Keep Degroot catheter while inpatient and on discharge  - After appropriate abx course for UTI, no need for abx on discharge  - Follow up with Dr. Torre 10/17, please call office to schedule an appointment  - discussed with attending  - no acute  interventions at this time    B Jackson CHASE (PGY-2)  Consult Urology Resident  Please feel free to reach out on Teams Chat   67M PMH HTN, HLD, asthma, colon cancer s/p laparoscopic right colectomy (2016), chronic diverticulitis with known colovesicular fistula since July 2022, recently admitted (9/19-26) w/ chronic cystitis and bacteremia treated with IV abx, now s/p elective laparoscopic resection of colovesicular fistula, cystotomy closure, and bilateral ureteral stent placement 10/06/2022. ROSA Cr elevated, likely urine leak    Recommendations:  - Keep Degroot catheter while inpatient and on discharge  - Would treat Enterococcus in urine for 7 more days with Cipro  - Follow up with Dr. Torre 10/17, please call office to schedule an appointment  - discussed with attending  - no acute  interventions at this time    B Jackson CHASE (PGY-2)  Consult Urology Resident  Please feel free to reach out on Teams Chat

## 2022-10-11 NOTE — DIETITIAN INITIAL EVALUATION ADULT - OTHER CALCULATIONS
lbs, %IBW 99%. ABW used to calculate energy needs due to pt's current body weight within % IBW. Needs adjusted for age and wt, post op demands, malnutrition.

## 2022-10-11 NOTE — PROGRESS NOTE ADULT - ASSESSMENT
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 Now s/p  exploratory laparotomy, segmental colon resection, resection of colovesicular fistula, partial cystectomy, bladder diverticulectomy, colorectal anastomosis, rigid sigmoidoscopy, loop ileostomy creation on 10/6. POD4 and pain is controlled.     LFD  pain/nausea control PRN  IVF with dextrose 5% and NaCl 0.45%  cef/flagyl   Degroot catheter  Encourage patient to be OOB and ambulating. F/u PT  Ostomy care  ROSA drain   ROSA creatinine  Immodium x2  Metamucil   Follow up urology recs   IS  SQH/SCDs

## 2022-10-12 LAB
ANION GAP SERPL CALC-SCNC: 7 MMOL/L — SIGNIFICANT CHANGE UP (ref 5–17)
BUN SERPL-MCNC: 7 MG/DL — SIGNIFICANT CHANGE UP (ref 7–23)
CALCIUM SERPL-MCNC: 9.1 MG/DL — SIGNIFICANT CHANGE UP (ref 8.4–10.5)
CHLORIDE SERPL-SCNC: 99 MMOL/L — SIGNIFICANT CHANGE UP (ref 96–108)
CO2 SERPL-SCNC: 29 MMOL/L — SIGNIFICANT CHANGE UP (ref 22–31)
CREAT SERPL-MCNC: 0.48 MG/DL — LOW (ref 0.5–1.3)
EGFR: 112 ML/MIN/1.73M2 — SIGNIFICANT CHANGE UP
GLUCOSE SERPL-MCNC: 126 MG/DL — HIGH (ref 70–99)
HCT VFR BLD CALC: 30.5 % — LOW (ref 39–50)
HGB BLD-MCNC: 9.6 G/DL — LOW (ref 13–17)
MAGNESIUM SERPL-MCNC: 1.9 MG/DL — SIGNIFICANT CHANGE UP (ref 1.6–2.6)
MCHC RBC-ENTMCNC: 24.9 PG — LOW (ref 27–34)
MCHC RBC-ENTMCNC: 31.5 GM/DL — LOW (ref 32–36)
MCV RBC AUTO: 79.2 FL — LOW (ref 80–100)
NRBC # BLD: 0 /100 WBCS — SIGNIFICANT CHANGE UP (ref 0–0)
PHOSPHATE SERPL-MCNC: 3 MG/DL — SIGNIFICANT CHANGE UP (ref 2.5–4.5)
PLATELET # BLD AUTO: 342 K/UL — SIGNIFICANT CHANGE UP (ref 150–400)
POTASSIUM SERPL-MCNC: 3.6 MMOL/L — SIGNIFICANT CHANGE UP (ref 3.5–5.3)
POTASSIUM SERPL-SCNC: 3.6 MMOL/L — SIGNIFICANT CHANGE UP (ref 3.5–5.3)
RBC # BLD: 3.85 M/UL — LOW (ref 4.2–5.8)
RBC # FLD: 18.8 % — HIGH (ref 10.3–14.5)
SODIUM SERPL-SCNC: 135 MMOL/L — SIGNIFICANT CHANGE UP (ref 135–145)
WBC # BLD: 8.66 K/UL — SIGNIFICANT CHANGE UP (ref 3.8–10.5)
WBC # FLD AUTO: 8.66 K/UL — SIGNIFICANT CHANGE UP (ref 3.8–10.5)

## 2022-10-12 RX ORDER — POTASSIUM CHLORIDE 20 MEQ
40 PACKET (EA) ORAL ONCE
Refills: 0 | Status: COMPLETED | OUTPATIENT
Start: 2022-10-12 | End: 2022-10-12

## 2022-10-12 RX ORDER — CIPROFLOXACIN LACTATE 400MG/40ML
400 VIAL (ML) INTRAVENOUS EVERY 12 HOURS
Refills: 0 | Status: DISCONTINUED | OUTPATIENT
Start: 2022-10-12 | End: 2022-10-14

## 2022-10-12 RX ORDER — MOXIFLOXACIN HYDROCHLORIDE TABLETS, 400 MG 400 MG/1
1 TABLET, FILM COATED ORAL
Qty: 14 | Refills: 0
Start: 2022-10-12 | End: 2022-10-18

## 2022-10-12 RX ADMIN — Medication 100 MILLIGRAM(S): at 05:03

## 2022-10-12 RX ADMIN — Medication 40 MILLIEQUIVALENT(S): at 12:51

## 2022-10-12 RX ADMIN — Medication 1 PACKET(S): at 12:50

## 2022-10-12 RX ADMIN — SIMVASTATIN 40 MILLIGRAM(S): 20 TABLET, FILM COATED ORAL at 22:01

## 2022-10-12 RX ADMIN — Medication 2 MILLIGRAM(S): at 05:03

## 2022-10-12 RX ADMIN — HEPARIN SODIUM 5000 UNIT(S): 5000 INJECTION INTRAVENOUS; SUBCUTANEOUS at 05:03

## 2022-10-12 RX ADMIN — Medication 200 MILLIGRAM(S): at 17:52

## 2022-10-12 RX ADMIN — CHLORHEXIDINE GLUCONATE 1 APPLICATION(S): 213 SOLUTION TOPICAL at 05:03

## 2022-10-12 RX ADMIN — Medication 2 MILLIGRAM(S): at 17:52

## 2022-10-12 RX ADMIN — HEPARIN SODIUM 5000 UNIT(S): 5000 INJECTION INTRAVENOUS; SUBCUTANEOUS at 22:04

## 2022-10-12 RX ADMIN — HEPARIN SODIUM 5000 UNIT(S): 5000 INJECTION INTRAVENOUS; SUBCUTANEOUS at 14:57

## 2022-10-12 NOTE — PROGRESS NOTE ADULT - SUBJECTIVE AND OBJECTIVE BOX
UROLOGY PROGRESS NOTE    SUBJECTIVE: Patient seen and examined bedside. Patient denies fevers/chills, HA/dizziness, nausea/vomiting, ab pain controlled. ambulating, ostomy is producing stool    heparin   Injectable 5000 Unit(s) SubCutaneous every 8 hours      Vital Signs Last 24 Hrs  T(C): 36.8 (12 Oct 2022 05:58), Max: 36.8 (11 Oct 2022 12:54)  T(F): 98.3 (12 Oct 2022 05:58), Max: 98.3 (12 Oct 2022 01:16)  HR: 66 (12 Oct 2022 05:58) (59 - 73)  BP: 143/76 (12 Oct 2022 05:58) (137/79 - 144/75)  BP(mean): 99 (12 Oct 2022 05:58) (99 - 105)  RR: 18 (12 Oct 2022 05:58) (17 - 18)  SpO2: 95% (12 Oct 2022 05:58) (95% - 96%)    Parameters below as of 12 Oct 2022 05:58  Patient On (Oxygen Delivery Method): room air      I&O's Detail    11 Oct 2022 07:01  -  12 Oct 2022 07:00  --------------------------------------------------------  IN:    dextrose 5% + sodium chloride 0.45%: 1870 mL    IV PiggyBack: 300 mL    IV PiggyBack: 250 mL    IV PiggyBack: 50 mL    Oral Fluid: 940 mL  Total IN: 3410 mL    OUT:    Bulb (mL): 1210 mL    Ileostomy (mL): 1675 mL    Indwelling Catheter - Urethral (mL): 650 mL  Total OUT: 3535 mL    Total NET: -125 mL          PHYSICAL EXAM    General: NAD, resting comfortably in bed  C/V: NSR  Pulm: Nonlabored breathing, no respiratory distress on room air  Abd: soft, ND, appropriate incisional TTP midline incision clean/dry;intact with staples. Ostomy producing stool, pink/patent/perfused, ROSA with serosang fluid  : yepez draining clear yellow urine.  Extrem: WWP, no edema, SCDs in place        LABS:                        9.6    8.66  )-----------( 342      ( 12 Oct 2022 05:30 )             30.5     10-12    135  |  99  |  7   ----------------------------<  126<H>  3.6   |  29  |  0.48<L>    Ca    9.1      12 Oct 2022 05:30  Phos  3.0     10-12  Mg     1.9     10-12            CULTURES:        Culture - Urine (collected 10-06-22 @ 14:05)  Source: .Urine Bladder Urine  Final Report (10-10-22 @ 08:41):    >100,000 CFU/ml Enterococcus faecalis    >100,000 CFU/ml Staphylococcus epidermidis    20,000 CFU/ml Kluyveromyces species    10,000 CFU/ml Clavispora lusitaniae  Organism: Enterococcus faecium  Staphylococcus epidermidis (10-10-22 @ 08:41)  Organism: Staphylococcus epidermidis (10-10-22 @ 08:41)      -  Linezolid: S 1      -  Nitrofurantoin: S <=32      -  Oxacillin: R >2      -  Rifampin: S <=1 Should not be used as monotherapy      -  Trimethoprim/Sulfamethoxazole: R >2/38      -  Vancomycin: S 2      Method Type: VERONICA  Organism: Enterococcus faecium (10-10-22 @ 08:41)      -  Ampicillin: S <=2 Predicts results to ampicillin/sulbactam, amoxacillin-clavulanate and  piperacillin-tazobactam.      -  Ciprofloxacin: S <=1      -  Nitrofurantoin: S <=32 Should not be used to treat pyelonephritis.      -  Tetra/Doxy: S <=1      -  Vancomycin: S 1      Method Type: VERONICA        RADIOLOGY & ADDITIONAL STUDIES:

## 2022-10-12 NOTE — PROGRESS NOTE ADULT - ASSESSMENT
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 Now s/p  exploratory laparotomy, segmental colon resection, resection of colovesicular fistula, partial cystectomy, bladder diverticulectomy, colorectal anastomosis, rigid sigmoidoscopy, loop ileostomy creation on 10/6.     LRD  pain/nausea control PRN  cef/flagyl  Imodium BID  Metamucil  Yepez catheter keep yepez  OOBA, IS, SCDs

## 2022-10-12 NOTE — ADVANCED PRACTICE NURSE CONSULT - ASSESSMENT
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 Now s/p  exploratory laparotomy, segmental colon resection, resection of colovesicular fistula, partial cystectomy, bladder diverticulectomy, colorectal anastomosis, rigid sigmoidoscopy, loop ileostomy creation on 10/6. Began basic education on care of stoma: frequency of emptying and changing appliance. New 2 3/4" [place] 2 piece appliance placed with patient observing with a mirror. Liquid green effluent  in pouch, claribel beneath stoma. Stoma rings placed over claribel then appliance placed over ring due to appliance leaking when area for claribel cut out in skin barrier. Extra supplies left at bedside for discharge. 
REviewed previous teaching on care of ostomy. Pt demonstrated emptying pouch while sitting on side of bed. Education provided on low fiber diet, what to do if he experiences diarrhea, making sure to drink plenty of liquids, take an extra appliance with him when he leaves the house. 
Pt's spouse at bedside for ostomy education. Pt's ostomy is high output at this time so instructed him on how to measure output if this occurs after discharge, when to contact surgeon. New 1 3/4" [place] 2 piece appliance placed with spouse taping procedure with phone. Reviewed previous education on emptying and changing appliance, low fiber diet, bathing. Education provided on how to order supplies after discharge. Extra supplies at bedside for discharge.

## 2022-10-12 NOTE — PROGRESS NOTE ADULT - ASSESSMENT
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 ROSA Cr elevated, likely urine leak    Recommendations:  - Keep Degroot catheter while inpatient and on discharge  - Would treat Enterococcus in urine for 7 more days with Cipro  - Follow up with Dr. Torre 10/17, please call office to schedule an appointment  - discussed with attending  - no acute  interventions at this time    B Jackson CHASE (PGY-2)  Consult Urology Resident  Please feel free to reach out on Teams Chat

## 2022-10-12 NOTE — PROGRESS NOTE ADULT - SUBJECTIVE AND OBJECTIVE BOX
SUBJECTIVE: Pt seen and examined at bedside with chief. Pt denies any complaints. Pain well controlled. Tolerating diet without N/V.    MEDICATIONS  (STANDING):  cefTRIAXone   IVPB 1000 milliGRAM(s) IV Intermittent every 24 hours  chlorhexidine 4% Liquid 1 Application(s) Topical <User Schedule>  dextrose 5% + sodium chloride 0.45%. 1000 milliLiter(s) (110 mL/Hr) IV Continuous <Continuous>  heparin   Injectable 5000 Unit(s) SubCutaneous every 8 hours  influenza  Vaccine (HIGH DOSE) 0.7 milliLiter(s) IntraMuscular once  loperamide 2 milliGRAM(s) Oral two times a day  metroNIDAZOLE  IVPB 500 milliGRAM(s) IV Intermittent every 8 hours  psyllium Powder 1 Packet(s) Oral daily  simvastatin 40 milliGRAM(s) Oral at bedtime    MEDICATIONS  (PRN):  acetaminophen     Tablet .. 650 milliGRAM(s) Oral every 6 hours PRN Mild Pain (1 - 3), Moderate Pain (4 - 6)  albuterol/ipratropium for Nebulization 3 milliLiter(s) Nebulizer every 6 hours PRN Shortness of Breath and/or Wheezing  ondansetron Injectable 4 milliGRAM(s) IV Push every 6 hours PRN Nausea  oxyCODONE    IR 5 milliGRAM(s) Oral every 6 hours PRN Severe Pain (7 - 10)  sodium chloride 0.9% lock flush 10 milliLiter(s) IV Push every 1 hour PRN Pre/post blood products, medications, blood draw, and to maintain line patency      Vital Signs Last 24 Hrs  T(C): 36.8 (12 Oct 2022 05:58), Max: 36.8 (11 Oct 2022 12:54)  T(F): 98.3 (12 Oct 2022 05:58), Max: 98.3 (12 Oct 2022 01:16)  HR: 66 (12 Oct 2022 05:58) (59 - 73)  BP: 143/76 (12 Oct 2022 05:58) (129/78 - 144/75)  BP(mean): 99 (12 Oct 2022 05:58) (99 - 105)  RR: 18 (12 Oct 2022 05:58) (17 - 18)  SpO2: 95% (12 Oct 2022 05:58) (95% - 96%)    Parameters below as of 12 Oct 2022 05:58  Patient On (Oxygen Delivery Method): room air        PHYSICAL EXAM:      Constitutional: A&Ox3    Respiratory: non labored breathing, no respiratory distress    Cardiovascular: NSR, RRR    Gastrointestinal: soft ND, NT                 Incision: CDI w/ staple. ROSA with urine. stoma pink and patent with stool in bag    Genitourinary: yepez to gravity    Extremities: (-) edema                  I&O's Detail    11 Oct 2022 07:01  -  12 Oct 2022 07:00  --------------------------------------------------------  IN:    dextrose 5% + sodium chloride 0.45%: 1870 mL    IV PiggyBack: 300 mL    IV PiggyBack: 250 mL    IV PiggyBack: 50 mL    Oral Fluid: 940 mL  Total IN: 3410 mL    OUT:    Bulb (mL): 1210 mL    Ileostomy (mL): 1675 mL    Indwelling Catheter - Urethral (mL): 650 mL  Total OUT: 3535 mL    Total NET: -125 mL          LABS:                        9.1    7.68  )-----------( 315      ( 11 Oct 2022 05:30 )             28.6     10-11    134<L>  |  100  |  10  ----------------------------<  120<H>  3.6   |  27  |  0.50    Ca    8.8      11 Oct 2022 05:30  Phos  2.4     10-11  Mg     1.9     10-11            RADIOLOGY & ADDITIONAL STUDIES:

## 2022-10-12 NOTE — PROGRESS NOTE ADULT - SUBJECTIVE AND OBJECTIVE BOX
high output from ROSA  AVSS  Abd soft  incision clean  stoma functioning    decrease IVF   follow up  will discuss plan with team and

## 2022-10-13 ENCOUNTER — TRANSCRIPTION ENCOUNTER (OUTPATIENT)
Age: 67
End: 2022-10-13

## 2022-10-13 LAB
ANION GAP SERPL CALC-SCNC: 7 MMOL/L — SIGNIFICANT CHANGE UP (ref 5–17)
BUN SERPL-MCNC: 7 MG/DL — SIGNIFICANT CHANGE UP (ref 7–23)
CALCIUM SERPL-MCNC: 9.3 MG/DL — SIGNIFICANT CHANGE UP (ref 8.4–10.5)
CHLORIDE SERPL-SCNC: 99 MMOL/L — SIGNIFICANT CHANGE UP (ref 96–108)
CO2 SERPL-SCNC: 29 MMOL/L — SIGNIFICANT CHANGE UP (ref 22–31)
CREAT SERPL-MCNC: 0.51 MG/DL — SIGNIFICANT CHANGE UP (ref 0.5–1.3)
EGFR: 110 ML/MIN/1.73M2 — SIGNIFICANT CHANGE UP
GLUCOSE SERPL-MCNC: 106 MG/DL — HIGH (ref 70–99)
HCT VFR BLD CALC: 29.8 % — LOW (ref 39–50)
HGB BLD-MCNC: 9.4 G/DL — LOW (ref 13–17)
MAGNESIUM SERPL-MCNC: 1.7 MG/DL — SIGNIFICANT CHANGE UP (ref 1.6–2.6)
MCHC RBC-ENTMCNC: 24.8 PG — LOW (ref 27–34)
MCHC RBC-ENTMCNC: 31.5 GM/DL — LOW (ref 32–36)
MCV RBC AUTO: 78.6 FL — LOW (ref 80–100)
NRBC # BLD: 0 /100 WBCS — SIGNIFICANT CHANGE UP (ref 0–0)
PHOSPHATE SERPL-MCNC: 3.3 MG/DL — SIGNIFICANT CHANGE UP (ref 2.5–4.5)
PLATELET # BLD AUTO: 331 K/UL — SIGNIFICANT CHANGE UP (ref 150–400)
POTASSIUM SERPL-MCNC: 4.1 MMOL/L — SIGNIFICANT CHANGE UP (ref 3.5–5.3)
POTASSIUM SERPL-SCNC: 4.1 MMOL/L — SIGNIFICANT CHANGE UP (ref 3.5–5.3)
RBC # BLD: 3.79 M/UL — LOW (ref 4.2–5.8)
RBC # FLD: 19.2 % — HIGH (ref 10.3–14.5)
SODIUM SERPL-SCNC: 135 MMOL/L — SIGNIFICANT CHANGE UP (ref 135–145)
WBC # BLD: 9.1 K/UL — SIGNIFICANT CHANGE UP (ref 3.8–10.5)
WBC # FLD AUTO: 9.1 K/UL — SIGNIFICANT CHANGE UP (ref 3.8–10.5)

## 2022-10-13 RX ORDER — LOSARTAN POTASSIUM 100 MG/1
1 TABLET, FILM COATED ORAL
Qty: 7 | Refills: 0
Start: 2022-10-13 | End: 2022-10-19

## 2022-10-13 RX ORDER — MAGNESIUM SULFATE 500 MG/ML
1 VIAL (ML) INJECTION ONCE
Refills: 0 | Status: COMPLETED | OUTPATIENT
Start: 2022-10-13 | End: 2022-10-13

## 2022-10-13 RX ORDER — LOPERAMIDE HCL 2 MG
1 TABLET ORAL
Qty: 21 | Refills: 0
Start: 2022-10-13 | End: 2022-10-19

## 2022-10-13 RX ORDER — MOXIFLOXACIN HYDROCHLORIDE TABLETS, 400 MG 400 MG/1
1 TABLET, FILM COATED ORAL
Qty: 14 | Refills: 0
Start: 2022-10-13 | End: 2022-10-19

## 2022-10-13 RX ORDER — LOPERAMIDE HCL 2 MG
1 TABLET ORAL
Qty: 30 | Refills: 0
Start: 2022-10-13 | End: 2022-10-22

## 2022-10-13 RX ADMIN — Medication 200 MILLIGRAM(S): at 18:07

## 2022-10-13 RX ADMIN — SIMVASTATIN 40 MILLIGRAM(S): 20 TABLET, FILM COATED ORAL at 22:49

## 2022-10-13 RX ADMIN — Medication 200 MILLIGRAM(S): at 06:05

## 2022-10-13 RX ADMIN — HEPARIN SODIUM 5000 UNIT(S): 5000 INJECTION INTRAVENOUS; SUBCUTANEOUS at 06:16

## 2022-10-13 RX ADMIN — Medication 2 MILLIGRAM(S): at 18:07

## 2022-10-13 RX ADMIN — Medication 2 MILLIGRAM(S): at 06:05

## 2022-10-13 RX ADMIN — HEPARIN SODIUM 5000 UNIT(S): 5000 INJECTION INTRAVENOUS; SUBCUTANEOUS at 23:05

## 2022-10-13 RX ADMIN — Medication 1 PACKET(S): at 12:09

## 2022-10-13 RX ADMIN — CHLORHEXIDINE GLUCONATE 1 APPLICATION(S): 213 SOLUTION TOPICAL at 07:33

## 2022-10-13 RX ADMIN — Medication 100 GRAM(S): at 12:08

## 2022-10-13 NOTE — PROGRESS NOTE ADULT - SUBJECTIVE AND OBJECTIVE BOX
UROLOGY PROGRESS NOTE    SUBJECTIVE: Patient seen and examined bedside. Patient denies fever/chills, feeling weak, tolerating yepez.    ciprofloxacin   IVPB 400 milliGRAM(s) IV Intermittent every 12 hours  heparin   Injectable 5000 Unit(s) SubCutaneous every 8 hours      Vital Signs Last 24 Hrs  T(C): 36.6 (13 Oct 2022 09:35), Max: 36.7 (12 Oct 2022 12:22)  T(F): 97.8 (13 Oct 2022 09:35), Max: 98.1 (12 Oct 2022 16:22)  HR: 88 (13 Oct 2022 09:35) (76 - 88)  BP: 133/88 (13 Oct 2022 09:35) (127/83 - 144/77)  BP(mean): --  RR: 18 (13 Oct 2022 09:35) (17 - 18)  SpO2: 96% (13 Oct 2022 09:35) (95% - 97%)    Parameters below as of 13 Oct 2022 09:35  Patient On (Oxygen Delivery Method): room air      I&O's Detail    12 Oct 2022 07:01  -  13 Oct 2022 07:00  --------------------------------------------------------  IN:    dextrose 5% + sodium chloride 0.45%: 220 mL    IV PiggyBack: 400 mL    Oral Fluid: 1260 mL  Total IN: 1880 mL    OUT:    Bulb (mL): 945 mL    Ileostomy (mL): 2250 mL    Indwelling Catheter - Urethral (mL): 200 mL  Total OUT: 3395 mL    Total NET: -1515 mL      13 Oct 2022 07:01  -  13 Oct 2022 10:32  --------------------------------------------------------  IN:    Oral Fluid: 360 mL  Total IN: 360 mL    OUT:    Ileostomy (mL): 200 mL  Total OUT: 200 mL    Total NET: 160 mL          PHYSICAL EXAM    General: NAD, resting comfortably in bed  C/V: NSR  Pulm: Nonlabored breathing, no respiratory distress on room air  Abd: soft, ND, appropriate incisional TTP midline incision clean/dry;intact with staples. Ostomy producing stool, pink/patent/perfused, ROSA with serosang fluid  : yepez draining clear yellow urine.  Extrem: WWP, no edema, SCDs in place        LABS:                        9.4    9.10  )-----------( 331      ( 13 Oct 2022 06:20 )             29.8     10-13    135  |  99  |  7   ----------------------------<  106<H>  4.1   |  29  |  0.51    Ca    9.3      13 Oct 2022 06:20  Phos  3.3     10-13  Mg     1.7     10-13            CULTURES:        Culture - Urine (collected 10-06-22 @ 14:05)  Source: .Urine Bladder Urine  Final Report (10-10-22 @ 08:41):    >100,000 CFU/ml Enterococcus faecalis    >100,000 CFU/ml Staphylococcus epidermidis    20,000 CFU/ml Kluyveromyces species    10,000 CFU/ml Clavispora lusitaniae  Organism: Enterococcus faecium  Staphylococcus epidermidis (10-10-22 @ 08:41)  Organism: Staphylococcus epidermidis (10-10-22 @ 08:41)      -  Linezolid: S 1      -  Nitrofurantoin: S <=32      -  Oxacillin: R >2      -  Rifampin: S <=1 Should not be used as monotherapy      -  Trimethoprim/Sulfamethoxazole: R >2/38      -  Vancomycin: S 2      Method Type: VERONICA  Organism: Enterococcus faecium (10-10-22 @ 08:41)      -  Ampicillin: S <=2 Predicts results to ampicillin/sulbactam, amoxacillin-clavulanate and  piperacillin-tazobactam.      -  Ciprofloxacin: S <=1      -  Nitrofurantoin: S <=32 Should not be used to treat pyelonephritis.      -  Tetra/Doxy: S <=1      -  Vancomycin: S 1      Method Type: VERONICA        RADIOLOGY & ADDITIONAL STUDIES:

## 2022-10-13 NOTE — DISCHARGE NOTE NURSING/CASE MANAGEMENT/SOCIAL WORK - NSDCPNINST_GEN_ALL_CORE
Call Dr. Zhang if you have any questions or concerns. Continue care of your yepez catheter,  left side of abdomen drain to drainage bag, ileostomy bag as you were instructed and as you had demonstrated. Educational material given- [employer] Patient Education: 1. Surgical Wound Discharge Instructions 2. How To Care For Your Yepez Catheter.

## 2022-10-13 NOTE — PROGRESS NOTE ADULT - SUBJECTIVE AND OBJECTIVE BOX
GENERAL SURGERY PROGRESS NOTE    SUBJECTIVE: Patient seen and examined bedside.    ciprofloxacin   IVPB 400 milliGRAM(s) IV Intermittent every 12 hours  heparin   Injectable 5000 Unit(s) SubCutaneous every 8 hours      Vital Signs Last 24 Hrs  T(C): 36.7 (13 Oct 2022 05:02), Max: 36.7 (12 Oct 2022 12:22)  T(F): 98.1 (13 Oct 2022 05:02), Max: 98.1 (12 Oct 2022 16:22)  HR: 78 (13 Oct 2022 05:02) (76 - 83)  BP: 127/83 (13 Oct 2022 05:02) (122/78 - 144/77)  BP(mean): --  RR: 17 (13 Oct 2022 05:02) (17 - 18)  SpO2: 97% (13 Oct 2022 05:02) (95% - 97%)    Parameters below as of 13 Oct 2022 05:02  Patient On (Oxygen Delivery Method): room air      I&O's Detail    12 Oct 2022 07:01  -  13 Oct 2022 07:00  --------------------------------------------------------  IN:    dextrose 5% + sodium chloride 0.45%: 220 mL    IV PiggyBack: 400 mL    Oral Fluid: 1260 mL  Total IN: 1880 mL    OUT:    Bulb (mL): 945 mL    Ileostomy (mL): 2250 mL    Indwelling Catheter - Urethral (mL): 200 mL  Total OUT: 3395 mL    Total NET: -1515 mL          PHYSICAL EXAM    General: NAD, resting comfortably in bed  C/V: NSR  Pulm: Nonlabored breathing, no respiratory distress on room air  Abd: soft, ND, appropriate incisional tenderness, no rebound tenderness, no guarding  Extrem: WWP, no edema, SCDs in place        LABS:                        9.4    9.10  )-----------( 331      ( 13 Oct 2022 06:20 )             29.8     10-13    135  |  99  |  7   ----------------------------<  106<H>  4.1   |  29  |  0.51    Ca    9.3      13 Oct 2022 06:20  Phos  3.3     10-13  Mg     1.7     10-13            RADIOLOGY & ADDITIONAL STUDIES:   GENERAL SURGERY PROGRESS NOTE    SUBJECTIVE: Patient seen and examined bedside with chief. Pt denies any complaints, pain is controlled, tolerating diet without n/v.     ciprofloxacin   IVPB 400 milliGRAM(s) IV Intermittent every 12 hours  heparin   Injectable 5000 Unit(s) SubCutaneous every 8 hours      Vital Signs Last 24 Hrs  T(C): 36.7 (13 Oct 2022 05:02), Max: 36.7 (12 Oct 2022 12:22)  T(F): 98.1 (13 Oct 2022 05:02), Max: 98.1 (12 Oct 2022 16:22)  HR: 78 (13 Oct 2022 05:02) (76 - 83)  BP: 127/83 (13 Oct 2022 05:02) (122/78 - 144/77)  BP(mean): --  RR: 17 (13 Oct 2022 05:02) (17 - 18)  SpO2: 97% (13 Oct 2022 05:02) (95% - 97%)    Parameters below as of 13 Oct 2022 05:02  Patient On (Oxygen Delivery Method): room air      I&O's Detail    12 Oct 2022 07:01  -  13 Oct 2022 07:00  --------------------------------------------------------  IN:    dextrose 5% + sodium chloride 0.45%: 220 mL    IV PiggyBack: 400 mL    Oral Fluid: 1260 mL  Total IN: 1880 mL    OUT:    Bulb (mL): 945 mL    Ileostomy (mL): 2250 mL    Indwelling Catheter - Urethral (mL): 200 mL  Total OUT: 3395 mL    Total NET: -1515 mL          PHYSICAL EXAM    General: NAD, resting comfortably in bed  C/V: NSR  Pulm: Nonlabored breathing, no respiratory distress on room air  Abd: soft, ND, NT, CDI with staple, ROSA with urine, pink stoma and patent with gas and stool in the bag. 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 Now s/p  exploratory laparotomy, segmental colon resection, resection of colovesicular fistula, partial cystectomy, bladder diverticulectomy, colorectal anastomosis, rigid sigmoidoscopy, loop ileostomy creation on 10/6.     LRD w/ ensures   pain/nausea control PRN  cef/flagyl (d/c'd), IV Cipro (10/12- ); plan for PO cipro on d/c for +enterococcus urine   Imodium BID  Metamucil  F/u with urology Monday October 17, 2022, rec to get BMP and CBC at this appoitment to f/u creatinine   D/c home with loperamide     Yepez catheter keep yepez, uro following

## 2022-10-13 NOTE — DISCHARGE NOTE NURSING/CASE MANAGEMENT/SOCIAL WORK - PATIENT PORTAL LINK FT
You can access the FollowMyHealth Patient Portal offered by [facility] by registering at the following website: [email or web address]. By joining [employer]’s FollowMyHealth portal, you will also be able to view your health information using other applications (apps) compatible with our system.

## 2022-10-13 NOTE — DISCHARGE NOTE NURSING/CASE MANAGEMENT/SOCIAL WORK - NSDCPEFALRISK_GEN_ALL_CORE
For information on Fall & Injury Prevention, visit: [email or web address] OR  [email or web address] OR  https://www.cdc.gov/steadi/patient.html

## 2022-10-13 NOTE — DISCHARGE NOTE NURSING/CASE MANAGEMENT/SOCIAL WORK - NSDCFUADDAPPT_GEN_ALL_CORE_FT
Please followup with your primary care provider for clearance to continue at home blood pressure medication.    Please follow up with Dr. Zhang in 1-2 weeks for follow up appointment.    Please follow up with Dr. Torre in 2 weeks to assess for yepez catheter removal.

## 2022-10-14 VITALS
DIASTOLIC BLOOD PRESSURE: 80 MMHG | OXYGEN SATURATION: 98 % | TEMPERATURE: 98 F | HEART RATE: 90 BPM | RESPIRATION RATE: 17 BRPM | SYSTOLIC BLOOD PRESSURE: 121 MMHG

## 2022-10-14 PROBLEM — Z87.19 PERSONAL HISTORY OF OTHER DISEASES OF THE DIGESTIVE SYSTEM: Chronic | Status: ACTIVE | Noted: 2022-10-05

## 2022-10-14 PROBLEM — N32.1 VESICOINTESTINAL FISTULA: Chronic | Status: ACTIVE | Noted: 2022-10-05

## 2022-10-14 PROBLEM — N30.90 CYSTITIS, UNSPECIFIED WITHOUT HEMATURIA: Chronic | Status: ACTIVE | Noted: 2022-10-05

## 2022-10-14 PROBLEM — Z87.898 PERSONAL HISTORY OF OTHER SPECIFIED CONDITIONS: Chronic | Status: ACTIVE | Noted: 2022-10-05

## 2022-10-14 RX ADMIN — Medication 200 MILLIGRAM(S): at 06:01

## 2022-10-14 RX ADMIN — Medication 2 MILLIGRAM(S): at 06:01

## 2022-10-14 RX ADMIN — HEPARIN SODIUM 5000 UNIT(S): 5000 INJECTION INTRAVENOUS; SUBCUTANEOUS at 07:23

## 2022-10-14 NOTE — PROGRESS NOTE ADULT - NUTRITIONAL ASSESSMENT
This patient has been assessed with a concern for Malnutrition and has been determined to have a diagnosis/diagnoses of Severe protein-calorie malnutrition.    This patient is being managed with:   Diet Low Fiber-  Supplement Feeding Modality:  Oral  Ensure Plus High Protein Cans or Servings Per Day:  2       Frequency:  Daily  Entered: Oct 11 2022  2:51PM    

## 2022-10-14 NOTE — PROGRESS NOTE ADULT - ASSESSMENT
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 Now s/p  exploratory laparotomy, segmental colon resection, resection of colovesicular fistula, partial cystectomy, bladder diverticulectomy, colorectal anastomosis, rigid sigmoidoscopy, loop ileostomy creation on 10/6. POD8 and clinically doing well. Tolerating diet and pain well controlled. Ready for discharge.    LRD w/ ensures   pain/nausea control PRN  IV Cipro (10/12- ) while inpatient; plan for PO cipro on d/c for +enterococcus urine and imodium for ostomy output  Imodium BID while inpatient  Metamucil  F/u with urology Monday October 17, 2022, rec to get BMP and CBC at this appointment to f/u creatinine - Patient and Dr. Torre aware  Likely bladder leak - D/C with yepez catheter and abdominal drain to nephrostomy bag  DISPO: D/C today

## 2022-10-14 NOTE — PROGRESS NOTE ADULT - SUBJECTIVE AND OBJECTIVE BOX
GENERAL SURGERY PROGRESS NOTE    SUBJECTIVE: Patient seen and examined bedside. Pt denies any complaints or pain. Pt tolerating diet, states that ostomy is producing stool. Pt denies any n/v, no acute events overnight.     ciprofloxacin   IVPB 400 milliGRAM(s) IV Intermittent every 12 hours  heparin   Injectable 5000 Unit(s) SubCutaneous every 8 hours      Vital Signs Last 24 Hrs  T(C): 37 (14 Oct 2022 04:42), Max: 37 (13 Oct 2022 16:30)  T(F): 98.6 (14 Oct 2022 04:42), Max: 98.6 (13 Oct 2022 16:30)  HR: 90 (14 Oct 2022 04:42) (79 - 90)  BP: 126/77 (14 Oct 2022 04:42) (118/76 - 133/88)  BP(mean): --  RR: 17 (14 Oct 2022 04:42) (17 - 19)  SpO2: 95% (14 Oct 2022 04:42) (95% - 96%)    Parameters below as of 14 Oct 2022 04:42  Patient On (Oxygen Delivery Method): room air      I&O's Detail    13 Oct 2022 07:01  -  14 Oct 2022 07:00  --------------------------------------------------------  IN:    IV PiggyBack: 100 mL    IV PiggyBack: 400 mL    Oral Fluid: 1140 mL  Total IN: 1640 mL    OUT:    Bulb (mL): 1575 mL    Ileostomy (mL): 1375 mL    Indwelling Catheter - Urethral (mL): 800 mL  Total OUT: 3750 mL    Total NET: -2110 mL          PHYSICAL EXAM    General: NAD, resting comfortably in bed  C/V: NSR  Pulm: Nonlabored breathing, no respiratory distress on room air  Abd: soft, ND, appropriate incisional tenderness, no rebound tenderness, no guarding, ostomy bag w/ gas and stool  :yepez draining clear yellow urine   Extrem: WWP, no edema, SCDs in place        LABS:                        9.4    9.10  )-----------( 331      ( 13 Oct 2022 06:20 )             29.8     10-13    135  |  99  |  7   ----------------------------<  106<H>  4.1   |  29  |  0.51    Ca    9.3      13 Oct 2022 06:20  Phos  3.3     10-13  Mg     1.7     10-13            RADIOLOGY & ADDITIONAL STUDIES:   GENERAL SURGERY PROGRESS NOTE    SUBJECTIVE: Patient seen and examined bedside. No acute events noted overnight. Patient reports pain is well controlled and he is tolerating diet without nausea or vomiting. Denies issues with ostomy or abdominal drain. Denies fevers, chills, CP, SOB.    ciprofloxacin   IVPB 400 milliGRAM(s) IV Intermittent every 12 hours  heparin   Injectable 5000 Unit(s) SubCutaneous every 8 hours      Vital Signs Last 24 Hrs  T(C): 37 (14 Oct 2022 04:42), Max: 37 (13 Oct 2022 16:30)  T(F): 98.6 (14 Oct 2022 04:42), Max: 98.6 (13 Oct 2022 16:30)  HR: 90 (14 Oct 2022 04:42) (79 - 90)  BP: 126/77 (14 Oct 2022 04:42) (118/76 - 133/88)  BP(mean): --  RR: 17 (14 Oct 2022 04:42) (17 - 19)  SpO2: 95% (14 Oct 2022 04:42) (95% - 96%)    Parameters below as of 14 Oct 2022 04:42  Patient On (Oxygen Delivery Method): room air      I&O's Detail    13 Oct 2022 07:01  -  14 Oct 2022 07:00  --------------------------------------------------------  IN:    IV PiggyBack: 100 mL    IV PiggyBack: 400 mL    Oral Fluid: 1140 mL  Total IN: 1640 mL    OUT:    Bulb (mL): 1575 mL    Ileostomy (mL): 1375 mL    Indwelling Catheter - Urethral (mL): 800 mL  Total OUT: 3750 mL    Total NET: -2110 mL          PHYSICAL EXAM  General: NAD, resting comfortably in bed  C/V: NSR  Pulm: Nonlabored breathing, no respiratory distress on room air  Abd: soft, ND, nontender, no rebound tenderness, no guarding, ostomy bag w/ gas and stool. Drainage back for abdominal drain with yellow output.  : yepez draining clear yellow urine   Extrem: WWP, no edema, SCDs in place        LABS:                        9.4    9.10  )-----------( 331      ( 13 Oct 2022 06:20 )             29.8     10-13    135  |  99  |  7   ----------------------------<  106<H>  4.1   |  29  |  0.51    Ca    9.3      13 Oct 2022 06:20  Phos  3.3     10-13  Mg     1.7     10-13            RADIOLOGY & ADDITIONAL STUDIES:

## 2022-10-14 NOTE — PROGRESS NOTE ADULT - PROVIDER SPECIALTY LIST ADULT
Colorectal Surgery
Surgery
Urology
Colorectal Surgery
Colorectal Surgery
Surgery
Urology
Urology
Colorectal Surgery
Urology
Urology

## 2022-10-14 NOTE — PROGRESS NOTE ADULT - ASSESSMENT
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 ROSA Cr elevated, likely urine leak    Recommendations:  - Keep Degroot catheter while inpatient and on discharge  - Would treat Enterococcus in urine for 7 total days of Cipro  - Follow up with Dr. Torre 10/17, please call office to schedule an appointment  - discussed with attending  - no acute  interventions at this time    B Jackson CHASE (PGY-2)  Consult Urology Resident  Please feel free to reach out on Teams Chat

## 2022-10-14 NOTE — PROGRESS NOTE ADULT - SUBJECTIVE AND OBJECTIVE BOX
UROLOGY PROGRESS NOTE    SUBJECTIVE: Patient seen and examined bedside. Patient denies fevers/chills, HA/dizziness, nausea/vomiting, pain controlled. Tolerating PO. Reports more drainage in yepez now.     ciprofloxacin   IVPB 400 milliGRAM(s) IV Intermittent every 12 hours  heparin   Injectable 5000 Unit(s) SubCutaneous every 8 hours      Vital Signs Last 24 Hrs  T(C): 37 (14 Oct 2022 04:42), Max: 37 (13 Oct 2022 16:30)  T(F): 98.6 (14 Oct 2022 04:42), Max: 98.6 (13 Oct 2022 16:30)  HR: 90 (14 Oct 2022 04:42) (79 - 90)  BP: 126/77 (14 Oct 2022 04:42) (118/76 - 133/88)  BP(mean): --  RR: 17 (14 Oct 2022 04:42) (17 - 19)  SpO2: 95% (14 Oct 2022 04:42) (95% - 96%)    Parameters below as of 14 Oct 2022 04:42  Patient On (Oxygen Delivery Method): room air      I&O's Detail    13 Oct 2022 07:01  -  14 Oct 2022 07:00  --------------------------------------------------------  IN:    IV PiggyBack: 100 mL    IV PiggyBack: 400 mL    Oral Fluid: 1140 mL  Total IN: 1640 mL    OUT:    Bulb (mL): 1575 mL    Ileostomy (mL): 1375 mL    Indwelling Catheter - Urethral (mL): 800 mL  Total OUT: 3750 mL    Total NET: -2110 mL          PHYSICAL EXAM    General: NAD, resting comfortably in bed  C/V: NSR  Pulm: Nonlabored breathing, no respiratory distress on room air  Abd: soft, ND, appropriate incisional TTP midline incision clean/dry/intact with staples. Ostomy producing stool, pink/patent/perfused, ROSA with yellow clear fluid  : yepez draining clear yellow urine.  Extrem: WWP, no edema, SCDs in place          LABS:                        9.4    9.10  )-----------( 331      ( 13 Oct 2022 06:20 )             29.8     10-13    135  |  99  |  7   ----------------------------<  106<H>  4.1   |  29  |  0.51    Ca    9.3      13 Oct 2022 06:20  Phos  3.3     10-13  Mg     1.7     10-13            CULTURES:          RADIOLOGY & ADDITIONAL STUDIES:

## 2022-10-17 ENCOUNTER — LABORATORY RESULT (OUTPATIENT)
Age: 67
End: 2022-10-17

## 2022-10-17 ENCOUNTER — APPOINTMENT (OUTPATIENT)
Dept: UROLOGY | Facility: CLINIC | Age: 67
End: 2022-10-17

## 2022-10-17 VITALS
WEIGHT: 150 LBS | HEART RATE: 123 BPM | BODY MASS INDEX: 22.73 KG/M2 | HEIGHT: 68 IN | TEMPERATURE: 97 F | DIASTOLIC BLOOD PRESSURE: 87 MMHG | SYSTOLIC BLOOD PRESSURE: 117 MMHG

## 2022-10-17 PROCEDURE — 99024 POSTOP FOLLOW-UP VISIT: CPT

## 2022-10-18 ENCOUNTER — OUTPATIENT (OUTPATIENT)
Dept: OUTPATIENT SERVICES | Facility: HOSPITAL | Age: 67
LOS: 1 days | End: 2022-10-18
Payer: COMMERCIAL

## 2022-10-18 ENCOUNTER — APPOINTMENT (OUTPATIENT)
Dept: CT IMAGING | Facility: HOSPITAL | Age: 67
End: 2022-10-18

## 2022-10-18 ENCOUNTER — INPATIENT (INPATIENT)
Facility: HOSPITAL | Age: 67
LOS: 13 days | Discharge: HOME HEALTH | DRG: 329 | End: 2022-11-01
Attending: COLON & RECTAL SURGERY | Admitting: COLON & RECTAL SURGERY
Payer: COMMERCIAL

## 2022-10-18 VITALS
OXYGEN SATURATION: 99 % | DIASTOLIC BLOOD PRESSURE: 81 MMHG | HEIGHT: 68 IN | TEMPERATURE: 98 F | RESPIRATION RATE: 18 BRPM | SYSTOLIC BLOOD PRESSURE: 123 MMHG | HEART RATE: 91 BPM

## 2022-10-18 DIAGNOSIS — Z90.49 ACQUIRED ABSENCE OF OTHER SPECIFIED PARTS OF DIGESTIVE TRACT: Chronic | ICD-10-CM

## 2022-10-18 DIAGNOSIS — Z98.890 OTHER SPECIFIED POSTPROCEDURAL STATES: Chronic | ICD-10-CM

## 2022-10-18 DIAGNOSIS — Z96.0 PRESENCE OF UROGENITAL IMPLANTS: Chronic | ICD-10-CM

## 2022-10-18 DIAGNOSIS — N32.1 VESICOINTESTINAL FISTULA: Chronic | ICD-10-CM

## 2022-10-18 DIAGNOSIS — Z98.89 OTHER SPECIFIED POSTPROCEDURAL STATES: Chronic | ICD-10-CM

## 2022-10-18 LAB
ALBUMIN SERPL ELPH-MCNC: 3.9 G/DL — SIGNIFICANT CHANGE UP (ref 3.3–5)
ALBUMIN SERPL ELPH-MCNC: 4.2 G/DL
ALP BLD-CCNC: 130 U/L
ALP SERPL-CCNC: 123 U/L — HIGH (ref 40–120)
ALT FLD-CCNC: 58 U/L — HIGH (ref 10–45)
ALT SERPL-CCNC: 56 U/L
ANION GAP SERPL CALC-SCNC: 11 MMOL/L — SIGNIFICANT CHANGE UP (ref 5–17)
ANION GAP SERPL CALC-SCNC: 13 MMOL/L
APPEARANCE UR: ABNORMAL
APTT BLD: 20.6 SEC — LOW (ref 27.5–35.5)
AST SERPL-CCNC: 39 U/L — SIGNIFICANT CHANGE UP (ref 10–40)
AST SERPL-CCNC: 47 U/L
BACTERIA # UR AUTO: PRESENT /HPF
BASOPHILS # BLD AUTO: 0.06 K/UL
BASOPHILS # BLD AUTO: 0.07 K/UL — SIGNIFICANT CHANGE UP (ref 0–0.2)
BASOPHILS NFR BLD AUTO: 0.3 % — SIGNIFICANT CHANGE UP (ref 0–2)
BASOPHILS NFR BLD AUTO: 0.4 %
BILIRUB SERPL-MCNC: 0.2 MG/DL
BILIRUB SERPL-MCNC: 0.4 MG/DL — SIGNIFICANT CHANGE UP (ref 0.2–1.2)
BILIRUB UR-MCNC: NEGATIVE — SIGNIFICANT CHANGE UP
BLD GP AB SCN SERPL QL: NEGATIVE — SIGNIFICANT CHANGE UP
BUN SERPL-MCNC: 21 MG/DL
BUN SERPL-MCNC: 22 MG/DL — SIGNIFICANT CHANGE UP (ref 7–23)
CALCIUM SERPL-MCNC: 10.5 MG/DL
CALCIUM SERPL-MCNC: 10.8 MG/DL — HIGH (ref 8.4–10.5)
CHLORIDE SERPL-SCNC: 93 MMOL/L — LOW (ref 96–108)
CHLORIDE SERPL-SCNC: 98 MMOL/L
CO2 SERPL-SCNC: 26 MMOL/L
CO2 SERPL-SCNC: 31 MMOL/L — SIGNIFICANT CHANGE UP (ref 22–31)
COD CRY URNS QL: ABNORMAL /HPF
COLOR SPEC: YELLOW — SIGNIFICANT CHANGE UP
COMMENT - URINE: SIGNIFICANT CHANGE UP
CREAT SERPL-MCNC: 0.78 MG/DL — SIGNIFICANT CHANGE UP (ref 0.5–1.3)
CREAT SERPL-MCNC: 0.88 MG/DL
DIFF PNL FLD: ABNORMAL
EGFR: 94 ML/MIN/1.73M2
EGFR: 97 ML/MIN/1.73M2 — SIGNIFICANT CHANGE UP
EOSINOPHIL # BLD AUTO: 0.04 K/UL — SIGNIFICANT CHANGE UP (ref 0–0.5)
EOSINOPHIL # BLD AUTO: 0.11 K/UL
EOSINOPHIL NFR BLD AUTO: 0.2 % — SIGNIFICANT CHANGE UP (ref 0–6)
EOSINOPHIL NFR BLD AUTO: 0.8 %
EPI CELLS # UR: SIGNIFICANT CHANGE UP /HPF (ref 0–5)
GLUCOSE SERPL-MCNC: 113 MG/DL — HIGH (ref 70–99)
GLUCOSE SERPL-MCNC: 155 MG/DL
GLUCOSE UR QL: NEGATIVE — SIGNIFICANT CHANGE UP
HCT VFR BLD CALC: 37.1 % — LOW (ref 39–50)
HCT VFR BLD CALC: 38.9 %
HGB BLD-MCNC: 11.8 G/DL — LOW (ref 13–17)
HGB BLD-MCNC: 11.9 G/DL
IMM GRANULOCYTES NFR BLD AUTO: 0.5 % — SIGNIFICANT CHANGE UP (ref 0–0.9)
IMM GRANULOCYTES NFR BLD AUTO: 0.8 %
INR BLD: 0.99 — SIGNIFICANT CHANGE UP (ref 0.88–1.16)
KETONES UR-MCNC: NEGATIVE — SIGNIFICANT CHANGE UP
LACTATE SERPL-SCNC: 1.5 MMOL/L — SIGNIFICANT CHANGE UP (ref 0.5–2)
LEUKOCYTE ESTERASE UR-ACNC: ABNORMAL
LIDOCAIN IGE QN: 52 U/L — SIGNIFICANT CHANGE UP (ref 7–60)
LYMPHOCYTES # BLD AUTO: 1.15 K/UL — SIGNIFICANT CHANGE UP (ref 1–3.3)
LYMPHOCYTES # BLD AUTO: 1.2 K/UL
LYMPHOCYTES # BLD AUTO: 5.7 % — LOW (ref 13–44)
LYMPHOCYTES NFR BLD AUTO: 8.9 %
MAN DIFF?: NORMAL
MCHC RBC-ENTMCNC: 25.2 PG
MCHC RBC-ENTMCNC: 25.2 PG — LOW (ref 27–34)
MCHC RBC-ENTMCNC: 30.6 GM/DL
MCHC RBC-ENTMCNC: 31.8 GM/DL — LOW (ref 32–36)
MCV RBC AUTO: 79.3 FL — LOW (ref 80–100)
MCV RBC AUTO: 82.2 FL
MONOCYTES # BLD AUTO: 0.81 K/UL
MONOCYTES # BLD AUTO: 1.14 K/UL — HIGH (ref 0–0.9)
MONOCYTES NFR BLD AUTO: 5.7 % — SIGNIFICANT CHANGE UP (ref 2–14)
MONOCYTES NFR BLD AUTO: 6 %
NEUTROPHILS # BLD AUTO: 11.12 K/UL
NEUTROPHILS # BLD AUTO: 17.62 K/UL — HIGH (ref 1.8–7.4)
NEUTROPHILS NFR BLD AUTO: 83.1 %
NEUTROPHILS NFR BLD AUTO: 87.6 % — HIGH (ref 43–77)
NITRITE UR-MCNC: NEGATIVE — SIGNIFICANT CHANGE UP
NRBC # BLD: 0 /100 WBCS — SIGNIFICANT CHANGE UP (ref 0–0)
PH UR: 6 — SIGNIFICANT CHANGE UP (ref 5–8)
PLATELET # BLD AUTO: 669 K/UL — HIGH (ref 150–400)
PLATELET # BLD AUTO: 726 K/UL
POCT ISTAT CREATININE: 0.9 MG/DL — SIGNIFICANT CHANGE UP (ref 0.5–1.3)
POTASSIUM SERPL-MCNC: 5 MMOL/L — SIGNIFICANT CHANGE UP (ref 3.5–5.3)
POTASSIUM SERPL-SCNC: 5 MMOL/L — SIGNIFICANT CHANGE UP (ref 3.5–5.3)
POTASSIUM SERPL-SCNC: 5.6 MMOL/L
PROT SERPL-MCNC: 6.7 G/DL
PROT SERPL-MCNC: 6.7 G/DL — SIGNIFICANT CHANGE UP (ref 6–8.3)
PROT UR-MCNC: 100 MG/DL
PROTHROM AB SERPL-ACNC: 11.8 SEC — SIGNIFICANT CHANGE UP (ref 10.5–13.4)
RBC # BLD: 4.68 M/UL — SIGNIFICANT CHANGE UP (ref 4.2–5.8)
RBC # BLD: 4.73 M/UL
RBC # FLD: 19.8 % — HIGH (ref 10.3–14.5)
RBC # FLD: 21.3 %
RBC CASTS # UR COMP ASSIST: ABNORMAL /HPF
RH IG SCN BLD-IMP: POSITIVE — SIGNIFICANT CHANGE UP
SARS-COV-2 RNA SPEC QL NAA+PROBE: NEGATIVE — SIGNIFICANT CHANGE UP
SODIUM SERPL-SCNC: 135 MMOL/L — SIGNIFICANT CHANGE UP (ref 135–145)
SODIUM SERPL-SCNC: 137 MMOL/L
SP GR SPEC: >=1.03 — SIGNIFICANT CHANGE UP (ref 1–1.03)
UROBILINOGEN FLD QL: 0.2 E.U./DL — SIGNIFICANT CHANGE UP
WBC # BLD: 20.13 K/UL — HIGH (ref 3.8–10.5)
WBC # FLD AUTO: 13.41 K/UL
WBC # FLD AUTO: 20.13 K/UL — HIGH (ref 3.8–10.5)
WBC UR QL: ABNORMAL /HPF

## 2022-10-18 PROCEDURE — 99285 EMERGENCY DEPT VISIT HI MDM: CPT

## 2022-10-18 PROCEDURE — 82565 ASSAY OF CREATININE: CPT

## 2022-10-18 PROCEDURE — 71045 X-RAY EXAM CHEST 1 VIEW: CPT | Mod: 26,76

## 2022-10-18 PROCEDURE — 74178 CT ABD&PLV WO CNTR FLWD CNTR: CPT | Mod: 26

## 2022-10-18 PROCEDURE — 74178 CT ABD&PLV WO CNTR FLWD CNTR: CPT

## 2022-10-18 RX ORDER — PIPERACILLIN AND TAZOBACTAM 4; .5 G/20ML; G/20ML
3.38 INJECTION, POWDER, LYOPHILIZED, FOR SOLUTION INTRAVENOUS EVERY 8 HOURS
Refills: 0 | Status: DISCONTINUED | OUTPATIENT
Start: 2022-10-19 | End: 2022-10-21

## 2022-10-18 RX ORDER — PIPERACILLIN AND TAZOBACTAM 4; .5 G/20ML; G/20ML
3.38 INJECTION, POWDER, LYOPHILIZED, FOR SOLUTION INTRAVENOUS ONCE
Refills: 0 | Status: COMPLETED | OUTPATIENT
Start: 2022-10-19 | End: 2022-10-19

## 2022-10-18 RX ORDER — SODIUM CHLORIDE 9 MG/ML
1000 INJECTION, SOLUTION INTRAVENOUS
Refills: 0 | Status: DISCONTINUED | OUTPATIENT
Start: 2022-10-18 | End: 2022-10-19

## 2022-10-18 RX ORDER — HEPARIN SODIUM 5000 [USP'U]/ML
5000 INJECTION INTRAVENOUS; SUBCUTANEOUS ONCE
Refills: 0 | Status: COMPLETED | OUTPATIENT
Start: 2022-10-18 | End: 2022-10-18

## 2022-10-18 RX ORDER — PIPERACILLIN AND TAZOBACTAM 4; .5 G/20ML; G/20ML
3.38 INJECTION, POWDER, LYOPHILIZED, FOR SOLUTION INTRAVENOUS ONCE
Refills: 0 | Status: COMPLETED | OUTPATIENT
Start: 2022-10-18 | End: 2022-10-18

## 2022-10-18 RX ORDER — ONDANSETRON 8 MG/1
4 TABLET, FILM COATED ORAL EVERY 6 HOURS
Refills: 0 | Status: DISCONTINUED | OUTPATIENT
Start: 2022-10-18 | End: 2022-11-01

## 2022-10-18 RX ADMIN — PIPERACILLIN AND TAZOBACTAM 200 GRAM(S): 4; .5 INJECTION, POWDER, LYOPHILIZED, FOR SOLUTION INTRAVENOUS at 22:23

## 2022-10-18 RX ADMIN — SODIUM CHLORIDE 120 MILLILITER(S): 9 INJECTION, SOLUTION INTRAVENOUS at 23:58

## 2022-10-18 RX ADMIN — HEPARIN SODIUM 5000 UNIT(S): 5000 INJECTION INTRAVENOUS; SUBCUTANEOUS at 22:23

## 2022-10-18 NOTE — HISTORY OF PRESENT ILLNESS
[FreeTextEntry1] : 10/17/22 Postop followup. Patient underwent exlap with colon resection and resection of colovesical fistula, bladder diverticulum, removal of bladder stones and bilateral ureteral stents 10/3/22. ROSA output was high with high ROSA creatinine consistent with urine leak, discharged home with Degroot and ROSA to gravity drainage. ROSA has had minimal output since discharge. C/o fatigue. Denies fever/chills/abdominal pain/wound problems.

## 2022-10-18 NOTE — H&P ADULT - ASSESSMENT
68 yo M with PMH HTN, HLD, asthma, diverticulitis with colovescicular fistula and colon cancer s/p R hemicolectomy (2016) and now s/p exploratory laparotomy, segmental colon resection, resection of colovesicular fistula, partial cystectomy, bladder diverticulectomy, colorectal anastomosis, rigid sigmoidoscopy, loop ileostomy creation (10/06/22) complicated by a bladder leak (elevated Cr in ROSA) presenting with decreased ileostomy output and feeling bloated. Sent into the ED after findings of obstruction on CT scan. In th ED afeb, VSS, WBC 20. CT scan with SBO and complex fluid collection within the anterior pelvis measuring 10 by 5.6 cm. Admitted for further management.       Admit to regional   NPO/IVF  NGT to LIWS  Pain/Nausea control PRN   Zosyn  HSQ 1 dose/SCD  IS/OOBA  AM labs  UA  ROSA drain to low continuous wall suction  IR consult  f/u  plan  Plans discussed with chief resident and attending

## 2022-10-18 NOTE — ASSESSMENT
[FreeTextEntry1] : 66yo male with advanced colon cancer invading bladder with large symptomatic colovesical fistula s/p colon and colovesical fistula resection with complex bladder closure, bilateral ureteral stents 10/3\par \par Check labs today\par Scant ROSA output\par Can possible remove ROSA pending labs \par F/u 2 days

## 2022-10-18 NOTE — ED ADULT TRIAGE NOTE - ARRIVAL INFO ADDITIONAL COMMENTS
Pt sent from [facility] Radiology For Bowel Obstruction found on CT today. Pt denies pain, n/v/d at this time. Pt has Degroot and colostomy in place.

## 2022-10-18 NOTE — ED PROVIDER NOTE - OBJECTIVE STATEMENT
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  Pt follows with Dr. Torre and Dr. Zhang. 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  Pt c/o bloating and decreased PO intake. Sent in for admission to Dr. Zhang.

## 2022-10-18 NOTE — H&P ADULT - NSHPLABSRESULTS_GEN_ALL_CORE
.  LABS:                         11.8   20.13 )-----------( 669      ( 18 Oct 2022 21:05 )             37.1     10-18    135  |  93<L>  |  22  ----------------------------<  113<H>  5.0   |  31  |  0.78    Ca    10.8<H>      18 Oct 2022 21:05    TPro  6.7  /  Alb  3.9  /  TBili  0.4  /  DBili  x   /  AST  39  /  ALT  58<H>  /  AlkPhos  123<H>  10-18    PT/INR - ( 18 Oct 2022 21:05 )   PT: 11.8 sec;   INR: 0.99          PTT - ( 18 Oct 2022 21:05 )  PTT:20.6 sec      Lactate, Blood: 1.5 mmol/L (10-18 @ 21:05)      RADIOLOGY, EKG & ADDITIONAL TESTS: Reviewed.

## 2022-10-18 NOTE — REVIEW OF SYSTEMS
[Fever] : no fever [Chills] : no chills [Feeling Tired] : feeling tired [Abdominal Pain] : no abdominal pain

## 2022-10-18 NOTE — ED PROVIDER NOTE - CLINICAL SUMMARY MEDICAL DECISION MAKING FREE TEXT BOX
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  Pt c/o bloating and decreased PO intake. Sent in for admission to Dr. Zhang.   VSS, pt clinically obstructed, preop labs sent and pt seen by surgery who requests admission to Dr. Beckett for further mgmt. Surgery to place NGT on ER.

## 2022-10-18 NOTE — H&P ADULT - NSICDXPASTSURGICALHX_GEN_ALL_CORE_FT
PAST SURGICAL HISTORY:  Enterovesical fistula closure with cystotomy    H/O left inguinal hernia repair     S/P colon resection     S/P exploratory laparotomy     S/P right hemicolectomy colon cancer    S/P ureteral stent placement

## 2022-10-18 NOTE — ED ADULT NURSE REASSESSMENT NOTE - NS ED NURSE REASSESS COMMENT FT1
NG tube inserted into R nare. Placement confirmed w CXR. NG tube inserted into L nare. Placement confirmed w CXR.

## 2022-10-18 NOTE — PHYSICAL EXAM
[General Appearance - In No Acute Distress] : no acute distress [FreeTextEntry1] : Incision c/d/i, ostomy with liquid stool, Degroot draining clear, scant ROSA output

## 2022-10-18 NOTE — H&P ADULT - NSHPPHYSICALEXAM_GEN_ALL_CORE
LOS:     VITALS:   T(C): 36.4 (10-18-22 @ 20:04), Max: 36.4 (10-18-22 @ 20:04)  HR: 91 (10-18-22 @ 20:04) (91 - 91)  BP: 123/81 (10-18-22 @ 20:04) (123/81 - 123/81)  RR: 18 (10-18-22 @ 20:04) (18 - 18)  SpO2: 99% (10-18-22 @ 20:04) (99% - 99%)    General: NAD, resting comfortably  HEENT: NC/AT  Pulmonary: normal resp effort  Cardiovascular: NSR,  Abdominal: soft, minimally distended, tender on the left side and epigastric region, midline incision with staples with minimal erythema - clean, no drainage, ROSA drain - yellow output, ostomy - pink, minimal liquid output in bag, some gas.  Extremities: WWP, normal strength, no clubbing/cyanosis/edema  Neuro: A/O x 3, no focal deficits

## 2022-10-18 NOTE — ED ADULT NURSE NOTE - NSICDXFAMILYHX_GEN_ALL_CORE_FT
FAMILY HISTORY:  Father  Still living? Unknown  FH: bladder cancer, Age at diagnosis: Age Unknown  FH: kidney cancer, Age at diagnosis: Age Unknown  FH: liver cancer, Age at diagnosis: Age Unknown  FH: prostate cancer, Age at diagnosis: Age Unknown    Mother  Still living? Unknown  FH: breast cancer, Age at diagnosis: Age Unknown    Sibling  Still living? Unknown  FH: breast cancer, Age at diagnosis: Age Unknown  FH: multiple myeloma, Age at diagnosis: Age Unknown    
never used

## 2022-10-18 NOTE — ED PROVIDER NOTE - PHYSICAL EXAMINATION
GEN: Elderly, chronically ill appearing, thin, awake, alert, oriented to person, place, time/situation and appears weak and nauseous distress.  ENT: Airway patent, Nasal mucosa clear. Mouth with normal mucosa.  EYES: Clear bilaterally. PERRL, EOMI  RESPIRATORY: Breathing comfortably with normal RR.    CARDIAC: Regular rate and rhythm, no M/R/G  ABDOMEN: Distended and diffusely TTP, hypoactive BS.  Please refer to surgery note for detailed exam.   MSK: Range of motion is not limited, no deformities noted.  NEURO: Alert and oriented, no focal deficits.  SKIN: Skin normal color for race, warm, dry and intact. No evidence of rash.  PSYCH: Alert and oriented to person, place, time/situation. normal mood and affect. no apparent risk to self or others.

## 2022-10-18 NOTE — ED ADULT NURSE NOTE - OBJECTIVE STATEMENT
Pt is 67M sent from radiology s/p CT showing bowel obstruction. Pt presents w urethral catheter est. insert x2wks ago, and colostomy bag inserted x1wk ago d/t diverticulitis and fistula. Pt states has had decreased appetite, abdominal bloating, decreased ostomy output last 24hrs. Pt states usually changes ostomy bag [id number]/day, has only changed once in past 24hrs. Pain is localized surrounding ostomy site, tender on palpation. Pt denies nausea, vomiting. Ambulates w walker  Neuro: A&Ox4  Resp: Rm air, spontaneous unlabroed resps  GI: Ostomy, Scant amt ostomy output green, soft. Ostomy beefy red.   : indwelling urethral catheter Pt is 67M sent from radiology s/p CT showing bowel obstruction. Pt presents w urethral catheter est. insert x2wks ago, and colostomy bag inserted x1wk ago d/t diverticulitis and fistula. Pt states has had decreased appetite, abdominal bloating, decreased ostomy output last 24hrs. Pt states usually changes ostomy bag u4xzlot/day, has only changed once in past 24hrs. Pain is localized surrounding ostomy site, tender on palpation. Pt denies nausea, vomiting. Ambulates w walker  Neuro: A&Ox4  Resp: Rm air, spontaneous unlabroed resps  GI: Ostomy, Scant amt ostomy output green, soft. Ostomy beefy red. Surgical drain to LLQ draining sanguinous liquid to leg bag.  : indwelling urethral catheter

## 2022-10-18 NOTE — ED ADULT NURSE REASSESSMENT NOTE - NS ED NURSE REASSESS COMMENT FT1
Pt butterflied for blood sample and specimen sent to lab. Surgery at bedside for eval and NG tube insertion.

## 2022-10-19 ENCOUNTER — APPOINTMENT (OUTPATIENT)
Dept: UROLOGY | Facility: CLINIC | Age: 67
End: 2022-10-19

## 2022-10-19 LAB
ALBUMIN SERPL ELPH-MCNC: 3.7 G/DL — SIGNIFICANT CHANGE UP (ref 3.3–5)
ALP SERPL-CCNC: 115 U/L — SIGNIFICANT CHANGE UP (ref 40–120)
ALT FLD-CCNC: 52 U/L — HIGH (ref 10–45)
ANION GAP SERPL CALC-SCNC: 11 MMOL/L — SIGNIFICANT CHANGE UP (ref 5–17)
APTT BLD: 25.5 SEC — LOW (ref 27.5–35.5)
AST SERPL-CCNC: 35 U/L — SIGNIFICANT CHANGE UP (ref 10–40)
BILIRUB SERPL-MCNC: 0.5 MG/DL — SIGNIFICANT CHANGE UP (ref 0.2–1.2)
BLD GP AB SCN SERPL QL: NEGATIVE — SIGNIFICANT CHANGE UP
BUN SERPL-MCNC: 22 MG/DL — SIGNIFICANT CHANGE UP (ref 7–23)
CALCIUM SERPL-MCNC: 10 MG/DL — SIGNIFICANT CHANGE UP (ref 8.4–10.5)
CHLORIDE SERPL-SCNC: 94 MMOL/L — LOW (ref 96–108)
CO2 SERPL-SCNC: 31 MMOL/L — SIGNIFICANT CHANGE UP (ref 22–31)
CREAT SERPL-MCNC: 0.73 MG/DL — SIGNIFICANT CHANGE UP (ref 0.5–1.3)
EGFR: 99 ML/MIN/1.73M2 — SIGNIFICANT CHANGE UP
GLUCOSE SERPL-MCNC: 106 MG/DL — HIGH (ref 70–99)
GRAM STN FLD: SIGNIFICANT CHANGE UP
HCT VFR BLD CALC: 31.9 % — LOW (ref 39–50)
HGB BLD-MCNC: 10.1 G/DL — LOW (ref 13–17)
INR BLD: 0.99 — SIGNIFICANT CHANGE UP (ref 0.88–1.16)
MAGNESIUM SERPL-MCNC: 2 MG/DL — SIGNIFICANT CHANGE UP (ref 1.6–2.6)
MCHC RBC-ENTMCNC: 25.1 PG — LOW (ref 27–34)
MCHC RBC-ENTMCNC: 31.7 GM/DL — LOW (ref 32–36)
MCV RBC AUTO: 79.2 FL — LOW (ref 80–100)
NRBC # BLD: 0 /100 WBCS — SIGNIFICANT CHANGE UP (ref 0–0)
PHOSPHATE SERPL-MCNC: 4.9 MG/DL — HIGH (ref 2.5–4.5)
PLATELET # BLD AUTO: 566 K/UL — HIGH (ref 150–400)
POTASSIUM SERPL-MCNC: 4.1 MMOL/L — SIGNIFICANT CHANGE UP (ref 3.5–5.3)
POTASSIUM SERPL-SCNC: 4.1 MMOL/L — SIGNIFICANT CHANGE UP (ref 3.5–5.3)
PROT SERPL-MCNC: 5.9 G/DL — LOW (ref 6–8.3)
PROTHROM AB SERPL-ACNC: 11.8 SEC — SIGNIFICANT CHANGE UP (ref 10.5–13.4)
RBC # BLD: 4.03 M/UL — LOW (ref 4.2–5.8)
RBC # FLD: 19.9 % — HIGH (ref 10.3–14.5)
RH IG SCN BLD-IMP: POSITIVE — SIGNIFICANT CHANGE UP
SODIUM SERPL-SCNC: 136 MMOL/L — SIGNIFICANT CHANGE UP (ref 135–145)
SPECIMEN SOURCE: SIGNIFICANT CHANGE UP
SURGICAL PATHOLOGY STUDY: SIGNIFICANT CHANGE UP
WBC # BLD: 11.01 K/UL — HIGH (ref 3.8–10.5)
WBC # FLD AUTO: 11.01 K/UL — HIGH (ref 3.8–10.5)

## 2022-10-19 PROCEDURE — 10160 PNXR ASPIR ABSC HMTMA BULLA: CPT

## 2022-10-19 PROCEDURE — 71045 X-RAY EXAM CHEST 1 VIEW: CPT | Mod: 26,76

## 2022-10-19 PROCEDURE — 77012 CT SCAN FOR NEEDLE BIOPSY: CPT | Mod: 26

## 2022-10-19 RX ORDER — PANTOPRAZOLE SODIUM 20 MG/1
40 TABLET, DELAYED RELEASE ORAL EVERY 12 HOURS
Refills: 0 | Status: DISCONTINUED | OUTPATIENT
Start: 2022-10-19 | End: 2022-10-21

## 2022-10-19 RX ORDER — INFLUENZA VIRUS VACCINE 15; 15; 15; 15 UG/.5ML; UG/.5ML; UG/.5ML; UG/.5ML
0.7 SUSPENSION INTRAMUSCULAR ONCE
Refills: 0 | Status: DISCONTINUED | OUTPATIENT
Start: 2022-10-19 | End: 2022-11-01

## 2022-10-19 RX ORDER — SODIUM CHLORIDE 9 MG/ML
1000 INJECTION, SOLUTION INTRAVENOUS
Refills: 0 | Status: DISCONTINUED | OUTPATIENT
Start: 2022-10-19 | End: 2022-10-21

## 2022-10-19 RX ORDER — HEPARIN SODIUM 5000 [USP'U]/ML
5000 INJECTION INTRAVENOUS; SUBCUTANEOUS EVERY 8 HOURS
Refills: 0 | Status: DISCONTINUED | OUTPATIENT
Start: 2022-10-19 | End: 2022-10-24

## 2022-10-19 RX ORDER — SODIUM CHLORIDE 9 MG/ML
1000 INJECTION INTRAMUSCULAR; INTRAVENOUS; SUBCUTANEOUS
Refills: 0 | Status: DISCONTINUED | OUTPATIENT
Start: 2022-10-19 | End: 2022-10-19

## 2022-10-19 RX ORDER — SODIUM CHLORIDE 9 MG/ML
1000 INJECTION, SOLUTION INTRAVENOUS
Refills: 0 | Status: DISCONTINUED | OUTPATIENT
Start: 2022-10-19 | End: 2022-10-19

## 2022-10-19 RX ADMIN — PIPERACILLIN AND TAZOBACTAM 25 GRAM(S): 4; .5 INJECTION, POWDER, LYOPHILIZED, FOR SOLUTION INTRAVENOUS at 02:02

## 2022-10-19 RX ADMIN — SODIUM CHLORIDE 120 MILLILITER(S): 9 INJECTION INTRAMUSCULAR; INTRAVENOUS; SUBCUTANEOUS at 04:18

## 2022-10-19 RX ADMIN — PIPERACILLIN AND TAZOBACTAM 25 GRAM(S): 4; .5 INJECTION, POWDER, LYOPHILIZED, FOR SOLUTION INTRAVENOUS at 18:33

## 2022-10-19 RX ADMIN — HEPARIN SODIUM 5000 UNIT(S): 5000 INJECTION INTRAVENOUS; SUBCUTANEOUS at 21:21

## 2022-10-19 RX ADMIN — SODIUM CHLORIDE 115 MILLILITER(S): 9 INJECTION, SOLUTION INTRAVENOUS at 08:34

## 2022-10-19 RX ADMIN — PANTOPRAZOLE SODIUM 40 MILLIGRAM(S): 20 TABLET, DELAYED RELEASE ORAL at 18:34

## 2022-10-19 RX ADMIN — PIPERACILLIN AND TAZOBACTAM 25 GRAM(S): 4; .5 INJECTION, POWDER, LYOPHILIZED, FOR SOLUTION INTRAVENOUS at 07:11

## 2022-10-19 NOTE — PROGRESS NOTE ADULT - ASSESSMENT
66 yo M with PMH HTN, HLD, asthma, diverticulitis with colovescicular fistula and colon cancer s/p R hemicolectomy (2016) and now s/p exploratory laparotomy, segmental colon resection, resection of colovesicular fistula, partial cystectomy, bladder diverticulectomy, colorectal anastomosis, rigid sigmoidoscopy, loop ileostomy creation (10/06/22) complicated by a bladder leak (elevated Cr in ROSA) presenting with decreased ileostomy output and feeling bloated. Sent into the ED after findings of obstruction on CT scan. In th ED afeb, VSS, WBC 20. CT scan with SBO and complex fluid collection within the anterior pelvis measuring 10 by 5.6 cm. Admitted for further managemnet.      NPO/IVF  NGT to LIWS  Pain/Nausea control PRN   Zosyn  HSQ 1 dose/SCD  IS/OOBA  AM labs  UA  ROSA drain to low continuous wall suction  IR consult  f/u  plan  Plans discussed with chief resident and attending   68 yo M with PMH HTN, HLD, asthma, diverticulitis with colovescicular fistula and colon cancer s/p R hemicolectomy (2016) and now s/p exploratory laparotomy, segmental colon resection, resection of colovesicular fistula, partial cystectomy, bladder diverticulectomy, colorectal anastomosis, rigid sigmoidoscopy, loop ileostomy creation (10/06/22) complicated by a bladder leak (elevated Cr in ROSA) presenting with decreased ileostomy output and feeling bloated. Patient was sent to the ED after findings of obstruction on CT scan. In th ED afeb, VSS, WBC 20. CT scan with SBO and complex fluid collection within the anterior pelvis measuring 10 by 5.6 cm. Patient admitted, NG tube in place with     NPO/IVF  NGT to LIWS  Pain/Nausea control PRN   Zosyn  HSQ 1 dose/SCD  IS/OOBA  AM labs  UA  ROSA drain to low continuous wall suction  IR consult  f/u  plan  Plans discussed with chief resident and attending   68 yo M with PMH HTN, HLD, asthma, diverticulitis with colovescicular fistula and colon cancer s/p R hemicolectomy (2016) and now s/p exploratory laparotomy, segmental colon resection, resection of colovesicular fistula, partial cystectomy, bladder diverticulectomy, colorectal anastomosis, rigid sigmoidoscopy, loop ileostomy creation (10/06/22) complicated by a bladder leak (elevated Cr in ROSA) presenting with decreased ileostomy output and feeling bloated. Patient was sent to the ED after findings of obstruction on CT scan. In th ED afeb, VSS, WBC 20. CT scan with SBO and complex fluid collection within the anterior pelvis measuring 10 by 5.6 cm. Patient admitted, NG tube placed ON, yepez, ostomy with minimal output.    NPO/IVF D5 1/2NS @115cc/hr  NGT to LIWS  Pain, Tylenol   Nausea control PRN   Zosyn  HSQ 1 dose/SCD  IS/OOBA  AM labs  Send UC  Yepez  ROSA drain to low continuous wall suction  IR consulted with plan for another ROSA drain   plan- resend Cr from new ROSA drain after IR, following     66 yo M with PMH HTN, HLD, asthma, diverticulitis with colovescicular fistula and colon cancer s/p R hemicolectomy (2016) and now s/p exploratory laparotomy, segmental colon resection, resection of colovesicular fistula, partial cystectomy, bladder diverticulectomy, colorectal anastomosis, rigid sigmoidoscopy, loop ileostomy creation (10/06/22) complicated by a bladder leak (elevated Cr in ROSA) presenting with decreased ileostomy output and feeling bloated. Patient was sent to the ED after findings of obstruction on CT scan. In th ED afeb, VSS, WBC 20. CT scan with SBO and complex fluid collection within the anterior pelvis measuring 10 by 5.6 cm. Patient admitted, NG tube placed ON, yepez, ostomy with minimal output.    NPO/IVF D5 1/2NS @115cc/hr  NGT to LIWS  Pain, Tylenol   Nausea control PRN   Zosyn  Hold ScHerparin for IR  SCD   IS/OOBA  AM labs  Send UC  Yepez  ROSA drain to low continuous wall suction  IR consulted with plan for another ROSA drain   plan- resend Cr from new ROSA drain after IR, following

## 2022-10-19 NOTE — PHYSICAL THERAPY INITIAL EVALUATION ADULT - PERTINENT HX OF CURRENT PROBLEM, REHAB EVAL
66 yo M with PMH HTN, HLD, asthma, diverticulitis with colovescicular fistula and colon cancer s/p R hemicolectomy (2016) and now s/p exploratory laparotomy, segmental colon resection, resection of colovesicular fistula, partial cystectomy, bladder diverticulectomy, colorectal anastomosis, rigid sigmoidoscopy, loop ileostomy creation (10/06/22) complicated by a bladder leak (elevated Cr in ROSA) presenting with decreased ileostomy output and feeling bloated. Sent into the ED after findings of obstruction on CT scan. In th ED afeb, VSS, WBC 20. CT scan with SBO and complex fluid collection within the anterior pelvis measuring 10 by 5.6 cm. Admitted for further management.

## 2022-10-19 NOTE — PATIENT PROFILE ADULT - FALL HARM RISK - RISK INTERVENTIONS

## 2022-10-19 NOTE — PROGRESS NOTE ADULT - SUBJECTIVE AND OBJECTIVE BOX
SUBJECTIVE: Patient seen and examined bedside by chief resident, ON patient was admitted for an bowel obstruction. NG tube was placed, position confirmed with CXr on LIWS. Patient pain well controlled, -N/-V. Ostomy bag with gas, without output. Tolerating yepez and ROSA drain. Denies CP/SOB/ESTRADA/palpations.     piperacillin/tazobactam IVPB.. 3.375 Gram(s) IV Intermittent every 8 hours      Vital Signs Last 24 Hrs  T(C): 37.1 (19 Oct 2022 04:53), Max: 37.1 (19 Oct 2022 04:53)  T(F): 98.7 (19 Oct 2022 04:53), Max: 98.7 (19 Oct 2022 04:53)  HR: 85 (19 Oct 2022 04:53) (73 - 91)  BP: 130/84 (19 Oct 2022 04:53) (123/81 - 155/84)  BP(mean): --  RR: 17 (19 Oct 2022 04:53) (17 - 18)  SpO2: 94% (19 Oct 2022 04:53) (94% - 99%)    Parameters below as of 19 Oct 2022 04:53  Patient On (Oxygen Delivery Method): room air      I&O's Detail    18 Oct 2022 07:01  -  19 Oct 2022 07:00  --------------------------------------------------------  IN:    Lactated Ringers: 360 mL  Total IN: 360 mL    OUT:    Bulb (mL): 0 mL    Indwelling Catheter - Urethral (mL): 0 mL    Nasogastric/Oral tube (mL): 900 mL  Total OUT: 900 mL    Total NET: -540 mL          General: NAD, resting comfortably in bed  C/V: NSR  Pulm: Nonlabored breathing, no respiratory distress  Abd: soft, mildly distended, Tender near ROSA drain (LLQ)  NG: In place, connected to suction.   ROSA: LLQ, connected to suction with urine output.  Ostomy; P/P/P, +gas, minimally output  Yepez: Dark Yellow urine  Extrem: WWP,  SCDs in place        LABS:                        10.1   11.01 )-----------( 566      ( 19 Oct 2022 05:45 )             31.9     10-19    136  |  94<L>  |  22  ----------------------------<  106<H>  4.1   |  31  |  0.73    Ca    10.0      19 Oct 2022 05:45  Phos  4.9     10-19  Mg     2.0     10-19    TPro  5.9<L>  /  Alb  3.7  /  TBili  0.5  /  DBili  x   /  AST  35  /  ALT  52<H>  /  AlkPhos  115  10-19    PT/INR - ( 19 Oct 2022 05:45 )   PT: 11.8 sec;   INR: 0.99          PTT - ( 19 Oct 2022 05:45 )  PTT:25.5 sec  Urinalysis Basic - ( 18 Oct 2022 22:41 )    Color: Yellow / Appearance: Cloudy / SG: >=1.030 / pH: x  Gluc: x / Ketone: NEGATIVE  / Bili: Negative / Urobili: 0.2 E.U./dL   Blood: x / Protein: 100 mg/dL / Nitrite: NEGATIVE   Leuk Esterase: Moderate / RBC: Many /HPF / WBC Many /HPF   Sq Epi: x / Non Sq Epi: 0-5 /HPF / Bacteria: Present /HPF        RADIOLOGY & ADDITIONAL STUDIES:   SUBJECTIVE: Patient seen and examined bedside by chief resident, ON patient was admitted for an SBO and +pelvic fluid collection. NG tube was placed, position confirmed with CXr on NG on LIWS. Patient pain well controlled, -N/-V. Ostomy bag with gas, without output. Tolerating yepez and ROSA drain. Denies CP/SOB/ESTRADA/palpations.     piperacillin/tazobactam IVPB.. 3.375 Gram(s) IV Intermittent every 8 hours      Vital Signs Last 24 Hrs  T(C): 37.1 (19 Oct 2022 04:53), Max: 37.1 (19 Oct 2022 04:53)  T(F): 98.7 (19 Oct 2022 04:53), Max: 98.7 (19 Oct 2022 04:53)  HR: 85 (19 Oct 2022 04:53) (73 - 91)  BP: 130/84 (19 Oct 2022 04:53) (123/81 - 155/84)  BP(mean): --  RR: 17 (19 Oct 2022 04:53) (17 - 18)  SpO2: 94% (19 Oct 2022 04:53) (94% - 99%)    Parameters below as of 19 Oct 2022 04:53  Patient On (Oxygen Delivery Method): room air      I&O's Detail    18 Oct 2022 07:01  -  19 Oct 2022 07:00  --------------------------------------------------------  IN:    Lactated Ringers: 360 mL  Total IN: 360 mL    OUT:    Bulb (mL): 0 mL    Indwelling Catheter - Urethral (mL): 0 mL    Nasogastric/Oral tube (mL): 900 mL  Total OUT: 900 mL    Total NET: -540 mL          General: NAD, resting comfortably in bed  C/V: NSR  Pulm: Nonlabored breathing, no respiratory distress  Abd: soft, mildly distended, Tender near ROSA drain (LLQ)  NG: In place, connected to suction.   ROSA: LLQ, connected to suction with urine output.  Ostomy; P/P/P, +gas, minimally output  Yepez: Dark Yellow urine  Extrem: WWP,  SCDs in place        LABS:                        10.1   11.01 )-----------( 566      ( 19 Oct 2022 05:45 )             31.9     10-19    136  |  94<L>  |  22  ----------------------------<  106<H>  4.1   |  31  |  0.73    Ca    10.0      19 Oct 2022 05:45  Phos  4.9     10-19  Mg     2.0     10-19    TPro  5.9<L>  /  Alb  3.7  /  TBili  0.5  /  DBili  x   /  AST  35  /  ALT  52<H>  /  AlkPhos  115  10-19    PT/INR - ( 19 Oct 2022 05:45 )   PT: 11.8 sec;   INR: 0.99          PTT - ( 19 Oct 2022 05:45 )  PTT:25.5 sec  Urinalysis Basic - ( 18 Oct 2022 22:41 )    Color: Yellow / Appearance: Cloudy / SG: >=1.030 / pH: x  Gluc: x / Ketone: NEGATIVE  / Bili: Negative / Urobili: 0.2 E.U./dL   Blood: x / Protein: 100 mg/dL / Nitrite: NEGATIVE   Leuk Esterase: Moderate / RBC: Many /HPF / WBC Many /HPF   Sq Epi: x / Non Sq Epi: 0-5 /HPF / Bacteria: Present /HPF        RADIOLOGY & ADDITIONAL STUDIES:

## 2022-10-19 NOTE — PROGRESS NOTE ADULT - SUBJECTIVE AND OBJECTIVE BOX
abd distention   ostomy output stopped   decreased drain output  CT with SBO  Pelvic fluid collection  Increased WBC  AVSS  Abd distended  stoma [place]  wounds OK  BUN/CR normal but elevated from baseline    URO consult  Zosyn  IR drainage  NPO  NGT/IVF  Drain to suction

## 2022-10-19 NOTE — PHYSICAL THERAPY INITIAL EVALUATION ADULT - ADDITIONAL COMMENTS
Pt resides in elevator building, no device use or DME prior to admission. Pt resides in elevator building, use RW for ambulation. Was supposed to have HPT but became to ill.

## 2022-10-19 NOTE — CONSULT NOTE ADULT - ASSESSMENT
Assessment: 66 yo M with PMH HTN, HLD, asthma, diverticulitis with colovesicular fistula and colon cancer s/p R hemicolectomy (2016) and now s/p exploratory laparotomy, segmental colon resection, resection of colovesicular fistula, partial cystectomy, bladder diverticulectomy, colorectal anastomosis, rigid sigmoidoscopy, loop ileostomy creation (10/06/22) complicated by a bladder leak (elevated Cr in ROSA) presenting with decreased ileostomy output and feeling bloated. Patient was sent to the ED after findings of obstruction on CT scan. CT scan with SBO and complex fluid collection within the anterior pelvis measuring 10 by 5.6 cm. Patient admitted for further management. IR consulted for pelvic collection drainage. Case reviewed with Dr. Nassar, plan for procedure with sedation and ultrasound guidance.     Recommendations: NPO at midnight prior to procedure with sedation. Please ensure Covid swab is up to date (within last 72 hours).      Communicated with: Chata JOHNSTON

## 2022-10-19 NOTE — CONSULT NOTE ADULT - ASSESSMENT
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 Was discharged and now readmitted 10/18 for high grade SBO found on outpatient imaging with concerns of pelvic collection    Recommendations:  - Keep Degroot catheter while inpatiento  - NPO with NGT per surgery  - pending IR consult and drain placement - please send a Cr on the new drain output  - discussed with attending  - no acute  interventions at this time    CHAN Paz MD (PGY-2)  Consult Urology Resident  Please feel free to reach out on Teams Chat

## 2022-10-19 NOTE — PATIENT PROFILE ADULT - FUNCTIONAL ASSESSMENT - BASIC MOBILITY 6.
4-calculated by average/Not able to assess (calculate score using [facility] averaging method)

## 2022-10-20 LAB
ALBUMIN SERPL ELPH-MCNC: 3.7 G/DL — SIGNIFICANT CHANGE UP (ref 3.3–5)
ALP SERPL-CCNC: 115 U/L — SIGNIFICANT CHANGE UP (ref 40–120)
ALT FLD-CCNC: 74 U/L — HIGH (ref 10–45)
ANION GAP SERPL CALC-SCNC: 9 MMOL/L — SIGNIFICANT CHANGE UP (ref 5–17)
AST SERPL-CCNC: 58 U/L — HIGH (ref 10–40)
BILIRUB SERPL-MCNC: 0.9 MG/DL — SIGNIFICANT CHANGE UP (ref 0.2–1.2)
BUN SERPL-MCNC: 21 MG/DL — SIGNIFICANT CHANGE UP (ref 7–23)
CALCIUM SERPL-MCNC: 10.2 MG/DL — SIGNIFICANT CHANGE UP (ref 8.4–10.5)
CHLORIDE SERPL-SCNC: 95 MMOL/L — LOW (ref 96–108)
CO2 SERPL-SCNC: 33 MMOL/L — HIGH (ref 22–31)
CREAT FLD-MCNC: 22.17 MG/DL — SIGNIFICANT CHANGE UP
CREAT SERPL-MCNC: 0.87 MG/DL — SIGNIFICANT CHANGE UP (ref 0.5–1.3)
CULTURE RESULTS: SIGNIFICANT CHANGE UP
EGFR: 94 ML/MIN/1.73M2 — SIGNIFICANT CHANGE UP
GLUCOSE SERPL-MCNC: 108 MG/DL — HIGH (ref 70–99)
HCT VFR BLD CALC: 31.6 % — LOW (ref 39–50)
HGB BLD-MCNC: 9.8 G/DL — LOW (ref 13–17)
MAGNESIUM SERPL-MCNC: 2.2 MG/DL — SIGNIFICANT CHANGE UP (ref 1.6–2.6)
MCHC RBC-ENTMCNC: 25.1 PG — LOW (ref 27–34)
MCHC RBC-ENTMCNC: 31 GM/DL — LOW (ref 32–36)
MCV RBC AUTO: 80.8 FL — SIGNIFICANT CHANGE UP (ref 80–100)
NRBC # BLD: 0 /100 WBCS — SIGNIFICANT CHANGE UP (ref 0–0)
PHOSPHATE SERPL-MCNC: 4.3 MG/DL — SIGNIFICANT CHANGE UP (ref 2.5–4.5)
PLATELET # BLD AUTO: 512 K/UL — HIGH (ref 150–400)
POTASSIUM SERPL-MCNC: 4 MMOL/L — SIGNIFICANT CHANGE UP (ref 3.5–5.3)
POTASSIUM SERPL-SCNC: 4 MMOL/L — SIGNIFICANT CHANGE UP (ref 3.5–5.3)
PROT SERPL-MCNC: 5.9 G/DL — LOW (ref 6–8.3)
RBC # BLD: 3.91 M/UL — LOW (ref 4.2–5.8)
RBC # FLD: 20.3 % — HIGH (ref 10.3–14.5)
SODIUM SERPL-SCNC: 137 MMOL/L — SIGNIFICANT CHANGE UP (ref 135–145)
SPECIMEN SOURCE: SIGNIFICANT CHANGE UP
WBC # BLD: 11.92 K/UL — HIGH (ref 3.8–10.5)
WBC # FLD AUTO: 11.92 K/UL — HIGH (ref 3.8–10.5)

## 2022-10-20 PROCEDURE — 74019 RADEX ABDOMEN 2 VIEWS: CPT | Mod: 26

## 2022-10-20 PROCEDURE — 76937 US GUIDE VASCULAR ACCESS: CPT | Mod: 26,59

## 2022-10-20 PROCEDURE — 36569 INSJ PICC 5 YR+ W/O IMAGING: CPT

## 2022-10-20 RX ORDER — SODIUM CHLORIDE 9 MG/ML
10 INJECTION INTRAMUSCULAR; INTRAVENOUS; SUBCUTANEOUS
Refills: 0 | Status: DISCONTINUED | OUTPATIENT
Start: 2022-10-20 | End: 2022-11-01

## 2022-10-20 RX ORDER — CHLORHEXIDINE GLUCONATE 213 G/1000ML
1 SOLUTION TOPICAL
Refills: 0 | Status: DISCONTINUED | OUTPATIENT
Start: 2022-10-20 | End: 2022-11-01

## 2022-10-20 RX ADMIN — HEPARIN SODIUM 5000 UNIT(S): 5000 INJECTION INTRAVENOUS; SUBCUTANEOUS at 13:29

## 2022-10-20 RX ADMIN — PIPERACILLIN AND TAZOBACTAM 25 GRAM(S): 4; .5 INJECTION, POWDER, LYOPHILIZED, FOR SOLUTION INTRAVENOUS at 09:31

## 2022-10-20 RX ADMIN — PIPERACILLIN AND TAZOBACTAM 25 GRAM(S): 4; .5 INJECTION, POWDER, LYOPHILIZED, FOR SOLUTION INTRAVENOUS at 17:40

## 2022-10-20 RX ADMIN — PIPERACILLIN AND TAZOBACTAM 25 GRAM(S): 4; .5 INJECTION, POWDER, LYOPHILIZED, FOR SOLUTION INTRAVENOUS at 01:00

## 2022-10-20 RX ADMIN — SODIUM CHLORIDE 115 MILLILITER(S): 9 INJECTION, SOLUTION INTRAVENOUS at 07:45

## 2022-10-20 RX ADMIN — HEPARIN SODIUM 5000 UNIT(S): 5000 INJECTION INTRAVENOUS; SUBCUTANEOUS at 05:05

## 2022-10-20 RX ADMIN — CHLORHEXIDINE GLUCONATE 1 APPLICATION(S): 213 SOLUTION TOPICAL at 13:30

## 2022-10-20 RX ADMIN — HEPARIN SODIUM 5000 UNIT(S): 5000 INJECTION INTRAVENOUS; SUBCUTANEOUS at 21:49

## 2022-10-20 RX ADMIN — PANTOPRAZOLE SODIUM 40 MILLIGRAM(S): 20 TABLET, DELAYED RELEASE ORAL at 17:40

## 2022-10-20 RX ADMIN — PANTOPRAZOLE SODIUM 40 MILLIGRAM(S): 20 TABLET, DELAYED RELEASE ORAL at 05:05

## 2022-10-20 NOTE — DIETITIAN INITIAL EVALUATION ADULT - PERTINENT LABORATORY DATA
10-20    137  |  95<L>  |  21  ----------------------------<  108<H>  4.0   |  33<H>  |  0.87    Ca    10.2      20 Oct 2022 07:29  Phos  4.3     10-20  Mg     2.2     10-20    TPro  5.9<L>  /  Alb  3.7  /  TBili  0.9  /  DBili  x   /  AST  58<H>  /  ALT  74<H>  /  AlkPhos  115  10-20

## 2022-10-20 NOTE — PROGRESS NOTE ADULT - SUBJECTIVE AND OBJECTIVE BOX
UROLOGY PROGRESS NOTE    SUBJECTIVE: Patient seen and examined bedside. Patient denies fevers/chills, tolerating yepez and NGT, reports feeling same as yesterday.     heparin   Injectable 5000 Unit(s) SubCutaneous every 8 hours  piperacillin/tazobactam IVPB.. 3.375 Gram(s) IV Intermittent every 8 hours      Vital Signs Last 24 Hrs  T(C): 36.6 (20 Oct 2022 08:30), Max: 36.8 (20 Oct 2022 00:06)  T(F): 97.8 (20 Oct 2022 08:30), Max: 98.3 (20 Oct 2022 00:06)  HR: 74 (20 Oct 2022 08:30) (74 - 86)  BP: 120/72 (20 Oct 2022 08:30) (113/76 - 122/80)  BP(mean): --  RR: 19 (20 Oct 2022 08:30) (16 - 19)  SpO2: 95% (20 Oct 2022 08:30) (94% - 96%)    Parameters below as of 20 Oct 2022 08:30  Patient On (Oxygen Delivery Method): room air      I&O's Detail    19 Oct 2022 07:01  -  20 Oct 2022 07:00  --------------------------------------------------------  IN:    dextrose 5% + sodium chloride 0.45%: 1150 mL    IV PiggyBack: 275 mL  Total IN: 1425 mL    OUT:    Bulb (mL): 21 mL    Ileostomy (mL): 0 mL    Indwelling Catheter - Urethral (mL): 1075 mL    Nasogastric/Oral tube (mL): 1200 mL    Oral Fluid: 0 mL  Total OUT: 2296 mL    Total NET: -871 mL      20 Oct 2022 07:01  -  20 Oct 2022 09:13  --------------------------------------------------------  IN:    dextrose 5% + sodium chloride 0.45%: 115 mL  Total IN: 115 mL    OUT:  Total OUT: 0 mL    Total NET: 115 mL          PHYSICAL EXAM  General: NAD, resting comfortably in bed, NGT in place draining bilious contents  C/V: NSR  Pulm: Nonlabored breathing, no respiratory distress on room air  Abd: soft, ND, appropriate incisional TTP, mild deep TTP suprapubic region  : yepez draining clear yellow urine. ROSA with yellow fluid  Extrem: WWP, no edema, SCDs in place        LABS:                        9.8    11.92 )-----------( 512      ( 20 Oct 2022 07:29 )             31.6     10-20    137  |  95<L>  |  21  ----------------------------<  108<H>  4.0   |  33<H>  |  0.87    Ca    10.2      20 Oct 2022 07:29  Phos  4.3     10-20  Mg     2.2     10-20    TPro  5.9<L>  /  Alb  3.7  /  TBili  0.9  /  DBili  x   /  AST  58<H>  /  ALT  74<H>  /  AlkPhos  115  10-20    PT/INR - ( 19 Oct 2022 05:45 )   PT: 11.8 sec;   INR: 0.99          PTT - ( 19 Oct 2022 05:45 )  PTT:25.5 sec  Urinalysis Basic - ( 18 Oct 2022 22:41 )    Color: Yellow / Appearance: Cloudy / SG: >=1.030 / pH: x  Gluc: x / Ketone: NEGATIVE  / Bili: Negative / Urobili: 0.2 E.U./dL   Blood: x / Protein: 100 mg/dL / Nitrite: NEGATIVE   Leuk Esterase: Moderate / RBC: Many /HPF / WBC Many /HPF   Sq Epi: x / Non Sq Epi: 0-5 /HPF / Bacteria: Present /HPF        CULTURES:          RADIOLOGY & ADDITIONAL STUDIES:

## 2022-10-20 NOTE — DIETITIAN INITIAL EVALUATION ADULT - ENTER FROM (ML/KG)
Yeast Infection (Candida Vaginal Infection)    You have a Candida vaginal infection. This is also known as a yeast infection. It's most often caused by a type of yeast (fungus) called Candida. Candida are normally found in the vagina. But if they increase in number, this can lead to infection and cause symptoms.   Symptoms of a yeast infection can include:     Clumpy or thin, white discharge, which may look like cottage cheese    Itching or burning    Burning with urination  Certain factors can make a yeast infection more likely. These can include:     Taking certain medicines, such as antibiotics or birth control pills    Pregnancy    Diabetes    Weak immune system  A yeast infection is most often treated with antifungal medicine. This may be given as a vaginal cream or pills you take by mouth. Treatment may last for about 1 to 7 days. Women with severe or recurrent infections may need longer courses of treatment.   Home care    If you re prescribed medicine, be sure to use it as directed. Finish all of the medicine, even if your symptoms go away. Don t try to treat yourself using over-the-counter products without talking with your provider first. They will let you know if this is a good option for you.    Ask your provider what steps you can take to help reduce your risk of having a yeast infection in the future.    Follow-up care  Follow up with your healthcare provider, or as directed.   When to seek medical advice  Call your healthcare provider right away if:     You have a fever of 100.4 F (38 C) or higher, or as directed by your provider.    Your symptoms worsen, or they don t go away within a few days of starting treatment.    You have new pain in the lower belly or pelvic region.    You have side effects that bother you or a reaction to the cream or pills you re prescribed.    You or any partners you have sex with have new symptoms, such as a rash, joint pain, or sores.  [place] last reviewed this  educational content on 7/1/2020 2000-2021 [employer]. All rights reserved. This information is not intended as a substitute for professional medical care. Always follow your healthcare professional's instructions.         30

## 2022-10-20 NOTE — DIETITIAN INITIAL EVALUATION ADULT - ADD RECOMMEND
If continued NPO, recommend TPN via PICC: 100 g AA (1.5 g/kg), 340 g dex, 50 g lipids to provide 2056 kcal, GIR 3.5  >>Start at 150 g dex on day 1, 250 g dex on day 2, & advance to goal of 340 g dex on day 3  >>Fluids & electrolytes per team   >>Monitor triglycerides & LFTs weekly, Mg/K/Phos daily, & POC BG q6hrs  >>Trend daily weights  Pain & bowel regimen per team   Diet edu prn

## 2022-10-20 NOTE — DIETITIAN INITIAL EVALUATION ADULT - PERTINENT MEDS FT
MEDICATIONS  (STANDING):  chlorhexidine 2% Cloths 1 Application(s) Topical <User Schedule>  dextrose 5% + sodium chloride 0.45%. 1000 milliLiter(s) (115 mL/Hr) IV Continuous <Continuous>  heparin   Injectable 5000 Unit(s) SubCutaneous every 8 hours  influenza  Vaccine (HIGH DOSE) 0.7 milliLiter(s) IntraMuscular once  pantoprazole  Injectable 40 milliGRAM(s) IV Push every 12 hours  piperacillin/tazobactam IVPB.. 3.375 Gram(s) IV Intermittent every 8 hours    MEDICATIONS  (PRN):  ondansetron Injectable 4 milliGRAM(s) IV Push every 6 hours PRN Nausea  sodium chloride 0.9% lock flush 10 milliLiter(s) IV Push every 1 hour PRN Pre/post blood products, medications, blood draw, and to maintain line patency

## 2022-10-20 NOTE — PROGRESS NOTE ADULT - SUBJECTIVE AND OBJECTIVE BOX
No collection during drainage procedure  AVSS  Abd soft  nontender  some air in appliance  labs OK  Pathology noted    OOB  NPO  NGT  IVAB  Await urine culture  ONC consult  PICC today  hold off on TPN(beginning to have return of bowel function)

## 2022-10-20 NOTE — DIETITIAN INITIAL EVALUATION ADULT - OTHER CALCULATIONS
Estimated needs based on ABW as pt's weight is % IBW (97%). Needs adjusted for advanced age & malnutrition.   Adjust fluids as needed per team.

## 2022-10-20 NOTE — DIETITIAN NUTRITION RISK NOTIFICATION - TREATMENT: THE FOLLOWING DIET HAS BEEN RECOMMENDED
Diet, NPO     If continued NPO, recommend TPN via PICC: 100 g AA (1.5 g/kg), 340 g dex, 50 g lipids to provide 2056 kcal, GIR 3.5

## 2022-10-20 NOTE — PROGRESS NOTE ADULT - ASSESSMENT
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 Was discharged and now readmitted 10/18 for high grade SBO found on outpatient imaging with concerns of pelvic collection    Recommendations:  - Keep Degroot catheter while inpatient  - NPO with NGT per surgery  - please send Cr on fluid in ROSA to consider removal  - discussed with attending  - no acute  interventions at this time    CHAN Paz MD (PGY-2)  Consult Urology Resident  Please feel free to reach out on Teams Chat

## 2022-10-20 NOTE — DIETITIAN INITIAL EVALUATION ADULT - NUTRITIONGOAL OUTCOME1
Diet will advance within 24-48 hrs via medically feasible route  Consistently meet >75% nutrient needs

## 2022-10-20 NOTE — PROGRESS NOTE ADULT - ASSESSMENT
66 yo M with PMH HTN, HLD, asthma, diverticulitis with colovesicular fistula and colon cancer s/p R hemicolectomy (2016) and now s/p exploratory laparotomy, segmental colon resection, resection of colovesicular fistula, partial cystectomy, bladder diverticulectomy, colorectal anastomosis, rigid sigmoidoscopy, loop ileostomy creation (10/06/22) complicated by a bladder leak (elevated Cr in ROSA) presenting with decreased ileostomy output and feeling bloated. Sent into the ED after findings of obstruction on CT scan. In th ED afeb, VSS, WBC 20. CT scan with SBO and complex fluid collection within the anterior pelvis measuring 10 by 5.6 cm. Admitted for further management.    NPO/IVF  NGT to LIWS  Start PICC/TPN   Pain/Nausea control PRN   Zosyn  SCD/HSQ  IS/OOBA  JPx1  F/u urology recs

## 2022-10-20 NOTE — PROGRESS NOTE ADULT - SUBJECTIVE AND OBJECTIVE BOX
INTERVAL HPI/OVERNIGHT EVENTS: ROSA off wall suction to bulb suction draining serous fluid, ostomy with gas and trace amounts of stool, NGT to LIWS w blood tinged op    SUBJECTIVE: Pt seen and examined at bedside this am by surgery team. Ostomy w/ more gas and liquid stool today, abd remains soft, ttp to ostomy site, unchanged. Denies f/n/v/cp/sob.    MEDICATIONS  (STANDING):  dextrose 5% + sodium chloride 0.45%. 1000 milliLiter(s) (115 mL/Hr) IV Continuous <Continuous>  heparin   Injectable 5000 Unit(s) SubCutaneous every 8 hours  influenza  Vaccine (HIGH DOSE) 0.7 milliLiter(s) IntraMuscular once  pantoprazole  Injectable 40 milliGRAM(s) IV Push every 12 hours  piperacillin/tazobactam IVPB.. 3.375 Gram(s) IV Intermittent every 8 hours    MEDICATIONS  (PRN):  ondansetron Injectable 4 milliGRAM(s) IV Push every 6 hours PRN Nausea    Vital Signs Last 24 Hrs  T(C): 36.7 (20 Oct 2022 05:04), Max: 36.8 (20 Oct 2022 00:06)  T(F): 98.1 (20 Oct 2022 05:04), Max: 98.3 (20 Oct 2022 00:06)  HR: 82 (20 Oct 2022 05:04) (75 - 86)  BP: 122/80 (20 Oct 2022 05:04) (111/71 - 122/80)  BP(mean): --  RR: 17 (20 Oct 2022 05:04) (16 - 18)  SpO2: 96% (20 Oct 2022 05:04) (94% - 97%)    Parameters below as of 20 Oct 2022 05:04  Patient On (Oxygen Delivery Method): room air    PHYSICAL EXAM:    Constitutional: A&Ox3, NAD. NGT in place draining bilious OP    Respiratory: non labored breathing, no respiratory distress    Cardiovascular: NSR, RRR    Gastrointestinal: abdomen soft, mildly distended, ttp to ostomy site. Ostomy w/ stoma pink and viable, gas and liquid stool present in bag. ROSA x 1 to bulb suction w/ minimal OP    Genitourinary: Degroot in place     Extremities: wwp, no calf tenderness or edema. SCDs in place       I&O's Detail    19 Oct 2022 07:01  -  20 Oct 2022 07:00  --------------------------------------------------------  IN:    dextrose 5% + sodium chloride 0.45%: 1150 mL    IV PiggyBack: 275 mL  Total IN: 1425 mL    OUT:    Bulb (mL): 21 mL    Ileostomy (mL): 0 mL    Indwelling Catheter - Urethral (mL): 1075 mL    Nasogastric/Oral tube (mL): 1200 mL    Oral Fluid: 0 mL  Total OUT: 2296 mL    Total NET: -871 mL      20 Oct 2022 07:01  -  20 Oct 2022 08:01  --------------------------------------------------------  IN:    dextrose 5% + sodium chloride 0.45%: 115 mL  Total IN: 115 mL    OUT:  Total OUT: 0 mL    Total NET: 115 mL          LABS:                        9.8    11.92 )-----------( 512      ( 20 Oct 2022 07:29 )             31.6     10-19    136  |  94<L>  |  22  ----------------------------<  106<H>  4.1   |  31  |  0.73    Ca    10.0      19 Oct 2022 05:45  Phos  4.3     10-20  Mg     2.2     10-20    TPro  5.9<L>  /  Alb  3.7  /  TBili  0.5  /  DBili  x   /  AST  35  /  ALT  52<H>  /  AlkPhos  115  10-19    PT/INR - ( 19 Oct 2022 05:45 )   PT: 11.8 sec;   INR: 0.99          PTT - ( 19 Oct 2022 05:45 )  PTT:25.5 sec  Urinalysis Basic - ( 18 Oct 2022 22:41 )    Color: Yellow / Appearance: Cloudy / SG: >=1.030 / pH: x  Gluc: x / Ketone: NEGATIVE  / Bili: Negative / Urobili: 0.2 E.U./dL   Blood: x / Protein: 100 mg/dL / Nitrite: NEGATIVE   Leuk Esterase: Moderate / RBC: Many /HPF / WBC Many /HPF   Sq Epi: x / Non Sq Epi: 0-5 /HPF / Bacteria: Present /HPF        RADIOLOGY & ADDITIONAL STUDIES:

## 2022-10-20 NOTE — DIETITIAN INITIAL EVALUATION ADULT - OTHER INFO
67 yr old M PMH HTN, HLD, asthma, colon cancer s/p laparoscopic right colectomy (2016), chronic diverticulitis with known colovesicular fistula since July 2022, recently admitted (9/19-26) w/ chronic cystitis and bacteremia treated with IV abx, now s/p elective laparoscopic resection of colovesicular fistula, cystotomy closure, and bilateral ureteral stent placement 10/06/2022. Was discharged and now readmitted 10/18 for high grade SBO found on outpatient imaging with concerns of pelvic collection. NGT placed for suction 10/19. PICC line placed 10/20, plan to hold off on TPN as pt is beginning to have return of bowel function.     On assessment, pt awake & alert resting in bed. Denies abd discomfort & distension, no n/v. No pain at this time. Ostomy with liquid stool & flatus today 10/20. Per pt report, appetite & intake poor PTA, 0% intake over past week.  lbs 1 week ago, consistent with admission weight 150 lb. Per previous admission RD note (10/11/2022) pt reported  lbs 09/2022 revealing 20 lb/12% weight loss in 1 month. NFPE significant for moderate & severe wasting. Per ASPEN guidelines, pt meets criteria for severe acute malnutrition - see malnutrition sticker. No cultural, ethnic, Bapt[employer] food preferences noted. NKFA. Surgical incision noted. David score. No edema or PIs noted. Discussed with pt purpose of nutrition support & potential transition to TPN as medically feasible. See nutrition recs.  67 yr old M PMH HTN, HLD, asthma, colon cancer s/p laparoscopic right colectomy (2016), chronic diverticulitis with known colovesicular fistula since July 2022, recently admitted (9/19-26) w/ chronic cystitis and bacteremia treated with IV abx, now s/p elective laparoscopic resection of colovesicular fistula, cystotomy closure, and bilateral ureteral stent placement 10/06/2022. Was discharged and now readmitted 10/18 for high grade SBO found on outpatient imaging with concerns of pelvic collection. NGT placed for suction 10/19. PICC line placed 10/20, plan to hold off on TPN as pt is beginning to have return of bowel function.     On assessment, pt awake & alert resting in bed. Denies abd discomfort & distension, no n/v. No pain at this time. Per flowsheet, no ileostomy output since admission. Per pt report, appetite & intake poor PTA, 0% intake over past week. Reports weight 150 lbs 1 week ago, consistent with admission weight 150 lb. Per previous admission RD note (10/11/2022) pt reported  lbs 09/2022 revealing 20 lb/12% weight loss in 1 month. NFPE significant for moderate & severe wasting. Per ASPEN guidelines, pt meets criteria for severe acute malnutrition - see malnutrition sticker. No cultural, ethnic, [place] food preferences noted. NKFA. Surgical incision noted. David score 19. No edema or PIs noted. Discussed with pt purpose of nutrition support & potential initiation of TPN as medically feasible. See nutrition recs.

## 2022-10-20 NOTE — DIETITIAN INITIAL EVALUATION ADULT - NSFNSGIIOFT_GEN_A_CORE
10-19-22 @ 07:01  -  10-20-22 @ 07:00  --------------------------------------------------------  OUT:    Ileostomy (mL): 0 mL    Nasogastric/Oral tube (mL): 1200 mL  Total OUT: 1200 mL    Total NET: -1200 mL      10-20-22 @ 07:01  -  10-20-22 @ 12:39  --------------------------------------------------------  OUT:    Nasogastric/Oral tube (mL): 200 mL  Total OUT: 200 mL    Total NET: -200 mL

## 2022-10-21 ENCOUNTER — TRANSCRIPTION ENCOUNTER (OUTPATIENT)
Age: 67
End: 2022-10-21

## 2022-10-21 LAB
ALBUMIN SERPL ELPH-MCNC: 3.2 G/DL — LOW (ref 3.3–5)
ALP SERPL-CCNC: 105 U/L — SIGNIFICANT CHANGE UP (ref 40–120)
ALT FLD-CCNC: 88 U/L — HIGH (ref 10–45)
ANION GAP SERPL CALC-SCNC: 8 MMOL/L — SIGNIFICANT CHANGE UP (ref 5–17)
AST SERPL-CCNC: 59 U/L — HIGH (ref 10–40)
BILIRUB SERPL-MCNC: 0.5 MG/DL — SIGNIFICANT CHANGE UP (ref 0.2–1.2)
BUN SERPL-MCNC: 17 MG/DL — SIGNIFICANT CHANGE UP (ref 7–23)
CALCIUM SERPL-MCNC: 10 MG/DL — SIGNIFICANT CHANGE UP (ref 8.4–10.5)
CHLORIDE SERPL-SCNC: 96 MMOL/L — SIGNIFICANT CHANGE UP (ref 96–108)
CO2 SERPL-SCNC: 33 MMOL/L — HIGH (ref 22–31)
CREAT SERPL-MCNC: 0.73 MG/DL — SIGNIFICANT CHANGE UP (ref 0.5–1.3)
CULTURE RESULTS: SIGNIFICANT CHANGE UP
EGFR: 99 ML/MIN/1.73M2 — SIGNIFICANT CHANGE UP
GLUCOSE SERPL-MCNC: 108 MG/DL — HIGH (ref 70–99)
HCT VFR BLD CALC: 29.3 % — LOW (ref 39–50)
HGB BLD-MCNC: 9.2 G/DL — LOW (ref 13–17)
MAGNESIUM SERPL-MCNC: 2.1 MG/DL — SIGNIFICANT CHANGE UP (ref 1.6–2.6)
MCHC RBC-ENTMCNC: 25.6 PG — LOW (ref 27–34)
MCHC RBC-ENTMCNC: 31.4 GM/DL — LOW (ref 32–36)
MCV RBC AUTO: 81.6 FL — SIGNIFICANT CHANGE UP (ref 80–100)
NRBC # BLD: 0 /100 WBCS — SIGNIFICANT CHANGE UP (ref 0–0)
PHOSPHATE SERPL-MCNC: 3 MG/DL — SIGNIFICANT CHANGE UP (ref 2.5–4.5)
PLATELET # BLD AUTO: 435 K/UL — HIGH (ref 150–400)
POTASSIUM SERPL-MCNC: 3.8 MMOL/L — SIGNIFICANT CHANGE UP (ref 3.5–5.3)
POTASSIUM SERPL-SCNC: 3.8 MMOL/L — SIGNIFICANT CHANGE UP (ref 3.5–5.3)
PROT SERPL-MCNC: 5.7 G/DL — LOW (ref 6–8.3)
RBC # BLD: 3.59 M/UL — LOW (ref 4.2–5.8)
RBC # FLD: 19.5 % — HIGH (ref 10.3–14.5)
SODIUM SERPL-SCNC: 137 MMOL/L — SIGNIFICANT CHANGE UP (ref 135–145)
SPECIMEN SOURCE: SIGNIFICANT CHANGE UP
WBC # BLD: 8.03 K/UL — SIGNIFICANT CHANGE UP (ref 3.8–10.5)
WBC # FLD AUTO: 8.03 K/UL — SIGNIFICANT CHANGE UP (ref 3.8–10.5)

## 2022-10-21 PROCEDURE — 99254 IP/OBS CNSLTJ NEW/EST MOD 60: CPT

## 2022-10-21 RX ORDER — SODIUM CHLORIDE 9 MG/ML
1000 INJECTION, SOLUTION INTRAVENOUS
Refills: 0 | Status: DISCONTINUED | OUTPATIENT
Start: 2022-10-21 | End: 2022-10-22

## 2022-10-21 RX ORDER — POTASSIUM CHLORIDE 20 MEQ
10 PACKET (EA) ORAL
Refills: 0 | Status: COMPLETED | OUTPATIENT
Start: 2022-10-21 | End: 2022-10-21

## 2022-10-21 RX ORDER — PANTOPRAZOLE SODIUM 20 MG/1
40 TABLET, DELAYED RELEASE ORAL
Refills: 0 | Status: DISCONTINUED | OUTPATIENT
Start: 2022-10-22 | End: 2022-10-24

## 2022-10-21 RX ADMIN — PANTOPRAZOLE SODIUM 40 MILLIGRAM(S): 20 TABLET, DELAYED RELEASE ORAL at 06:00

## 2022-10-21 RX ADMIN — CHLORHEXIDINE GLUCONATE 1 APPLICATION(S): 213 SOLUTION TOPICAL at 05:59

## 2022-10-21 RX ADMIN — PANTOPRAZOLE SODIUM 40 MILLIGRAM(S): 20 TABLET, DELAYED RELEASE ORAL at 17:06

## 2022-10-21 RX ADMIN — PIPERACILLIN AND TAZOBACTAM 25 GRAM(S): 4; .5 INJECTION, POWDER, LYOPHILIZED, FOR SOLUTION INTRAVENOUS at 01:06

## 2022-10-21 RX ADMIN — Medication 100 MILLIEQUIVALENT(S): at 12:41

## 2022-10-21 RX ADMIN — SODIUM CHLORIDE 115 MILLILITER(S): 9 INJECTION, SOLUTION INTRAVENOUS at 01:06

## 2022-10-21 RX ADMIN — HEPARIN SODIUM 5000 UNIT(S): 5000 INJECTION INTRAVENOUS; SUBCUTANEOUS at 13:15

## 2022-10-21 RX ADMIN — HEPARIN SODIUM 5000 UNIT(S): 5000 INJECTION INTRAVENOUS; SUBCUTANEOUS at 22:17

## 2022-10-21 RX ADMIN — HEPARIN SODIUM 5000 UNIT(S): 5000 INJECTION INTRAVENOUS; SUBCUTANEOUS at 06:00

## 2022-10-21 RX ADMIN — Medication 100 MILLIEQUIVALENT(S): at 16:05

## 2022-10-21 NOTE — PROGRESS NOTE ADULT - SUBJECTIVE AND OBJECTIVE BOX
UROLOGY PROGRESS NOTE    SUBJECTIVE: Patient seen and examined bedside. Patient denies fevers/chills, HA/dizziness, nausea/vomiting, and pain is controlled. Is ambulating, tolerating yepez.     heparin   Injectable 5000 Unit(s) SubCutaneous every 8 hours      Vital Signs Last 24 Hrs  T(C): 36.7 (21 Oct 2022 10:11), Max: 36.8 (20 Oct 2022 13:18)  T(F): 98 (21 Oct 2022 10:11), Max: 98.3 (20 Oct 2022 13:18)  HR: 71 (21 Oct 2022 10:11) (64 - 84)  BP: 116/77 (21 Oct 2022 10:11) (111/81 - 121/74)  BP(mean): --  RR: 18 (21 Oct 2022 10:11) (17 - 18)  SpO2: 98% (21 Oct 2022 10:11) (95% - 98%)    Parameters below as of 21 Oct 2022 10:11  Patient On (Oxygen Delivery Method): room air      I&O's Detail    20 Oct 2022 07:01  -  21 Oct 2022 07:00  --------------------------------------------------------  IN:    dextrose 5% + sodium chloride 0.45%: 1610 mL    IV PiggyBack: 200 mL  Total IN: 1810 mL    OUT:    Bulb (mL): 2 mL    Ileostomy (mL): 0 mL    Indwelling Catheter - Urethral (mL): 800 mL    Nasogastric/Oral tube (mL): 400 mL    Oral Fluid: 0 mL  Total OUT: 1202 mL    Total NET: 608 mL          PHYSICAL EXAM    General: NAD,  sitting in bed, NGT clamped  C/V: NSR  Pulm: Nonlabored breathing, no respiratory distress on room air  Abd: soft, ND, appropriate incisional TTP, mild deep TTP suprapubic region  : yepez draining clear yellow urine. ROSA with yellow fluid  Extrem: WWP, no edema, SCDs in place          LABS:                        9.2    8.03  )-----------( 435      ( 21 Oct 2022 07:27 )             29.3     10-21    137  |  96  |  17  ----------------------------<  108<H>  3.8   |  33<H>  |  0.73    Ca    10.0      21 Oct 2022 07:27  Phos  3.0     10-21  Mg     2.1     10-21    TPro  5.7<L>  /  Alb  3.2<L>  /  TBili  0.5  /  DBili  x   /  AST  59<H>  /  ALT  88<H>  /  AlkPhos  105  10-21          CULTURES:        Culture - Urine (collected 10-19-22 @ 10:59)  Source: Catheterized Catheterized  Final Report (10-20-22 @ 14:43):    >100,000 CFU/ml Candida marciayr Also known as Kluyveromyces marxianus    >100,000 CFU/ml Clavibacter species ( lusitaniae)        RADIOLOGY & ADDITIONAL STUDIES:

## 2022-10-21 NOTE — DISCHARGE NOTE PROVIDER - DETAILS OF MALNUTRITION DIAGNOSIS/DIAGNOSES
This patient has been assessed with a concern for Malnutrition and was treated during this hospitalization for the following Nutrition diagnosis/diagnoses:     -  10/20/2022: Severe protein-calorie malnutrition

## 2022-10-21 NOTE — DISCHARGE NOTE PROVIDER - NSDCCPTREATMENT_GEN_ALL_CORE_FT
PRINCIPAL PROCEDURE  Procedure: Exploratory laparotomy  Findings and Treatment:       SECONDARY PROCEDURE  Procedure: Omentectomy  Findings and Treatment:     Procedure: Small bowel resection  Findings and Treatment:

## 2022-10-21 NOTE — DISCHARGE NOTE PROVIDER - PROVIDER TOKENS
PROVIDER:[TOKEN:[[id number] PROVIDER:[TOKEN:[[id number]]],PROVIDER:[TOKEN:[[id number]]]

## 2022-10-21 NOTE — PROGRESS NOTE ADULT - ASSESSMENT
66 yo M with PMH HTN, HLD, asthma, diverticulitis with colovescicular fistula and colon cancer s/p R hemicolectomy (2016) and now s/p exploratory laparotomy, segmental colon resection, resection of colovesicular fistula, partial cystectomy, bladder diverticulectomy, colorectal anastomosis, rigid sigmoidoscopy, loop ileostomy creation (10/06/22) complicated by a bladder leak (elevated Cr in ROSA) presenting with decreased ileostomy output and feeling bloated. Sent into the ED after findings of obstruction on CT scan. In th ED afeb, VSS, WBC 20. CT scan with SBO and complex fluid collection within the anterior pelvis measuring 10 by 5.6 cm. Admitted for further management.    Advance to Sips + chips  Remove NG tube today  Pain/Nausea control PRN   No antibiotics per ID   SCDs/HSQ  IS/OOBA  JPx1  F/u urology recs

## 2022-10-21 NOTE — PROGRESS NOTE ADULT - SUBJECTIVE AND OBJECTIVE BOX
SUBJECTIVE: Patient seen and examined bedside by chief resident. Patient states pain is controlled. Patient denies n/v. Ostomy with gas and stool.     Vital Signs Last 24 Hrs  T(C): 36.7 (21 Oct 2022 10:11), Max: 36.8 (20 Oct 2022 13:18)  T(F): 98 (21 Oct 2022 10:11), Max: 98.3 (20 Oct 2022 13:18)  HR: 71 (21 Oct 2022 10:11) (64 - 84)  BP: 116/77 (21 Oct 2022 10:11) (111/81 - 121/74)  BP(mean): --  RR: 18 (21 Oct 2022 10:11) (17 - 18)  SpO2: 98% (21 Oct 2022 10:11) (95% - 98%)    Parameters below as of 21 Oct 2022 10:11  Patient On (Oxygen Delivery Method): room air        I&O's Summary    20 Oct 2022 07:01  -  21 Oct 2022 07:00  --------------------------------------------------------  IN: 1810 mL / OUT: 1202 mL / NET: 608 mL        Physical Exam:  General Appearance: AAOx3, NAD, NGT in place   Pulmonary: Nonlabored breathing, no respiratory distress  Cardiovascular: NSR  Abdomen: Soft, mildly distended, appropriate incisional tenderness, ostomy with gas and liquid stool  Extremities: WWP, SCD's in place, no edema         LABS:                        9.2    8.03  )-----------( 435      ( 21 Oct 2022 07:27 )             29.3     10-21    137  |  96  |  17  ----------------------------<  108<H>  3.8   |  33<H>  |  0.73    Ca    10.0      21 Oct 2022 07:27  Phos  3.0     10-21  Mg     2.1     10-21    TPro  5.7<L>  /  Alb  3.2<L>  /  TBili  0.5  /  DBili  x   /  AST  59<H>  /  ALT  88<H>  /  AlkPhos  105  10-21

## 2022-10-21 NOTE — DISCHARGE NOTE PROVIDER - NSDCFUADDINST_GEN_ALL_CORE_FT
Home care: You will be discharged with ROSA drain and yepez catheter. Please empty ROSA drain and catheter at least twice daily and record outputs. Home nursing services will be arranged to assist you in this care.  Home care: You will be discharged with ROSA drain. Please empty ROSA drain and catheter at least twice daily and record outputs. Home nursing services will be arranged to assist you in this care.     Ostomy prescription has been provided and can be obtained from the chart by your nurse. Ostomy prescription has been provided and can be obtained from the chart by your nurse.    Please follow up with Dr. Zhang in one week; you may call the office to make an appointment at your earliest convenience.  Please follow up with your oncologist in one week.  Please follow up with your primary care provider in one week.      General Discharge Instructions:  Please resume all regular home medications unless specifically advised not to take a particular medication. Also, please take any new medications as prescribed.  Please get plenty of rest, continue to ambulate several times per day, and drink adequate amounts of fluids. Avoid lifting weights greater than 5-10 lbs until you follow-up with your surgeon, who will instruct you further regarding activity restrictions.  Avoid driving or operating heavy machinery while taking pain medications.  Please follow-up with your surgeon and Primary Care Provider (PCP) as advised.  *Please call your doctor if you have increased pain.    Warning Signs:  Please call your doctor if you experience the following:  *You experience new chest pain, pressure, squeezing or tightness.  *New or worsening cough, shortness of breath, or wheeze.  *If you are vomiting and cannot keep down fluids or your medications.  *You are getting dehydrated due to continued vomiting, diarrhea, or other reasons. Signs of dehydration include dry mouth, rapid heartbeat, or feeling dizzy or faint when standing.  *You see blood or dark/black material when you vomit or have a bowel movement.  *You experience burning when you urinate, have blood in your urine, or experience a discharge.  *Your pain is not improving within 8-12 hours or is not gone within 24 hours. Call or return immediately if your pain is getting worse, changes location, or moves to your chest or back.  *You have shaking chills, or fever greater than 101.5 degrees Fahrenheit or 38 degrees Celsius.  *Any change in your symptoms, or any new symptoms that concerns you.         Ostomy prescription has been provided and can be obtained from the chart by your nurse.    Please follow up with Dr. Zhang in one week; you may call the office to make an appointment at your earliest convenience.  Please follow up with your oncologist in one week.  Please follow up with your urologist within one week.   Please followup with your PCP in one week.      General Discharge Instructions:  Please resume all regular home medications unless specifically advised not to take a particular medication. Also, please take any new medications as prescribed.  Please get plenty of rest, continue to ambulate several times per day, and drink adequate amounts of fluids. Avoid lifting weights greater than 5-10 lbs until you follow-up with your surgeon, who will instruct you further regarding activity restrictions.    Warning Signs:  Please call your doctor if you experience the following:  *You experience new chest pain, pressure, shortness of breath, vomiting, or fever greater than 101.5 degrees Fahrenheit or 38 degrees Celsius.  *Any change in your symptoms, or any new symptoms that concerns you.         Ostomy prescription has been provided and can be obtained from the chart by your nurse.    Please follow up with Dr. Zhang in one week; you may call the office to make an appointment at your earliest convenience.  Please follow up with your oncologist in one week.  Please follow up with your urologist within one week.   Please followup with your PCP in one week.      General Discharge Instructions:  Please resume all regular home medications unless specifically advised not to take a particular medication. Also, please take any new medications as prescribed.  Please get plenty of rest, continue to ambulate several times per day, and drink adequate amounts of fluids. Avoid lifting weights greater than 5-10 lbs until you follow-up with your surgeon, who will instruct you further regarding activity restrictions.    Warning Signs:  Please call your doctor if you experience the following:  *You experience new chest pain, pressure, shortness of breath, vomiting, or fever greater than 101.5 degrees Fahrenheit or 38 degrees Celsius.  *Any change in your symptoms, or any new symptoms that concerns you.      Keep well hydrated. Replace fluid loss from ostomy daily. Avoid only drinking plain water. Include Gatorade and/or other vitamin drinks to replace fluid. Try to maintain ostomy output between 500-1000cc If Ostomy output >1 liter, take 2mg of Imodium, repeat 2mg with each episode of loose stool. Do not exceed 16mg/24 hours. Followup with office if ostomy output exceeds 1.5-2L per day within 24hrs.

## 2022-10-21 NOTE — DISCHARGE NOTE PROVIDER - NSDCMRMEDTOKEN_GEN_ALL_CORE_FT
Albuterol (Eqv-ProAir HFA) 90 mcg/inh inhalation aerosol: inhaled prn, As Needed  Imodium 2 mg oral capsule: 1 cap(s) orally 3 times a day   ipratropium-albuterol 0.5 mg-2.5 mg/3 mL inhalation solution: 3 milliliter(s) inhaled every 6 hours, As needed, Shortness of Breath and/or Wheezing  Rolling walker: Rolling Walker    Dx: Deconditioning  simvastatin 40 mg oral tablet: 1 tab(s) orally once a day (at bedtime)   Adapt Barrier Rings 2&quot; #8805: 1 application transdermal once a day   Adapt Ostomy Belt #7300: 1 application transdermal once a day   Adapt Stoma Powder #7906: 1 application transdermal once a day   Albuterol (Eqv-ProAir HFA) 90 mcg/inh inhalation aerosol: inhaled prn, As Needed  Rocael Drainable pouch 2 3/4&quot; #84499: 1 application transdermal once a day   [place] skin Barrier 2 3/4&quot; #89018: 1 application transdermal once a day   Imodium 2 mg oral capsule: 1 cap(s) orally 3 times a day   ipratropium-albuterol 0.5 mg-2.5 mg/3 mL inhalation solution: 3 milliliter(s) inhaled every 6 hours, As needed, Shortness of Breath and/or Wheezing  Rolling walker: Rolling Walker    Dx: Deconditioning  simvastatin 40 mg oral tablet: 1 tab(s) orally once a day (at bedtime)  Skin Prep #7917: 1 application transdermal once a day    Adapt Barrier Rings 2&quot; #8805: 1 application transdermal once a day   Adapt Ostomy Belt #7300: 1 application transdermal once a day   Adapt Stoma Powder #7906: 1 application transdermal once a day   Albuterol (Eqv-ProAir HFA) 90 mcg/inh inhalation aerosol: inhaled prn, As Needed  Rocael Drainable pouch 2 1/4&quot; #66665: 1 application transdermal once a day   Bedias skin Barrier 2 1/4&quot; #72423: 1 application transdermal once a day   Imodium 2 mg oral capsule: 1 cap(s) orally 3 times a day   ipratropium-albuterol 0.5 mg-2.5 mg/3 mL inhalation solution: 3 milliliter(s) inhaled every 6 hours, As needed, Shortness of Breath and/or Wheezing  Rolling walker: Rolling Walker    Dx: Deconditioning  simvastatin 40 mg oral tablet: 1 tab(s) orally once a day (at bedtime)  Skin Prep #7917: 1 application transdermal once a day

## 2022-10-21 NOTE — PROGRESS NOTE ADULT - ASSESSMENT
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 Was discharged and now readmitted 10/18 for high grade SBO found on outpatient imaging with concerns of pelvic collection s/p aspiration by IR 10/19, now pending Cx. ROSA Cr still elevated, would keep.    Recommendations:  - Keep Degroot catheter while inpatient - do not remove even given UCx results  - NPO with NGT per surgery  - ROSA Cr still elevated, keep ROSA drain at this time  - if becomes febrile, would treat UCx and collection Cx.  - discussed with attending  - no acute  interventions at this time    CHAN Paz MD (PGY-2)  Consult Urology Resident  Please feel free to reach out on Teams Chat   67M PMH HTN, HLD, asthma, colon cancer s/p laparoscopic right colectomy (2016), chronic diverticulitis with known colovesicular fistula since July 2022, recently admitted (9/19-26) w/ chronic cystitis and bacteremia treated with IV abx, now s/p elective laparoscopic resection of colovesicular fistula, cystotomy closure, and bilateral ureteral stent placement 10/06/2022. Was discharged and now readmitted 10/18 for high grade SBO found on outpatient imaging with concerns of pelvic collection s/p aspiration by IR 10/19, now pending Cx. ROSA Cr still elevated, would keep.    Recommendations:  - Keep Yepez catheter while inpatient - do not remove even given UCx results  - NPO with NGT per surgery  - ROSA Cr still elevated, keep ROSA drain at this time  - if becomes febrile, would treat UCx and collection Cx.  - discussed with attending  - no acute  interventions at this time  - dc with yepez and ROSA drain  - f/u with Dr. Torre 10/31    CHAN Paz MD (PGY-2)  Consult Urology Resident  Please feel free to reach out on Teams Chat

## 2022-10-21 NOTE — DISCHARGE NOTE PROVIDER - HOSPITAL COURSE
***INCOMPLETE NOTE*** 66 yo M with PMH HTN, HLD, asthma, diverticulitis with colovescicular fistula and colon cancer s/p R hemicolectomy (2016) and now s/p exploratory laparotomy, segmental colon resection, resection of colovesicular fistula, partial cystectomy, bladder diverticulectomy, colorectal anastomosis, rigid sigmoidoscopy, loop ileostomy creation (10/06/22) complicated by a bladder leak (elevated Cr in JACOB) presented with decreased ileostomy output and feeling bloated. Patient went for CT scan where they found an obstruction and he was told to come to the ED. Patient came to the ED on 10/18 and was admitted. Patient had NG tube placed. Patient was planned to undergo IR drainage on 10/19, but ultrasound showed resolved peritoneal fluid collection. On 10/20 patient's urine culture was positive for candida. Patient started on     10/19: unsuccessful IR drainage  10/20: PICC line  10/24: exploratory laparotomy, SBR, partial omentectomy     10/24: . Urine output 50. Gave 500 cc bolus x2  O/N: CT prelim read closed loop obstruction, -N/-V. NGT bilious. abd soft, appr ttp, ostomy w/ min gas/stool. VSS. COVID neg, stat lactate 1.3, coags obtained, added on for ex lap. post op labs stable, hgb 9.6 (11.4), Mg 1.5, repleted. low UOP, 1L bolus x1. POC wnl. Voided 140, BS 47. gave another 1L bolus @ 6am  10/23: Gas/Ostomy output in bag, +V overnight, NPO/IVF @100, KUB - wet: dilated stomach, emesis 500ccs, NGT - minimal return - LIWS, Red rubber out. CTAP w/PO/IV ordered ____. Contrast gven via NGT.   ON: endorsed some abdominal pain, gave PO tylenol, ostomy w air and liquid stool, gas pain  10/22: LRD, decreased fluids to 40/hour, jacob creatinine 2.54  ON: brett diet, -N/-V, OOBA, ostomy w air and liquid stool, JACOB w minimal op  10/21: ID dropped note, no need for abx. NGT removed. brett sips/chips, IVF @ 80. -N/-V. ostomy w/ gas and stool. VSS. d/c'd zosyn. adv to clears. switched to PPI QD. per uro, no need for cystogram, pt will go home w/ yepez/JACOB. VSS,  ON: spoke to  re + urine cx- they said do not replace yepez at this time, and to consult ID. spoke to ID attending on call Dr Edmonds, he said do not treat asymptomatic bacturia, will drop note in the AM  10/20: uro recommended jacob creatinine, PICC line placed. JACOB Cr 22. AXR obtained, showed some air fluid levels. Staples removed. Novosela consulted for eval. Felt better today, more gas/stool in bag, feels hungry, continue NPO/NGT. +urine positive for candida. JACOB x1 minimal OP.   ON: JACOB off wall suction to bulb suction draining serous fluid, ostomy with gas and trace amounts of stool, NGT to LIWS w blood tinged op  10/19: -N/-V. NGT bright bloody OP in tubing, appears dark in canister, started PPI BID. abd soft, nd, ttp to ostomy. Ostomy w/ very little gas and stool. +amb. Per uro, ok to send urine cx, keep yepez, will send JACOB cr after new drain.  resolved pelvic collection. restarted HSQ  ON: NGT adjusted, post pyloric, adjusted, placement confirmed, connected to LIWS 900ccs bilious op, JACOB connected to LCWS, little to no output, pt needs 2nd IV, made multiple attempts with and without US guidance, pt refusing additional attempts at this time, switched mIVF from LR to NS so that IVF can be run with Zosyn  10/18: admitted, placed NGT - placement confirmed - 200c out, called urology,      ***INCOMPLETE NOTE*** 68 yo M with PMH HTN, HLD, asthma, diverticulitis with colovescicular fistula and colon cancer s/p R hemicolectomy (2016) and now s/p exploratory laparotomy, segmental colon resection, resection of colovesicular fistula, partial cystectomy, bladder diverticulectomy, colorectal anastomosis, rigid sigmoidoscopy, loop ileostomy creation (10/06/22) complicated by a bladder leak (elevated Cr in ROSA) presented with decreased ileostomy output and feeling bloated. Patient went for CT scan where they found an obstruction and he was told to come to the ED. Patient came to the ED on 10/18 and was admitted. Patient had NG tube placed. Patient started on Zosyn. Patient was planned to undergo IR drainage on 10/19, but ultrasound showed resolved peritoneal fluid collection. On 10/20 patient's urine culture was positive for candida. ID was consulted and recommended no antibiotics as pt was asymptomatic. On 10/20 PICC line was placed. NGT was removed 10/21. Patient began tolerating diet. Zosyn was discontinued. Patient was abl eto tolerate advances in his diet. On 10/23 patient's stomach was dilated. NGT was placed. He went for CT of the abdomen and pelvis that showed small bowel obstruction. Pt went to OR 10/24 for emergent exploratory laparotomy, small bowel resection, and partial omentectomy.       ***INCOMPLETE NOTE*** last updated 10/25 68 yo M with PMH HTN, HLD, asthma, diverticulitis with colovescicular fistula and colon cancer s/p R hemicolectomy (2016) and now s/p exploratory laparotomy, segmental colon resection, resection of colovesicular fistula, partial cystectomy, bladder diverticulectomy, colorectal anastomosis, rigid sigmoidoscopy, loop ileostomy creation (10/06/22) complicated by a bladder leak (elevated Cr in ROSA) presented with decreased ileostomy output and feeling bloated. Patient went for CT scan where they found an obstruction and he was told to come to the ED. Patient came to the ED on 10/18 and was admitted. Patient had NG tube placed. Patient started on Zosyn. Patient was planned to undergo IR drainage on 10/19, but ultrasound showed resolved peritoneal fluid collection. On 10/20 patient's urine culture was positive for candida. ID was consulted and recommended no antibiotics as pt was asymptomatic. On 10/20 PICC line was placed. NGT was removed 10/21. Patient began tolerating diet. Zosyn was discontinued. Patient was abl eto tolerate advances in his diet. On 10/23 patient's stomach was dilated. NGT was placed. He went for CT of the abdomen and pelvis that showed small bowel obstruction. Pt went to OR 10/24 for emergent exploratory laparotomy, small bowel resection, and partial omentectomy. NG tube was removed and pt was started on TPN on 10/26      ***INCOMPLETE NOTE*** last updated 10/26 68 yo M with PMH HTN, HLD, asthma, diverticulitis with colovescicular fistula and colon cancer s/p R hemicolectomy (2016) and now s/p exploratory laparotomy, segmental colon resection, resection of colovesicular fistula, partial cystectomy, bladder diverticulectomy, colorectal anastomosis, rigid sigmoidoscopy, loop ileostomy creation (10/06/22) complicated by a bladder leak (elevated Cr in ROSA) presented with decreased ileostomy output and feeling bloated. Patient went for CT scan where they found an obstruction and he was told to come to the ED. Patient came to the ED on 10/18 and was admitted. Patient had NG tube placed. Patient started on Zosyn. Patient was planned to undergo IR drainage on 10/19, but ultrasound showed resolved peritoneal fluid collection. On 10/20 patient's urine culture was positive for candida. ID was consulted and recommended no antibiotics as pt was asymptomatic. On 10/20 PICC line was placed. NGT was removed 10/21. Patient began tolerating diet. Zosyn was discontinued. Patient was abl eto tolerate advances in his diet. On 10/23 patient's stomach was dilated. NGT was placed. He went for CT of the abdomen and pelvis that showed small bowel obstruction. Pt went to OR 10/24 for emergent exploratory laparotomy, small bowel resection, and partial omentectomy. NG tube was removed and pt was started on TPN on 10/26. Patient advanced diet and resumed bowel function with gas and stool in ostomy. CT cystogram wnl, yepez discontinued.     ****INCOMPLETE    PROJECTED 11/1:    Patient passed trial of void. ROSA removed. PICC removed. At time of discharge, patient was stable and ready for discharge.   66 yo M with PMH HTN, HLD, asthma, diverticulitis with colovescicular fistula and colon cancer s/p R hemicolectomy (2016) and now s/p exploratory laparotomy, segmental colon resection, resection of colovesicular fistula, partial cystectomy, bladder diverticulectomy, colorectal anastomosis, rigid sigmoidoscopy, loop ileostomy creation (10/06/22) complicated by a bladder leak (elevated Cr in ROSA) presented with decreased ileostomy output and feeling bloated. Patient went for CT scan where they found an obstruction and he was told to come to the ED. Patient came to the ED on 10/18 and was admitted. Patient had NG tube placed. Patient started on Zosyn. Patient was planned to undergo IR drainage on 10/19, but ultrasound showed resolved peritoneal fluid collection. On 10/20 patient's urine culture was positive for candida. ID was consulted and recommended no antibiotics as pt was asymptomatic. On 10/20 PICC line was placed. NGT was removed 10/21. Patient began tolerating diet. Zosyn was discontinued. Patient was abl eto tolerate advances in his diet. On 10/23 patient's stomach was dilated. NGT was placed. He went for CT of the abdomen and pelvis that showed small bowel obstruction. Pt went to OR 10/24 for emergent exploratory laparotomy, small bowel resection, and partial omentectomy. NG tube was removed and pt was started on TPN on 10/26. Patient advanced diet and resumed bowel function with gas and stool in ostomy. CT cystogram wnl, yepez discontinued. PICC Removed.    ****INCOMPLETE    PROJECTED 11/1:    Patient passed trial of void. ROSA removed. At time of discharge, patient was stable and ready for discharge.   68 yo M with PMH HTN, HLD, asthma, diverticulitis with colovescicular fistula and colon cancer s/p R hemicolectomy (2016) and now s/p exploratory laparotomy, segmental colon resection, resection of colovesicular fistula, partial cystectomy, bladder diverticulectomy, colorectal anastomosis, rigid sigmoidoscopy, loop ileostomy creation (10/06/22) complicated by a bladder leak (elevated Cr in ROSA) presented with decreased ileostomy output and feeling bloated. Patient went for CT scan where they found an obstruction and he was told to come to the ED. Patient came to the ED on 10/18 and was admitted. Patient had NG tube placed. Patient started on Zosyn. Patient was planned to undergo IR drainage on 10/19, but ultrasound showed resolved peritoneal fluid collection. On 10/20 patient's urine culture was positive for candida. ID was consulted and recommended no antibiotics as pt was asymptomatic. On 10/20 PICC line was placed. NGT was removed 10/21. Patient began tolerating diet. Zosyn was discontinued. Patient was able to tolerate advance. On 10/23 patient's stomach was dilated. NGT was placed. He went for CT of the abdomen and pelvis that showed small bowel obstruction. Pt returned to the OR 10/24 for emergent exploratory laparotomy, small bowel resection, and partial omentectomy. NG tube was removed and pt was started on TPN on 10/26-10/31. Patient tolerated advances in his diet without any nausea or vomiting, able to ambulate, and resumed bowel function with gas and stool in ostomy. Urology recommended an CT cystogram (10/31), results wnl, patient passed trail of void (11/1), removal of PICC (11/1), and ROSA removed. At the time of discharge, patient was a febrile and hemodynamically stable for home with [facility] services for PT and ostomy care. Patient will followup within 1 week with Dr. Zhang, primary care provider, Dr. Torre, and Oncology.

## 2022-10-21 NOTE — CONSULT NOTE ADULT - SUBJECTIVE AND OBJECTIVE BOX
Vascular Access Service Consult Note    [facility]             Diagnosis: malnutrition     Indications for Vascular Access (Check all that apply)  [  ]  Antibiotic Therapy       Antibiotic Prescribed:                                                                             Expected Duration of Therapy:               [  ]  IV Hydration  [ X ]  Total Parenteral Nutrition  [  ]  Chemotherapy  [  ]  Difficult Venous Access  [  ]  CVP monitoring  [  ]  Medications with high potential for tissue necrosis on extravasation  [  ]  Other    Screening (Check all that apply)  Previous Radiation to chest  [  ] Yes      [X ]  No  Breast Cancer                          [  ] Left     [  ]  Right    [ X ]  No  Lymph Node Dissection         [  ] Left     [  ]  Right    [ X ]  No  Pacemaker or ICD                   [  ] Left     [  ]  Right    [  X]  No  Upper Extremity DVT             [  ] Left     [  ]  Right    [  X]  No  Chronic Kidney Disease         [  ]  Yes     [ X ]  No  Hemodialysis                           [  ]  Yes     [X  ]  No  AV Fistula/ Graft                     [  ]  Left    [  ]  Right    [X  ]  No  Temp>101F in past 24 H       [  ]  Yes     [  X]  No  H/O PICC/Midline                   [  ]  Yes     [X  ]  No    Lab data:                        9.8    11.92 )-----------( 512      ( 20 Oct 2022 07:29 )             31.6     10-20    137  |  95<L>  |  21  ----------------------------<  108<H>  4.0   |  33<H>  |  0.87    Ca    10.2      20 Oct 2022 07:29  Phos  4.3     10-20  Mg     2.2     10-20    TPro  5.9<L>  /  Alb  3.7  /  TBili  0.9  /  DBili  x   /  AST  58<H>  /  ALT  74<H>  /  AlkPhos  115  10-20    PT/INR - ( 19 Oct 2022 05:45 )   PT: 11.8 sec;   INR: 0.99          PTT - ( 19 Oct 2022 05:45 )  PTT:25.5 sec          I have reviewed the chart, interviewed and examined the patient and determined that this patient:  [ X ] Is a candidate for a PICC line  [  ] Is a candidate for a Midline  [  ] Is not a candidate for vascular access device (reason)    Lumens:    [  ] Single  [X  ] Double
66 yo M with PMH HTN, HLD, asthma, diverticulitis with colovesicular fistula and colon cancer s/p R hemicolectomy (2016) and now s/p exploratory laparotomy, segmental colon resection, resection of colovesicular fistula, partial cystectomy, bladder diverticulectomy, colorectal anastomosis, rigid sigmoidoscopy, loop ileostomy creation (10/06/22) complicated by a bladder leak (elevated Cr in ROSA) presenting with decreased ileostomy output and feeling bloated. Patient was sent to the ED after findings of obstruction on CT scan. CT scan with SBO and complex fluid collection within the anterior pelvis measuring 10 by 5.6 cm. Patient admitted for further management. IR consulted for pelvic collection drainage.    Clinical History: BOWEL OBSTRUCTION    FH: prostate cancer (Father)    FH: bladder cancer (Father)    FH: liver cancer (Father)    FH: kidney cancer (Father)    FH: breast cancer (Mother, Sibling)    FH: multiple myeloma (Sibling)    Handoff    MEWS Score    Malignant neoplasm of hepatic flexure    Intestinal obstruction    Hypertension    High cholesterol    History of asthma    Vesicointestinal fistula    History of diverticulitis    Cystitis    H/O ascites    Bowel obstruction    H/O left inguinal hernia repair    S/P right hemicolectomy    S/P exploratory laparotomy    Enterovesical fistula    S/P colon resection    S/P ureteral stent placement    BOWEL    4    SysAdmin_VisitLink        Meds:dextrose 5% + sodium chloride 0.45%. 1000 milliLiter(s) IV Continuous <Continuous>  ondansetron Injectable 4 milliGRAM(s) IV Push every 6 hours PRN  piperacillin/tazobactam IVPB.. 3.375 Gram(s) IV Intermittent every 8 hours      Allergies:No Known Allergies        Labs:                           10.1   11.01 )-----------( 566      ( 19 Oct 2022 05:45 )             31.9     PT/INR - ( 19 Oct 2022 05:45 )   PT: 11.8 sec;   INR: 0.99          PTT - ( 19 Oct 2022 05:45 )  PTT:25.5 sec  10-19    136  |  94<L>  |  22  ----------------------------<  106<H>  4.1   |  31  |  0.73    Ca    10.0      19 Oct 2022 05:45  Phos  4.9     10-19  Mg     2.0     10-19    TPro  5.9<L>  /  Alb  3.7  /  TBili  0.5  /  DBili  x   /  AST  35  /  ALT  52<H>  /  AlkPhos  115  10-19          Imaging Findings: There is a complex fluid collection within the anterior pelvis measuring 10 by 5.6 cm. This is seen superior and anterior to the bladder.  
HPI:  68 yo M with PMH HTN, HLD, asthma, diverticulitis with colovescicular fistula and colon cancer s/p R hemicolectomy (2016) and now s/p exploratory laparotomy, segmental colon resection, resection of colovesicular fistula, partial cystectomy, bladder diverticulectomy, colorectal anastomosis, rigid sigmoidoscopy, loop ileostomy creation (10/06/22) complicated by a bladder leak (elevated Cr in ROSA) presenting with decreased ileostomy output and feeling bloated. Patient had called the office and was told to get a CT scan done prior to his visit with Dr. Kern tomorrow. He was told by radiology that he had an obstruction and to come to the ED. Reports abdominal pain around the ostomy. Reports that he has had decreased output from his ostomy in the past day. Usually he changes the bag 3-4 times a day but he geovanny changed it once since yesterday. Patient denies nausa/vomiting, denies fevers chills.     Colonoscopy (09/22)- biopsies were taken and showed sigmoid adenocarcinoma  Endoscopy - never    ED: afeb, VSS, WBC 20. CT scan with SBO and complex fluid collection within the  anterior pelvis measuring 10 by 5.6 cm        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, work- pharmacist  Meds: simvastatin, losartan, albuterol, lamotil       (18 Oct 2022 22:07)       ADDENDUM:  As above, patient reported afebrile since discharge. His ROSA drain output has decreased over last 2 days with no output. Yepez output has increased. Patient is tolerating yepez. Denies fever/chills, headache. Minimal incisional ab pain around suprapubic region.       PAST MEDICAL & SURGICAL HISTORY:  Malignant neoplasm of hepatic flexure      Intestinal obstruction      Hypertension      High cholesterol      History of asthma      Vesicointestinal fistula      History of diverticulitis      Cystitis      H/O ascites      H/O left inguinal hernia repair      S/P right hemicolectomy  colon cancer      S/P exploratory laparotomy      Enterovesical fistula  closure with cystotomy      S/P colon resection      S/P ureteral stent placement          MEDICATIONS  (STANDING):  dextrose 5% + sodium chloride 0.45%. 1000 milliLiter(s) (115 mL/Hr) IV Continuous <Continuous>  piperacillin/tazobactam IVPB.. 3.375 Gram(s) IV Intermittent every 8 hours    MEDICATIONS  (PRN):  ondansetron Injectable 4 milliGRAM(s) IV Push every 6 hours PRN Nausea      Allergies    No Known Allergies    Intolerances        SOCIAL HISTORY:    FAMILY HISTORY:  FH: prostate cancer (Father)    FH: bladder cancer (Father)    FH: liver cancer (Father)    FH: kidney cancer (Father)    FH: breast cancer (Mother, Sibling)    FH: multiple myeloma (Sibling)        Vital Signs Last 24 Hrs  T(C): 36.7 (19 Oct 2022 08:40), Max: 37.1 (19 Oct 2022 04:53)  T(F): 98 (19 Oct 2022 08:40), Max: 98.7 (19 Oct 2022 04:53)  HR: 78 (19 Oct 2022 08:40) (73 - 91)  BP: 111/71 (19 Oct 2022 08:40) (111/71 - 155/84)  BP(mean): --  RR: 16 (19 Oct 2022 08:40) (16 - 18)  SpO2: 97% (19 Oct 2022 08:40) (94% - 99%)    Parameters below as of 19 Oct 2022 08:40  Patient On (Oxygen Delivery Method): room air        PHYSICAL EXAM  General: NAD, resting comfortably in bed, NGT in place draining bilious contents  C/V: NSR  Pulm: Nonlabored breathing, no respiratory distress on room air  Abd: soft, ND, appropriate incisional TTP, mild deep TTP suprapubic region  : yepez draining clear yellow urine. ROSA with yellow fluid, no output to wall suction  Extrem: WWP, no edema, SCDs in place      LABS:                        10.1   11.01 )-----------( 566      ( 19 Oct 2022 05:45 )             31.9     10-19    136  |  94<L>  |  22  ----------------------------<  106<H>  4.1   |  31  |  0.73    Ca    10.0      19 Oct 2022 05:45  Phos  4.9     10-19  Mg     2.0     10-19    TPro  5.9<L>  /  Alb  3.7  /  TBili  0.5  /  DBili  x   /  AST  35  /  ALT  52<H>  /  AlkPhos  115  10-19    PT/INR - ( 19 Oct 2022 05:45 )   PT: 11.8 sec;   INR: 0.99          PTT - ( 19 Oct 2022 05:45 )  PTT:25.5 sec  Urinalysis Basic - ( 18 Oct 2022 22:41 )    Color: Yellow / Appearance: Cloudy / SG: >=1.030 / pH: x  Gluc: x / Ketone: NEGATIVE  / Bili: Negative / Urobili: 0.2 E.U./dL   Blood: x / Protein: 100 mg/dL / Nitrite: NEGATIVE   Leuk Esterase: Moderate / RBC: Many /HPF / WBC Many /HPF   Sq Epi: x / Non Sq Epi: 0-5 /HPF / Bacteria: Present /HPF        RADIOLOGY & ADDITIONAL STUDIES:  < from: CT Abdomen and Pelvis Urogram w/wo IV Cont (10.18.22 @ 19:50) >    ******PRELIMINARY REPORT******      ******PRELIMINARY REPORT******       ACC: [id number] EXAM:  CT ABD PELV UROGRAM [facility]                          PROCEDURE DATE:  10/18/2022    ******PRELIMINARY REPORT******      ******PRELIMINARY REPORT******           INTERPRETATION:  [facility] RADIOLOGIST PRELIMINARY REPORT      Initial report created on 10/18/2022 8:43:10 PM EDT  PROCEDURE INFORMATION:  Exam: CT Abdomen And Pelvis Without And With Contrast  Exam date and time: 10/18/2022 7:25 PM  Age: 67 years old  Clinical indication: Colovesical fistula;    TECHNIQUE:  Imaging protocol: Computed tomography of the abdomen and pelvis without   and  with contrast.    COMPARISON:  CT ABDOMEN AND PELVIS WITH ORAL CONTRAST WITH IV CONTRAST 9/19/2022 8:49   PM    FINDINGS:  Tubes, catheters and devices: Interval placement of bilateral double-J   stents.  Interval placement of a surgical drain via a left ventral abdominal   approach.  The drain is seen in the low pelvis. There is a ventral midline staple   line.  Interval placement of a Yepez catheter.    Lungs: The visualized lung bases are clear.    Liver: Indeterminate 1.4 cm hypoattenuating hepatic lesion, stable from   prior.  There is a diffuse decrease in hepatic parenchymal density, consistent   with  fatty infiltration.  Gallbladder and bile ducts: Normal. No calcified stones. No ductal   dilation.  Pancreas: Normal. No ductal dilation.  Spleen: Normal. No splenomegaly.  Adrenal glands: Normal. No mass.  Kidneys and ureters: There is air within both renal pelves. Interval   placement  of bilateral double-J stents. The previously described hydronephrosis has  resolved.  Stomach and bowel: Evaluation of the gut is limited without oral contrast.  There is marked distention of the stomach. Thestomach is distended with a  large amount of fluid. There is a large air-fluid level. There is   prominent  distention of the jejunum in the left upper quadrant.  Portions of the   jejunum  are severely distended with fluid measuring over 5 cm in diameter.  There   are  air-fluid levels.  The transition zone is thought to be within the left   lower  abdomen where dilated, fluid-filled loops of small intestine are seen   adjacent  to small caliber small bowel loops. The ileum is very small in caliber and  decompressed. The patient has had interval colectomy and diverting   colostomy in  the right ventral abdomen. Anastomosis is seen in the right mid abdomen.  Interval placement of an of a ventral ostomy.  Appendix:  Not visualized.    Intraperitoneal space: There is a small amount of intraperitoneal free   air.  This is likely due to recent surgery and indwelling drainage catheters.   There  is a small amount of complex free fluid within the abdomen. There is a   small  fluid collection inthe subphrenic space. There is a small amount of   complex  fluid surrounding the liver. There is a complex fluid collection within   the  anterior pelvis measuring 10 by 5.6 cm. This was not present on the   preceding  study. This is seen superior and anterior to the bladder.  Vasculature: There is mild calcification of the aorta and the aortic   branches.  Lymph nodes:  Shotty mesenteric, and retroperitoneal adenopathy, likely  reactive.  Urinary bladder: There continues to be air within the urinary bladder. The  previously seen bladder stones have been removed. There are bilateral   bladder  diverticula containing air and a stone on the right. There continues to be  severe bladder wall thickening.  The bladder diverticula are also   irregular  with wall thickening.  Reproductive: Incompletely visualized large right scrotal hydrocele.  Bones/joints: There are degenerative disc changes and hypertrophic   changes in  the spine without acute osseous abnormality.  Soft tissues: Cachexia anddiffuse anasarca. Fat containing right inguinal  hernia. Interval operative changes involving the ventral abdominal wall.    IMPRESSION:  1. Interval subtotal colectomy and placement of a ventral colostomy.  2. Findings consistent with high-grade proximal small-bowel obstruction.  Surgical intervention is warranted.  The stomach is overly distended with  fluid.  This patient may be at risk for aspiration.  NG tube insertion is  recommended.  3. There is a complex fluid collection within the anterior pelvis   measuring 10  cm. This is suspicious for developing abscess.  4. There is a small amount of intraperitoneal free air. This is likely   due to  recent surgery and indwelling drainage catheters.  5. There is a small amount of complex ascites within the abdomen pelvis.  6. Interval placement of a Yepez catheter and bilateral double-J stents.  7. There is persistent severe bladder wall thickening, large bilateral and  thickened bladder diverticula, and air within the urinary bladder. There   is a  large stone within the right bladder diverticula.        ******PRELIMINARY REPORT******      ******PRELIMINARY REPORT******         DANG FOWLER M.D.;[facility] RADIOLOGIST  This document is a PRELIMINARY interpretation and is pending final   attending approval. Oct 18 2022  8:43PM    < end of copied text >  
HPI:  68 yo M with PMH HTN, HLD, asthma, diverticulitis with colovescicular fistula and colon cancer s/p R hemicolectomy (2016) and now s/p exploratory laparotomy, segmental colon resection, resection of colovesicular fistula, partial cystectomy, bladder diverticulectomy, colorectal anastomosis, rigid sigmoidoscopy, loop ileostomy creation (10/06/22) complicated by a bladder leak (elevated Cr in ROSA) presenting with decreased ileostomy output and feeling bloated. Patient had called the office and was told to get a CT scan done prior to his visit with Dr. Kern tomorrow. He was told by radiology that he had an obstruction and to come to the ED. Reports abdominal pain around the ostomy. Reports that he has had decreased output from his ostomy in the past day. Usually he changes the bag 3-4 times a day but he geovanny changed it once since yesterday. Patient denies nausa/vomiting, denies fevers chills.     He denies any dysuria, back pain, fevers or chills.       (18 Oct 2022 22:07)      PAST MEDICAL & SURGICAL HISTORY:  Malignant neoplasm of hepatic flexure      Intestinal obstruction      Hypertension      High cholesterol      History of asthma      Vesicointestinal fistula      History of diverticulitis      Cystitis      H/O ascites      H/O left inguinal hernia repair      S/P right hemicolectomy  colon cancer      S/P exploratory laparotomy      Enterovesical fistula  closure with cystotomy      S/P colon resection      S/P ureteral stent placement            REVIEW OF SYSTEMS:    General:	 no weakness; no fevers, no chills  Skin/Breast: no rash  Respiratory and Thorax: no SOB, no cough  Cardiovascular:	No chest pain  Gastrointestinal:	 no nausea, vomiting , diarrhea  Genitourinary:	no dysuria, no difficulty urinating, no hematuria  Musculoskeletal:	no weakness, no joint swelling/pain  Neurological:	no focal weakness/numbness  Endocrine:	no polyuria, no polydipsia      ANTIBIOTICS:  MEDICATIONS  (STANDING):  chlorhexidine 2% Cloths 1 Application(s) Topical <User Schedule>  dextrose 5% + sodium chloride 0.45%. 1000 milliLiter(s) (80 mL/Hr) IV Continuous <Continuous>  heparin   Injectable 5000 Unit(s) SubCutaneous every 8 hours  influenza  Vaccine (HIGH DOSE) 0.7 milliLiter(s) IntraMuscular once  pantoprazole  Injectable 40 milliGRAM(s) IV Push every 12 hours    MEDICATIONS  (PRN):  ondansetron Injectable 4 milliGRAM(s) IV Push every 6 hours PRN Nausea  sodium chloride 0.9% lock flush 10 milliLiter(s) IV Push every 1 hour PRN Pre/post blood products, medications, blood draw, and to maintain line patency      Allergies    No Known Allergies    Intolerances        SOCIAL HISTORY:    FAMILY HISTORY:  FH: prostate cancer (Father)    FH: bladder cancer (Father)    FH: liver cancer (Father)    FH: kidney cancer (Father)    FH: breast cancer (Mother, Sibling)    FH: multiple myeloma (Sibling)        Vital Signs Last 24 Hrs  T(C): 36.8 (21 Oct 2022 16:12), Max: 36.8 (21 Oct 2022 00:07)  T(F): 98.2 (21 Oct 2022 16:12), Max: 98.2 (21 Oct 2022 00:07)  HR: 63 (21 Oct 2022 16:12) (63 - 84)  BP: 126/69 (21 Oct 2022 16:12) (108/65 - 126/69)  BP(mean): --  RR: 17 (21 Oct 2022 16:12) (17 - 18)  SpO2: 98% (21 Oct 2022 16:12) (95% - 98%)    Parameters below as of 21 Oct 2022 16:12  Patient On (Oxygen Delivery Method): room air        PHYSICAL EXAM:  Constitutional:  non-toxic, no distress  Eye:    Ear/Nose/Throat: no oral lesion, no sinus tenderness on percussion	  Neck:no JVD, no lymphadenopathy, supple  Respiratory: CTA eliu  Cardiovascular: S1S2 RRR, no murmurs  Gastrointestinal:soft, (+) BS, no HSM  Extremities:no e/e/c  :  + indwelling yepez   Vascular: DP Pulse:	right normal; left normal            LABS:                        9.2    8.03  )-----------( 435      ( 21 Oct 2022 07:27 )             29.3     10-21    137  |  96  |  17  ----------------------------<  108<H>  3.8   |  33<H>  |  0.73    Ca    10.0      21 Oct 2022 07:27  Phos  3.0     10-21  Mg     2.1     10-21    TPro  5.7<L>  /  Alb  3.2<L>  /  TBili  0.5  /  DBili  x   /  AST  59<H>  /  ALT  88<H>  /  AlkPhos  105  10-21          MICROBIOLOGY:    Culture - Urine (10.19.22 @ 10:59)    Specimen Source: Catheterized Catheterized    Culture Results:   >100,000 CFU/ml Candida kefyr Also known as Kluyveromyces marxianus  >100,000 CFU/ml Clavibacter species ( lusitaniae)      RADIOLOGY & ADDITIONAL STUDIES:

## 2022-10-21 NOTE — PROGRESS NOTE ADULT - SUBJECTIVE AND OBJECTIVE BOX
ID appreciated  AVSS  NL wbc  abd soft stoma with function  labs OK  Sips and chips  D/C antibiotics

## 2022-10-21 NOTE — CONSULT NOTE ADULT - ASSESSMENT
IMPRESSION:  It's unclear why the urine culture was checked.  He does not have any evidence of systemic infection.  His yepez catheter is not sterile.  One should presume he is colonized with bacteria and candida.  I would only recommend checking urine cultures in asymptomatic patients if they are pregnant or going for a urological procedure    Recommend:  1.  Can hold antifungal treatment  2.  Treatment for candiduria in the absence of bacteremia or systemic signs is to change the yepez catheter bag    ID team 1 will sign off.

## 2022-10-21 NOTE — DISCHARGE NOTE PROVIDER - CARE PROVIDER_API CALL
Moreno Zhang  COLON/RECTAL SURGERY  [address], [address]  [place], [place] [facility]  Phone: [phone]  Fax: [phone]  Follow Up Time:    Moreno Zhang  COLON/RECTAL SURGERY  [address], [address]  [place] [address]  Phone: [phone]  Fax: [phone]  Follow Up Time:     Umesh Torre)  Urology  [address], [place] [address]  Phone: [phone]  Fax: [phone]  Follow Up Time:

## 2022-10-22 ENCOUNTER — RESULT REVIEW (OUTPATIENT)
Age: 67
End: 2022-10-22

## 2022-10-22 LAB
ANION GAP SERPL CALC-SCNC: 9 MMOL/L — SIGNIFICANT CHANGE UP (ref 5–17)
BUN SERPL-MCNC: 13 MG/DL — SIGNIFICANT CHANGE UP (ref 7–23)
CALCIUM SERPL-MCNC: 9.8 MG/DL — SIGNIFICANT CHANGE UP (ref 8.4–10.5)
CHLORIDE SERPL-SCNC: 97 MMOL/L — SIGNIFICANT CHANGE UP (ref 96–108)
CO2 SERPL-SCNC: 30 MMOL/L — SIGNIFICANT CHANGE UP (ref 22–31)
CREAT FLD-MCNC: 2.54 MG/DL — SIGNIFICANT CHANGE UP
CREAT SERPL-MCNC: 0.55 MG/DL — SIGNIFICANT CHANGE UP (ref 0.5–1.3)
EGFR: 108 ML/MIN/1.73M2 — SIGNIFICANT CHANGE UP
GLUCOSE SERPL-MCNC: 113 MG/DL — HIGH (ref 70–99)
HCT VFR BLD CALC: 30.8 % — LOW (ref 39–50)
HGB BLD-MCNC: 9.6 G/DL — LOW (ref 13–17)
MAGNESIUM SERPL-MCNC: 1.9 MG/DL — SIGNIFICANT CHANGE UP (ref 1.6–2.6)
MCHC RBC-ENTMCNC: 25.1 PG — LOW (ref 27–34)
MCHC RBC-ENTMCNC: 31.2 GM/DL — LOW (ref 32–36)
MCV RBC AUTO: 80.6 FL — SIGNIFICANT CHANGE UP (ref 80–100)
NRBC # BLD: 0 /100 WBCS — SIGNIFICANT CHANGE UP (ref 0–0)
PHOSPHATE SERPL-MCNC: 2.8 MG/DL — SIGNIFICANT CHANGE UP (ref 2.5–4.5)
PLATELET # BLD AUTO: 422 K/UL — HIGH (ref 150–400)
POTASSIUM SERPL-MCNC: 3.8 MMOL/L — SIGNIFICANT CHANGE UP (ref 3.5–5.3)
POTASSIUM SERPL-SCNC: 3.8 MMOL/L — SIGNIFICANT CHANGE UP (ref 3.5–5.3)
RBC # BLD: 3.82 M/UL — LOW (ref 4.2–5.8)
RBC # FLD: 19.1 % — HIGH (ref 10.3–14.5)
SODIUM SERPL-SCNC: 136 MMOL/L — SIGNIFICANT CHANGE UP (ref 135–145)
WBC # BLD: 7.11 K/UL — SIGNIFICANT CHANGE UP (ref 3.8–10.5)
WBC # FLD AUTO: 7.11 K/UL — SIGNIFICANT CHANGE UP (ref 3.8–10.5)

## 2022-10-22 PROCEDURE — 99231 SBSQ HOSP IP/OBS SF/LOW 25: CPT

## 2022-10-22 RX ORDER — SODIUM CHLORIDE 9 MG/ML
1000 INJECTION, SOLUTION INTRAVENOUS
Refills: 0 | Status: DISCONTINUED | OUTPATIENT
Start: 2022-10-22 | End: 2022-10-22

## 2022-10-22 RX ORDER — POTASSIUM CHLORIDE 20 MEQ
10 PACKET (EA) ORAL
Refills: 0 | Status: COMPLETED | OUTPATIENT
Start: 2022-10-22 | End: 2022-10-22

## 2022-10-22 RX ORDER — ACETAMINOPHEN 500 MG
650 TABLET ORAL EVERY 6 HOURS
Refills: 0 | Status: DISCONTINUED | OUTPATIENT
Start: 2022-10-22 | End: 2022-10-31

## 2022-10-22 RX ADMIN — SODIUM CHLORIDE 80 MILLILITER(S): 9 INJECTION, SOLUTION INTRAVENOUS at 06:04

## 2022-10-22 RX ADMIN — HEPARIN SODIUM 5000 UNIT(S): 5000 INJECTION INTRAVENOUS; SUBCUTANEOUS at 06:03

## 2022-10-22 RX ADMIN — PANTOPRAZOLE SODIUM 40 MILLIGRAM(S): 20 TABLET, DELAYED RELEASE ORAL at 06:04

## 2022-10-22 RX ADMIN — CHLORHEXIDINE GLUCONATE 1 APPLICATION(S): 213 SOLUTION TOPICAL at 06:03

## 2022-10-22 RX ADMIN — Medication 650 MILLIGRAM(S): at 23:04

## 2022-10-22 RX ADMIN — Medication 100 MILLIEQUIVALENT(S): at 13:51

## 2022-10-22 RX ADMIN — HEPARIN SODIUM 5000 UNIT(S): 5000 INJECTION INTRAVENOUS; SUBCUTANEOUS at 21:59

## 2022-10-22 RX ADMIN — Medication 100 MILLIEQUIVALENT(S): at 16:21

## 2022-10-22 RX ADMIN — HEPARIN SODIUM 5000 UNIT(S): 5000 INJECTION INTRAVENOUS; SUBCUTANEOUS at 13:52

## 2022-10-22 NOTE — PROGRESS NOTE ADULT - SUBJECTIVE AND OBJECTIVE BOX
GENERAL SURGERY PROGRESS NOTE    SUBJECTIVE: Patient seen and examined bedside.    heparin   Injectable 5000 Unit(s) SubCutaneous every 8 hours      Vital Signs Last 24 Hrs  T(C): 36.3 (22 Oct 2022 09:29), Max: 36.8 (21 Oct 2022 16:12)  T(F): 97.3 (22 Oct 2022 09:29), Max: 98.2 (21 Oct 2022 16:12)  HR: 77 (22 Oct 2022 09:29) (63 - 77)  BP: 126/74 (22 Oct 2022 09:29) (108/65 - 126/74)  BP(mean): 88 (22 Oct 2022 05:41) (88 - 89)  RR: 17 (22 Oct 2022 09:29) (17 - 18)  SpO2: 100% (22 Oct 2022 09:29) (97% - 100%)    Parameters below as of 22 Oct 2022 09:29  Patient On (Oxygen Delivery Method): room air      I&O's Detail    21 Oct 2022 07:01  -  22 Oct 2022 07:00  --------------------------------------------------------  IN:    dextrose 5% + sodium chloride 0.45%: 2000 mL    Oral Fluid: 120 mL  Total IN: 2120 mL    OUT:    Bulb (mL): 15 mL    Ileostomy (mL): 60 mL    Indwelling Catheter - Urethral (mL): 1800 mL  Total OUT: 1875 mL    Total NET: 245 mL      22 Oct 2022 07:01  -  22 Oct 2022 11:31  --------------------------------------------------------  IN:    Oral Fluid: 300 mL  Total IN: 300 mL    OUT:  Total OUT: 0 mL    Total NET: 300 mL          PHYSICAL EXAM    General: NAD, resting comfortably in bed  C/V: NSR  Pulm: Nonlabored breathing, no respiratory distress on room air  Abd: soft, ND, appropriate incisional tenderness, no rebound tenderness, no guarding  Extrem: WWP, no edema, SCDs in place        LABS:                        9.6    7.11  )-----------( 422      ( 22 Oct 2022 07:00 )             30.8     10-22    136  |  97  |  13  ----------------------------<  113<H>  3.8   |  30  |  0.55    Ca    9.8      22 Oct 2022 07:00  Phos  2.8     10-22  Mg     1.9     10-22    TPro  5.7<L>  /  Alb  3.2<L>  /  TBili  0.5  /  DBili  x   /  AST  59<H>  /  ALT  88<H>  /  AlkPhos  105  10-21          RADIOLOGY & ADDITIONAL STUDIES:   GENERAL SURGERY PROGRESS NOTE    SUBJECTIVE: Patient seen and examined bedside with chief. Pain is controlled. Endorses gas and stool in ostomy. Denies n/v, sob, cp.     heparin   Injectable 5000 Unit(s) SubCutaneous every 8 hours      Vital Signs Last 24 Hrs  T(C): 36.3 (22 Oct 2022 09:29), Max: 36.8 (21 Oct 2022 16:12)  T(F): 97.3 (22 Oct 2022 09:29), Max: 98.2 (21 Oct 2022 16:12)  HR: 77 (22 Oct 2022 09:29) (63 - 77)  BP: 126/74 (22 Oct 2022 09:29) (108/65 - 126/74)  BP(mean): 88 (22 Oct 2022 05:41) (88 - 89)  RR: 17 (22 Oct 2022 09:29) (17 - 18)  SpO2: 100% (22 Oct 2022 09:29) (97% - 100%)    Parameters below as of 22 Oct 2022 09:29  Patient On (Oxygen Delivery Method): room air      I&O's Detail    21 Oct 2022 07:01  -  22 Oct 2022 07:00  --------------------------------------------------------  IN:    dextrose 5% + sodium chloride 0.45%: 2000 mL    Oral Fluid: 120 mL  Total IN: 2120 mL    OUT:    Bulb (mL): 15 mL    Ileostomy (mL): 60 mL    Indwelling Catheter - Urethral (mL): 1800 mL  Total OUT: 1875 mL    Total NET: 245 mL      22 Oct 2022 07:01  -  22 Oct 2022 11:31  --------------------------------------------------------  IN:    Oral Fluid: 300 mL  Total IN: 300 mL    OUT:  Total OUT: 0 mL    Total NET: 300 mL          PHYSICAL EXAM    Physical Exam:  General Appearance: AAOx3, NAD, NGT in place   Pulmonary: Nonlabored breathing, no respiratory distress  Cardiovascular: NSR  Abdomen: Soft, mildly distended, appropriate incisional tenderness, ostomy with gas and liquid stool  Extremities: WWP, SCD's in place, no edema       LABS:                        9.6    7.11  )-----------( 422      ( 22 Oct 2022 07:00 )             30.8     10-22    136  |  97  |  13  ----------------------------<  113<H>  3.8   |  30  |  0.55    Ca    9.8      22 Oct 2022 07:00  Phos  2.8     10-22  Mg     1.9     10-22    TPro  5.7<L>  /  Alb  3.2<L>  /  TBili  0.5  /  DBili  x   /  AST  59<H>  /  ALT  88<H>  /  AlkPhos  105  10-21    ROSA creatinine 2.54           RADIOLOGY & ADDITIONAL STUDIES:

## 2022-10-22 NOTE — PROGRESS NOTE ADULT - ASSESSMENT
66 yo M with PMH HTN, HLD, asthma, diverticulitis with colovescicular fistula and colon cancer s/p R hemicolectomy (2016) and now s/p exploratory laparotomy, segmental colon resection, resection of colovesicular fistula, partial cystectomy, bladder diverticulectomy, colorectal anastomosis, rigid sigmoidoscopy, loop ileostomy creation (10/06/22) complicated by a bladder leak (elevated Cr in ROSA) presenting with decreased ileostomy output and feeling bloated. Sent into the ED after findings of obstruction on CT scan. In th ED afeb, VSS, WBC 20. CT scan with SBO and complex fluid collection within the anterior pelvis measuring 10 by 5.6 cm. Admitted for further management.    LRD/IVF  PICC  Pain/Nausea control PRN   SCDs/HSQ  IS/OOBA  JPx1  F/u urology recs   F/u ID recs

## 2022-10-22 NOTE — PROGRESS NOTE ADULT - SUBJECTIVE AND OBJECTIVE BOX
UROLOGY PROGRESS NOTE    SUBJECTIVE: Patient seen and examined bedside. Patient tolerating CLD, with gas and stool in ostomy. Tolerating yepez, feeling much better today.    heparin   Injectable 5000 Unit(s) SubCutaneous every 8 hours      Vital Signs Last 24 Hrs  T(C): 36.3 (22 Oct 2022 09:29), Max: 36.8 (21 Oct 2022 16:12)  T(F): 97.3 (22 Oct 2022 09:29), Max: 98.2 (21 Oct 2022 16:12)  HR: 77 (22 Oct 2022 09:29) (63 - 77)  BP: 126/74 (22 Oct 2022 09:29) (108/65 - 126/74)  BP(mean): 88 (22 Oct 2022 05:41) (88 - 89)  RR: 17 (22 Oct 2022 09:29) (17 - 18)  SpO2: 100% (22 Oct 2022 09:29) (97% - 100%)    Parameters below as of 22 Oct 2022 09:29  Patient On (Oxygen Delivery Method): room air      I&O's Detail    21 Oct 2022 07:01  -  22 Oct 2022 07:00  --------------------------------------------------------  IN:    dextrose 5% + sodium chloride 0.45%: 2000 mL    Oral Fluid: 120 mL  Total IN: 2120 mL    OUT:    Bulb (mL): 15 mL    Ileostomy (mL): 60 mL    Indwelling Catheter - Urethral (mL): 1800 mL  Total OUT: 1875 mL    Total NET: 245 mL      22 Oct 2022 07:01  -  22 Oct 2022 10:49  --------------------------------------------------------  IN:    Oral Fluid: 300 mL  Total IN: 300 mL    OUT:  Total OUT: 0 mL    Total NET: 300 mL          PHYSICAL EXAM    General: NAD, resting comfortably in bed  C/V: NSR  Pulm: Nonlabored breathing, no respiratory distress on room air  Abd: soft, ND, incisions clean/dry/intact, appropriate incisional TTP, no guarding, ostomy with gas and stool, ROSA with minimal serous fluid  : yepez draining clear yellow urine.  Extrem: WWP, no edema, SCDs in place        LABS:                        9.6    7.11  )-----------( 422      ( 22 Oct 2022 07:00 )             30.8     10-22    136  |  97  |  13  ----------------------------<  113<H>  3.8   |  30  |  0.55    Ca    9.8      22 Oct 2022 07:00  Phos  2.8     10-22  Mg     1.9     10-22    TPro  5.7<L>  /  Alb  3.2<L>  /  TBili  0.5  /  DBili  x   /  AST  59<H>  /  ALT  88<H>  /  AlkPhos  105  10-21          CULTURES:        Culture - Urine (collected 10-19-22 @ 10:59)  Source: Catheterized Catheterized  Final Report (10-20-22 @ 14:43):    >100,000 CFU/ml Candida kefyr Also known as Kluyveromyces marxianus    >100,000 CFU/ml Clavibacter species ( lusitaniae)        RADIOLOGY & ADDITIONAL STUDIES:

## 2022-10-22 NOTE — PROGRESS NOTE ADULT - ASSESSMENT
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 Was discharged and now readmitted 10/18 for high grade SBO found on outpatient imaging with concerns of pelvic collection s/p aspiration by IR 10/19, now pending Cx. Pending return of bowel function    Recommendations:  - Keep Degroot catheter while inpatient - do not remove even given UCx results  - adv diet per surgery  - please send another Cr on the ROSA drain fluid to assess for possible removal before discharge  - if becomes febrile, would treat UCx and collection Cx.  - discussed with attending  - no acute  interventions at this time  - f/u with Dr. Torre 10/31    CHAN Paz MD (PGY-2)  Consult Urology Resident  Please feel free to reach out on Teams Chat

## 2022-10-22 NOTE — PROGRESS NOTE ADULT - SUBJECTIVE AND OBJECTIVE BOX
Attending Surgeon Progress Note    coverage for aronoff    w/o complaints  tolerating clears      PHYSICAL EXAM:  Alert, oriented  Lungs:  CTA bilaterally, good inspiratory effort  Cor:  RRR  Abdomen:  soft, nondistended, ileostomy - functional, +bowel sounds,   Ext:  no edema, well-perfused    yepez- draining clear urine    a/p doing well  adv diet  decrease iv fluids  ambulate  dc planning

## 2022-10-23 ENCOUNTER — TRANSCRIPTION ENCOUNTER (OUTPATIENT)
Age: 67
End: 2022-10-23

## 2022-10-23 LAB
ANION GAP SERPL CALC-SCNC: 10 MMOL/L — SIGNIFICANT CHANGE UP (ref 5–17)
APTT BLD: 29 SEC — SIGNIFICANT CHANGE UP (ref 27.5–35.5)
BLD GP AB SCN SERPL QL: NEGATIVE — SIGNIFICANT CHANGE UP
BUN SERPL-MCNC: 9 MG/DL — SIGNIFICANT CHANGE UP (ref 7–23)
CALCIUM SERPL-MCNC: 10.4 MG/DL — SIGNIFICANT CHANGE UP (ref 8.4–10.5)
CHLORIDE SERPL-SCNC: 97 MMOL/L — SIGNIFICANT CHANGE UP (ref 96–108)
CO2 SERPL-SCNC: 28 MMOL/L — SIGNIFICANT CHANGE UP (ref 22–31)
CREAT SERPL-MCNC: 0.59 MG/DL — SIGNIFICANT CHANGE UP (ref 0.5–1.3)
EGFR: 106 ML/MIN/1.73M2 — SIGNIFICANT CHANGE UP
GLUCOSE SERPL-MCNC: 116 MG/DL — HIGH (ref 70–99)
HCT VFR BLD CALC: 36.1 % — LOW (ref 39–50)
HGB BLD-MCNC: 11.4 G/DL — LOW (ref 13–17)
INR BLD: 0.98 — SIGNIFICANT CHANGE UP (ref 0.88–1.16)
LACTATE SERPL-SCNC: 1.3 MMOL/L — SIGNIFICANT CHANGE UP (ref 0.5–2)
MAGNESIUM SERPL-MCNC: 2 MG/DL — SIGNIFICANT CHANGE UP (ref 1.6–2.6)
MCHC RBC-ENTMCNC: 25.2 PG — LOW (ref 27–34)
MCHC RBC-ENTMCNC: 31.6 GM/DL — LOW (ref 32–36)
MCV RBC AUTO: 79.7 FL — LOW (ref 80–100)
NRBC # BLD: 0 /100 WBCS — SIGNIFICANT CHANGE UP (ref 0–0)
PHOSPHATE SERPL-MCNC: 3 MG/DL — SIGNIFICANT CHANGE UP (ref 2.5–4.5)
PLATELET # BLD AUTO: 527 K/UL — HIGH (ref 150–400)
POTASSIUM SERPL-MCNC: 4.4 MMOL/L — SIGNIFICANT CHANGE UP (ref 3.5–5.3)
POTASSIUM SERPL-SCNC: 4.4 MMOL/L — SIGNIFICANT CHANGE UP (ref 3.5–5.3)
PROTHROM AB SERPL-ACNC: 11.6 SEC — SIGNIFICANT CHANGE UP (ref 10.5–13.4)
RBC # BLD: 4.53 M/UL — SIGNIFICANT CHANGE UP (ref 4.2–5.8)
RBC # FLD: 18.9 % — HIGH (ref 10.3–14.5)
RH IG SCN BLD-IMP: POSITIVE — SIGNIFICANT CHANGE UP
SARS-COV-2 RNA SPEC QL NAA+PROBE: NEGATIVE — SIGNIFICANT CHANGE UP
SODIUM SERPL-SCNC: 135 MMOL/L — SIGNIFICANT CHANGE UP (ref 135–145)
WBC # BLD: 10.73 K/UL — HIGH (ref 3.8–10.5)
WBC # FLD AUTO: 10.73 K/UL — HIGH (ref 3.8–10.5)

## 2022-10-23 PROCEDURE — 74177 CT ABD & PELVIS W/CONTRAST: CPT | Mod: 26

## 2022-10-23 PROCEDURE — 88307 TISSUE EXAM BY PATHOLOGIST: CPT | Mod: 26

## 2022-10-23 PROCEDURE — 71045 X-RAY EXAM CHEST 1 VIEW: CPT | Mod: 26

## 2022-10-23 PROCEDURE — 71045 X-RAY EXAM CHEST 1 VIEW: CPT | Mod: 26,77

## 2022-10-23 PROCEDURE — 99231 SBSQ HOSP IP/OBS SF/LOW 25: CPT

## 2022-10-23 PROCEDURE — 88305 TISSUE EXAM BY PATHOLOGIST: CPT | Mod: 26

## 2022-10-23 PROCEDURE — 74018 RADEX ABDOMEN 1 VIEW: CPT | Mod: 26

## 2022-10-23 DEVICE — STAPLER ETHICON PROXIMATE RELOAD 75MM BLUE: Type: IMPLANTABLE DEVICE | Status: FUNCTIONAL

## 2022-10-23 DEVICE — STAPLER LINEAR: Type: IMPLANTABLE DEVICE | Status: FUNCTIONAL

## 2022-10-23 DEVICE — STAPLER ETHICON PROXIMATE 75MM WITH BLUE RELOAD: Type: IMPLANTABLE DEVICE | Status: FUNCTIONAL

## 2022-10-23 RX ORDER — DIATRIZOATE MEGLUMINE 180 MG/ML
30 INJECTION, SOLUTION INTRAVESICAL ONCE
Refills: 0 | Status: COMPLETED | OUTPATIENT
Start: 2022-10-23 | End: 2022-10-23

## 2022-10-23 RX ORDER — PIPERACILLIN AND TAZOBACTAM 4; .5 G/20ML; G/20ML
3.38 INJECTION, POWDER, LYOPHILIZED, FOR SOLUTION INTRAVENOUS ONCE
Refills: 0 | Status: COMPLETED | OUTPATIENT
Start: 2022-10-23 | End: 2022-10-23

## 2022-10-23 RX ORDER — SODIUM CHLORIDE 9 MG/ML
1000 INJECTION, SOLUTION INTRAVENOUS
Refills: 0 | Status: DISCONTINUED | OUTPATIENT
Start: 2022-10-23 | End: 2022-10-25

## 2022-10-23 RX ORDER — SIMETHICONE 80 MG/1
80 TABLET, CHEWABLE ORAL ONCE
Refills: 0 | Status: COMPLETED | OUTPATIENT
Start: 2022-10-23 | End: 2022-10-23

## 2022-10-23 RX ADMIN — ONDANSETRON 4 MILLIGRAM(S): 8 TABLET, FILM COATED ORAL at 02:34

## 2022-10-23 RX ADMIN — SODIUM CHLORIDE 100 MILLILITER(S): 9 INJECTION, SOLUTION INTRAVENOUS at 08:08

## 2022-10-23 RX ADMIN — CHLORHEXIDINE GLUCONATE 1 APPLICATION(S): 213 SOLUTION TOPICAL at 06:07

## 2022-10-23 RX ADMIN — HEPARIN SODIUM 5000 UNIT(S): 5000 INJECTION INTRAVENOUS; SUBCUTANEOUS at 13:47

## 2022-10-23 RX ADMIN — ONDANSETRON 4 MILLIGRAM(S): 8 TABLET, FILM COATED ORAL at 09:00

## 2022-10-23 RX ADMIN — Medication 650 MILLIGRAM(S): at 00:04

## 2022-10-23 RX ADMIN — DIATRIZOATE MEGLUMINE 30 MILLILITER(S): 180 INJECTION, SOLUTION INTRAVESICAL at 17:00

## 2022-10-23 RX ADMIN — PIPERACILLIN AND TAZOBACTAM 25 GRAM(S): 4; .5 INJECTION, POWDER, LYOPHILIZED, FOR SOLUTION INTRAVENOUS at 22:35

## 2022-10-23 RX ADMIN — HEPARIN SODIUM 5000 UNIT(S): 5000 INJECTION INTRAVENOUS; SUBCUTANEOUS at 06:08

## 2022-10-23 RX ADMIN — SIMETHICONE 80 MILLIGRAM(S): 80 TABLET, CHEWABLE ORAL at 01:40

## 2022-10-23 RX ADMIN — PANTOPRAZOLE SODIUM 40 MILLIGRAM(S): 20 TABLET, DELAYED RELEASE ORAL at 06:08

## 2022-10-23 NOTE — PROGRESS NOTE ADULT - ASSESSMENT
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 Was discharged and now readmitted 10/18 for high grade SBO found on outpatient imaging with concerns of pelvic collection s/p aspiration by IR 10/19, now pending Cx. Pending return of bowel function. ROSA Cr 10/22 22.17, 10/22 2.54    Recommendations:  - Keep Degroot catheter while inpatient - do not remove even given UCx results  - NPO/NGT/diet per general surgery  - if becomes febrile, would treat UCx and collection Cx.  - discussed with attending  - no acute  interventions at this time  - please keep ROSA drain for now  - f/u with Dr. Torre 10/31    CHAN Paz MD (PGY-2)  Consult Urology Resident  Please feel free to reach out on Teams Chat

## 2022-10-23 NOTE — PROGRESS NOTE ADULT - ASSESSMENT
68 yo M with PMH HTN, HLD, asthma, diverticulitis with colovescicular fistula and colon cancer s/p R hemicolectomy (2016) and now s/p exploratory laparotomy, segmental colon resection, resection of colovesicular fistula, partial cystectomy, bladder diverticulectomy, colorectal anastomosis, rigid sigmoidoscopy, loop ileostomy creation (10/06/22) complicated by a bladder leak (elevated Cr in ROSA) presenting with decreased ileostomy output and feeling bloated. Sent into the ED after findings of obstruction on CT scan. In th ED afeb, VSS, WBC 20. CT scan with SBO and complex fluid collection within the anterior pelvis measuring 10 by 5.6 cm. Admitted for further management. 68 yo M with PMH HTN, HLD, asthma, diverticulitis with colovescicular fistula and colon cancer s/p R hemicolectomy (2016) and now s/p exploratory laparotomy, segmental colon resection, resection of colovesicular fistula, partial cystectomy, bladder diverticulectomy, colorectal anastomosis, rigid sigmoidoscopy, loop ileostomy creation (10/06/22) complicated by a bladder leak (elevated Cr in ROSA) presenting with decreased ileostomy output and feeling bloated. Sent into the ED after findings of obstruction on CT scan. In th ED afeb, VSS, WBC 20. CT scan with SBO and complex fluid collection within the anterior pelvis measuring 10 by 5.6 cm. Admitted for further management.    LRD/IVF  PICC  Pain/Nausea control PRN   SCDs/HSQ  IS/OOBA  JPx1  F/u urology recs   F/u ID recs    68 yo M with PMH HTN, HLD, asthma, diverticulitis with colovescicular fistula and colon cancer s/p R hemicolectomy (2016) and now s/p exploratory laparotomy, segmental colon resection, resection of colovesicular fistula, partial cystectomy, bladder diverticulectomy, colorectal anastomosis, rigid sigmoidoscopy, loop ileostomy creation (10/06/22) complicated by a bladder leak (elevated Cr in ROSA) presenting with decreased ileostomy output and feeling bloated. Sent into the ED after findings of obstruction on CT scan. In th ED afeb, VSS, WBC 20. CT scan with SBO and complex fluid collection within the anterior pelvis measuring 10 by 5.6 cm. Admitted for further management.    NPO/IVF  KUB  PICC/TPN  Pain/Nausea control PRN   SCDs/HSQ  IS/OOBA  JPx1  F/u urology recs   F/u ID recs    66 yo M with PMH HTN, HLD, asthma, diverticulitis with colovescicular fistula and colon cancer s/p R hemicolectomy (2016) and now s/p exploratory laparotomy, segmental colon resection, resection of colovesicular fistula, partial cystectomy, bladder diverticulectomy, colorectal anastomosis, rigid sigmoidoscopy, loop ileostomy creation (10/06/22) complicated by a bladder leak (elevated Cr in ROSA) presenting with decreased ileostomy output and feeling bloated. Sent into the ED after findings of obstruction on CT scan. In th ED afeb, VSS, WBC 20. CT scan with SBO and complex fluid collection within the anterior pelvis measuring 10 by 5.6 cm. Admitted for further management.    NPO/IVF  KUB  NGT - LIWS  DC - [employer]   PICC/TPN  Pain/Nausea control PRN   SCDs/HSQ  IS/OOBA  JPx1  F/u urology recs   F/u ID recs

## 2022-10-23 NOTE — PROGRESS NOTE ADULT - SUBJECTIVE AND OBJECTIVE BOX
Coverage for Aronoff    complains of bloating    VSS  ileostomy minimal    abd- soft - distended    sbo/ileus  npo  abdominal xray  iv fluids.  observe  possible CT 10/24 if symptoms persist

## 2022-10-23 NOTE — PROGRESS NOTE ADULT - SUBJECTIVE AND OBJECTIVE BOX
UROLOGY PROGRESS NOTE    SUBJECTIVE: Patient seen and examined bedside. Vomited overnight and this morning with persistent nausea. NGT was replaced. Patient tolerating yepez, is ambulating.    heparin   Injectable 5000 Unit(s) SubCutaneous every 8 hours      Vital Signs Last 24 Hrs  T(C): 36.8 (23 Oct 2022 08:48), Max: 36.8 (23 Oct 2022 05:03)  T(F): 98.2 (23 Oct 2022 08:48), Max: 98.3 (23 Oct 2022 05:03)  HR: 77 (23 Oct 2022 08:48) (67 - 77)  BP: 136/81 (23 Oct 2022 08:48) (132/84 - 151/87)  BP(mean): --  RR: 17 (23 Oct 2022 08:48) (17 - 19)  SpO2: 97% (23 Oct 2022 08:48) (95% - 99%)    Parameters below as of 23 Oct 2022 08:48  Patient On (Oxygen Delivery Method): room air      I&O's Detail    22 Oct 2022 07:01  -  23 Oct 2022 07:00  --------------------------------------------------------  IN:    Oral Fluid: 600 mL  Total IN: 600 mL    OUT:    Bulb (mL): 10 mL    Ileostomy (mL): 35 mL    Indwelling Catheter - Urethral (mL): 1700 mL  Total OUT: 1745 mL    Total NET: -1145 mL      23 Oct 2022 07:01  -  23 Oct 2022 11:04  --------------------------------------------------------  IN:  Total IN: 0 mL    OUT:    Emesis (mL): 50 mL    Oral Fluid: 0 mL  Total OUT: 50 mL    Total NET: -50 mL          PHYSICAL EXAM    General: NAD, resting comfortably in bed, NGT in place with bilious output  C/V: NSR  Pulm: Nonlabored breathing, no respiratory distress on room air  Abd: soft, mildly distended, incisions clean/dry/intact, appropriate incisional TTP, no guarding, ostomy with gas and stool, ROSA with minimal serous fluid  : yepez draining clear yellow urine.  Extrem: WWP, no edema, SCDs in place        LABS:                        11.4   10.73 )-----------( 527      ( 23 Oct 2022 05:30 )             36.1     10-23    135  |  97  |  9   ----------------------------<  116<H>  4.4   |  28  |  0.59    Ca    10.4      23 Oct 2022 05:30  Phos  3.0     10-23  Mg     2.0     10-23            CULTURES:        Culture - Urine (collected 10-19-22 @ 10:59)  Source: Catheterized Catheterized  Final Report (10-20-22 @ 14:43):    >100,000 CFU/ml Candida kefyr Also known as Kluyveromyces marxianus    >100,000 CFU/ml Clavibacter species ( lusitaniae)        RADIOLOGY & ADDITIONAL STUDIES:

## 2022-10-23 NOTE — PROGRESS NOTE ADULT - SUBJECTIVE AND OBJECTIVE BOX
STATUS POST: Resection of colovesicular fistula and DLI     SUBJECTIVE: Patient seen and examined bedside by chief resident. He notes significant gas discomfort and 1x episode of emesis overnight. He also notes current feeling of needing to vomit. Some gas and minimal ostomy output at this time.    heparin   Injectable 5000 Unit(s) SubCutaneous every 8 hours      Vital Signs Last 24 Hrs  T(C): 36.8 (23 Oct 2022 05:03), Max: 36.8 (23 Oct 2022 05:03)  T(F): 98.3 (23 Oct 2022 05:03), Max: 98.3 (23 Oct 2022 05:03)  HR: 76 (23 Oct 2022 05:03) (67 - 77)  BP: 146/88 (23 Oct 2022 05:03) (126/74 - 151/87)  BP(mean): --  RR: 18 (23 Oct 2022 05:03) (17 - 19)  SpO2: 95% (23 Oct 2022 05:03) (95% - 100%)    Parameters below as of 23 Oct 2022 05:03  Patient On (Oxygen Delivery Method): room air      I&O's Detail    22 Oct 2022 07:01  -  23 Oct 2022 07:00  --------------------------------------------------------  IN:    Oral Fluid: 600 mL  Total IN: 600 mL    OUT:    Bulb (mL): 10 mL    Ileostomy (mL): 35 mL    Indwelling Catheter - Urethral (mL): 1700 mL  Total OUT: 1745 mL    Total NET: -1145 mL      Neuro: Alert and Oriented X 4. No apparent focal neural deficits  HEENT: NCAT. Mucous membranes moist. EOMI  Respiratory:  No respiratory distress  Cardiovascular: Non tachy/veronica  Gastrointestinal: soft, NT/mildly distended. No rebound/guarding                 Incision: c/d/i, ROSA minimal serous fluid                 Ostomy: p/p/p, gas with minimal dark brown ostomy output  Extremities: (-) edema b/l. SCDs in place.         LABS:                        11.4   10.73 )-----------( 527      ( 23 Oct 2022 05:30 )             36.1     10-23    135  |  97  |  9   ----------------------------<  116<H>  4.4   |  28  |  0.59    Ca    10.4      23 Oct 2022 05:30  Phos  3.0     10-23  Mg     2.0     10-23            RADIOLOGY & ADDITIONAL STUDIES:

## 2022-10-24 LAB
ANION GAP SERPL CALC-SCNC: 10 MMOL/L — SIGNIFICANT CHANGE UP (ref 5–17)
ANION GAP SERPL CALC-SCNC: 9 MMOL/L — SIGNIFICANT CHANGE UP (ref 5–17)
BUN SERPL-MCNC: 12 MG/DL — SIGNIFICANT CHANGE UP (ref 7–23)
BUN SERPL-MCNC: 17 MG/DL — SIGNIFICANT CHANGE UP (ref 7–23)
CALCIUM SERPL-MCNC: 9.7 MG/DL — SIGNIFICANT CHANGE UP (ref 8.4–10.5)
CALCIUM SERPL-MCNC: 9.7 MG/DL — SIGNIFICANT CHANGE UP (ref 8.4–10.5)
CHLORIDE SERPL-SCNC: 100 MMOL/L — SIGNIFICANT CHANGE UP (ref 96–108)
CHLORIDE SERPL-SCNC: 102 MMOL/L — SIGNIFICANT CHANGE UP (ref 96–108)
CO2 SERPL-SCNC: 26 MMOL/L — SIGNIFICANT CHANGE UP (ref 22–31)
CO2 SERPL-SCNC: 27 MMOL/L — SIGNIFICANT CHANGE UP (ref 22–31)
CREAT SERPL-MCNC: 0.76 MG/DL — SIGNIFICANT CHANGE UP (ref 0.5–1.3)
CREAT SERPL-MCNC: 0.91 MG/DL — SIGNIFICANT CHANGE UP (ref 0.5–1.3)
EGFR: 92 ML/MIN/1.73M2 — SIGNIFICANT CHANGE UP
EGFR: 98 ML/MIN/1.73M2 — SIGNIFICANT CHANGE UP
GLUCOSE SERPL-MCNC: 111 MG/DL — HIGH (ref 70–99)
GLUCOSE SERPL-MCNC: 126 MG/DL — HIGH (ref 70–99)
HCT VFR BLD CALC: 30.1 % — LOW (ref 39–50)
HCT VFR BLD CALC: 36.2 % — LOW (ref 39–50)
HGB BLD-MCNC: 11.4 G/DL — LOW (ref 13–17)
HGB BLD-MCNC: 9.6 G/DL — LOW (ref 13–17)
MAGNESIUM SERPL-MCNC: 1.5 MG/DL — LOW (ref 1.6–2.6)
MAGNESIUM SERPL-MCNC: 2.6 MG/DL — SIGNIFICANT CHANGE UP (ref 1.6–2.6)
MCHC RBC-ENTMCNC: 25.4 PG — LOW (ref 27–34)
MCHC RBC-ENTMCNC: 25.9 PG — LOW (ref 27–34)
MCHC RBC-ENTMCNC: 31.5 GM/DL — LOW (ref 32–36)
MCHC RBC-ENTMCNC: 31.9 GM/DL — LOW (ref 32–36)
MCV RBC AUTO: 80.8 FL — SIGNIFICANT CHANGE UP (ref 80–100)
MCV RBC AUTO: 81.4 FL — SIGNIFICANT CHANGE UP (ref 80–100)
NRBC # BLD: 0 /100 WBCS — SIGNIFICANT CHANGE UP (ref 0–0)
NRBC # BLD: 0 /100 WBCS — SIGNIFICANT CHANGE UP (ref 0–0)
PHOSPHATE SERPL-MCNC: 3.8 MG/DL — SIGNIFICANT CHANGE UP (ref 2.5–4.5)
PHOSPHATE SERPL-MCNC: 4.1 MG/DL — SIGNIFICANT CHANGE UP (ref 2.5–4.5)
PLATELET # BLD AUTO: 432 K/UL — HIGH (ref 150–400)
PLATELET # BLD AUTO: 494 K/UL — HIGH (ref 150–400)
POTASSIUM SERPL-MCNC: 4.9 MMOL/L — SIGNIFICANT CHANGE UP (ref 3.5–5.3)
POTASSIUM SERPL-MCNC: 5.3 MMOL/L — SIGNIFICANT CHANGE UP (ref 3.5–5.3)
POTASSIUM SERPL-SCNC: 4.9 MMOL/L — SIGNIFICANT CHANGE UP (ref 3.5–5.3)
POTASSIUM SERPL-SCNC: 5.3 MMOL/L — SIGNIFICANT CHANGE UP (ref 3.5–5.3)
RBC # BLD: 3.7 M/UL — LOW (ref 4.2–5.8)
RBC # BLD: 4.48 M/UL — SIGNIFICANT CHANGE UP (ref 4.2–5.8)
RBC # FLD: 19.3 % — HIGH (ref 10.3–14.5)
RBC # FLD: 19.6 % — HIGH (ref 10.3–14.5)
SODIUM SERPL-SCNC: 136 MMOL/L — SIGNIFICANT CHANGE UP (ref 135–145)
SODIUM SERPL-SCNC: 138 MMOL/L — SIGNIFICANT CHANGE UP (ref 135–145)
WBC # BLD: 16.96 K/UL — HIGH (ref 3.8–10.5)
WBC # BLD: 17.42 K/UL — HIGH (ref 3.8–10.5)
WBC # FLD AUTO: 16.96 K/UL — HIGH (ref 3.8–10.5)
WBC # FLD AUTO: 17.42 K/UL — HIGH (ref 3.8–10.5)

## 2022-10-24 RX ORDER — MAGNESIUM SULFATE 500 MG/ML
2 VIAL (ML) INJECTION EVERY 6 HOURS
Refills: 0 | Status: COMPLETED | OUTPATIENT
Start: 2022-10-24 | End: 2022-10-24

## 2022-10-24 RX ORDER — SODIUM CHLORIDE 9 MG/ML
1000 INJECTION, SOLUTION INTRAVENOUS ONCE
Refills: 0 | Status: COMPLETED | OUTPATIENT
Start: 2022-10-24 | End: 2022-10-24

## 2022-10-24 RX ORDER — SODIUM CHLORIDE 9 MG/ML
1000 INJECTION INTRAMUSCULAR; INTRAVENOUS; SUBCUTANEOUS ONCE
Refills: 0 | Status: COMPLETED | OUTPATIENT
Start: 2022-10-24 | End: 2022-10-24

## 2022-10-24 RX ORDER — HYDROMORPHONE HYDROCHLORIDE 2 MG/ML
1 INJECTION INTRAMUSCULAR; INTRAVENOUS; SUBCUTANEOUS EVERY 6 HOURS
Refills: 0 | Status: DISCONTINUED | OUTPATIENT
Start: 2022-10-24 | End: 2022-10-29

## 2022-10-24 RX ORDER — SODIUM CHLORIDE 9 MG/ML
500 INJECTION, SOLUTION INTRAVENOUS ONCE
Refills: 0 | Status: COMPLETED | OUTPATIENT
Start: 2022-10-24 | End: 2022-10-24

## 2022-10-24 RX ORDER — HYDROMORPHONE HYDROCHLORIDE 2 MG/ML
0.5 INJECTION INTRAMUSCULAR; INTRAVENOUS; SUBCUTANEOUS EVERY 4 HOURS
Refills: 0 | Status: DISCONTINUED | OUTPATIENT
Start: 2022-10-24 | End: 2022-10-29

## 2022-10-24 RX ORDER — PANTOPRAZOLE SODIUM 20 MG/1
40 TABLET, DELAYED RELEASE ORAL EVERY 24 HOURS
Refills: 0 | Status: DISCONTINUED | OUTPATIENT
Start: 2022-10-24 | End: 2022-11-01

## 2022-10-24 RX ORDER — HEPARIN SODIUM 5000 [USP'U]/ML
5000 INJECTION INTRAVENOUS; SUBCUTANEOUS EVERY 8 HOURS
Refills: 0 | Status: DISCONTINUED | OUTPATIENT
Start: 2022-10-24 | End: 2022-11-01

## 2022-10-24 RX ADMIN — HYDROMORPHONE HYDROCHLORIDE 0.5 MILLIGRAM(S): 2 INJECTION INTRAMUSCULAR; INTRAVENOUS; SUBCUTANEOUS at 02:26

## 2022-10-24 RX ADMIN — HYDROMORPHONE HYDROCHLORIDE 1 MILLIGRAM(S): 2 INJECTION INTRAMUSCULAR; INTRAVENOUS; SUBCUTANEOUS at 07:54

## 2022-10-24 RX ADMIN — HEPARIN SODIUM 5000 UNIT(S): 5000 INJECTION INTRAVENOUS; SUBCUTANEOUS at 21:48

## 2022-10-24 RX ADMIN — SODIUM CHLORIDE 100 MILLILITER(S): 9 INJECTION, SOLUTION INTRAVENOUS at 01:35

## 2022-10-24 RX ADMIN — HYDROMORPHONE HYDROCHLORIDE 0.5 MILLIGRAM(S): 2 INJECTION INTRAMUSCULAR; INTRAVENOUS; SUBCUTANEOUS at 13:45

## 2022-10-24 RX ADMIN — Medication 25 GRAM(S): at 09:20

## 2022-10-24 RX ADMIN — HYDROMORPHONE HYDROCHLORIDE 0.5 MILLIGRAM(S): 2 INJECTION INTRAMUSCULAR; INTRAVENOUS; SUBCUTANEOUS at 23:13

## 2022-10-24 RX ADMIN — SODIUM CHLORIDE 1000 MILLILITER(S): 9 INJECTION INTRAMUSCULAR; INTRAVENOUS; SUBCUTANEOUS at 21:48

## 2022-10-24 RX ADMIN — HYDROMORPHONE HYDROCHLORIDE 0.5 MILLIGRAM(S): 2 INJECTION INTRAMUSCULAR; INTRAVENOUS; SUBCUTANEOUS at 12:57

## 2022-10-24 RX ADMIN — HYDROMORPHONE HYDROCHLORIDE 1 MILLIGRAM(S): 2 INJECTION INTRAMUSCULAR; INTRAVENOUS; SUBCUTANEOUS at 08:18

## 2022-10-24 RX ADMIN — SODIUM CHLORIDE 1000 MILLILITER(S): 9 INJECTION, SOLUTION INTRAVENOUS at 14:41

## 2022-10-24 RX ADMIN — SODIUM CHLORIDE 100 MILLILITER(S): 9 INJECTION, SOLUTION INTRAVENOUS at 15:43

## 2022-10-24 RX ADMIN — CHLORHEXIDINE GLUCONATE 1 APPLICATION(S): 213 SOLUTION TOPICAL at 09:20

## 2022-10-24 RX ADMIN — SODIUM CHLORIDE 1000 MILLILITER(S): 9 INJECTION, SOLUTION INTRAVENOUS at 12:56

## 2022-10-24 RX ADMIN — HYDROMORPHONE HYDROCHLORIDE 0.5 MILLIGRAM(S): 2 INJECTION INTRAMUSCULAR; INTRAVENOUS; SUBCUTANEOUS at 23:50

## 2022-10-24 RX ADMIN — HYDROMORPHONE HYDROCHLORIDE 0.5 MILLIGRAM(S): 2 INJECTION INTRAMUSCULAR; INTRAVENOUS; SUBCUTANEOUS at 03:01

## 2022-10-24 RX ADMIN — SODIUM CHLORIDE 1000 MILLILITER(S): 9 INJECTION, SOLUTION INTRAVENOUS at 03:02

## 2022-10-24 RX ADMIN — PANTOPRAZOLE SODIUM 40 MILLIGRAM(S): 20 TABLET, DELAYED RELEASE ORAL at 02:12

## 2022-10-24 RX ADMIN — HYDROMORPHONE HYDROCHLORIDE 0.5 MILLIGRAM(S): 2 INJECTION INTRAMUSCULAR; INTRAVENOUS; SUBCUTANEOUS at 18:30

## 2022-10-24 RX ADMIN — SODIUM CHLORIDE 1000 MILLILITER(S): 9 INJECTION, SOLUTION INTRAVENOUS at 06:24

## 2022-10-24 RX ADMIN — SODIUM CHLORIDE 1000 MILLILITER(S): 9 INJECTION, SOLUTION INTRAVENOUS at 16:35

## 2022-10-24 RX ADMIN — Medication 25 GRAM(S): at 03:02

## 2022-10-24 RX ADMIN — HYDROMORPHONE HYDROCHLORIDE 0.5 MILLIGRAM(S): 2 INJECTION INTRAMUSCULAR; INTRAVENOUS; SUBCUTANEOUS at 18:00

## 2022-10-24 RX ADMIN — HEPARIN SODIUM 5000 UNIT(S): 5000 INJECTION INTRAVENOUS; SUBCUTANEOUS at 15:42

## 2022-10-24 NOTE — PROGRESS NOTE ADULT - SUBJECTIVE AND OBJECTIVE BOX
SUBJECTIVE: Pt seen and examined at bedside with chief. Pain well controlled. Denies n/v, NGT in place. Denies any BF    MEDICATIONS  (STANDING):  chlorhexidine 2% Cloths 1 Application(s) Topical <User Schedule>  influenza  Vaccine (HIGH DOSE) 0.7 milliLiter(s) IntraMuscular once  lactated ringers. 1000 milliLiter(s) (100 mL/Hr) IV Continuous <Continuous>  magnesium sulfate  IVPB 2 Gram(s) IV Intermittent every 6 hours  pantoprazole  Injectable 40 milliGRAM(s) IV Push every 24 hours    MEDICATIONS  (PRN):  acetaminophen     Tablet .. 650 milliGRAM(s) Oral every 6 hours PRN Mild Pain (1 - 3), Moderate Pain (4 - 6)  HYDROmorphone  Injectable 0.5 milliGRAM(s) IV Push every 4 hours PRN Moderate Pain (4 - 6)  HYDROmorphone  Injectable 1 milliGRAM(s) IV Push every 6 hours PRN Severe Pain (7 - 10)  ondansetron Injectable 4 milliGRAM(s) IV Push every 6 hours PRN Nausea  sodium chloride 0.9% lock flush 10 milliLiter(s) IV Push every 1 hour PRN Pre/post blood products, medications, blood draw, and to maintain line patency      Vital Signs Last 24 Hrs  T(C): 36.7 (24 Oct 2022 04:49), Max: 37.1 (23 Oct 2022 16:49)  T(F): 98 (24 Oct 2022 04:49), Max: 98.7 (23 Oct 2022 16:49)  HR: 78 (24 Oct 2022 04:49) (71 - 100)  BP: 135/70 (24 Oct 2022 04:49) (115/80 - 137/78)  BP(mean): 100 (24 Oct 2022 03:05) (91 - 101)  RR: 17 (24 Oct 2022 04:49) (17 - 24)  SpO2: 96% (24 Oct 2022 04:49) (95% - 99%)    Parameters below as of 24 Oct 2022 04:49  Patient On (Oxygen Delivery Method): nasal cannula  O2 Flow (L/min): 2      PHYSICAL EXAM:      Constitutional: A&Ox3    Respiratory: non labored breathing, no respiratory distress    Cardiovascular: NSR, RRR    Gastrointestinal:  Soft, mildly distended. appropriately tender. NGT in place                 Incision: large midline dressings. ostomy pink and patent without any air/stool in bag. ROSA SSx1    Genitourinary: yepez to gravity     Extremities: (-) edema                  I&O's Detail    23 Oct 2022 07:01  -  24 Oct 2022 07:00  --------------------------------------------------------  IN:    Lactated Ringers: 1800 mL    Lactated Ringers Bolus: 1000 mL  Total IN: 2800 mL    OUT:    Bulb (mL): 100 mL    Emesis (mL): 50 mL    Ileostomy (mL): 0 mL    Indwelling Catheter - Urethral (mL): 1090 mL    Nasogastric/Oral tube (mL): 1730 mL    Oral Fluid: 0 mL    Voided (mL): 100 mL  Total OUT: 3070 mL    Total NET: -270 mL          LABS:                        9.6    16.96 )-----------( 432      ( 24 Oct 2022 01:55 )             30.1     10-24    136  |  100  |  12  ----------------------------<  126<H>  4.9   |  27  |  0.76    Ca    9.7      24 Oct 2022 01:55  Phos  3.8     10-24  Mg     1.5     10-24      PT/INR - ( 23 Oct 2022 21:00 )   PT: 11.6 sec;   INR: 0.98          PTT - ( 23 Oct 2022 21:00 )  PTT:29.0 sec      RADIOLOGY & ADDITIONAL STUDIES:

## 2022-10-24 NOTE — PROGRESS NOTE ADULT - ASSESSMENT
67 M with PMH HTN, HLD, asthma, diverticulitis with colovescicular fistula and colon cancer s/p R hemicolectomy (2016) and now s/p exploratory laparotomy, segmental colon resection, resection of colovesicular fistula, partial cystectomy, bladder diverticulectomy, colorectal anastomosis, rigid sigmoidoscopy, loop ileostomy creation (10/06/22) complicated by a bladder leak (elevated Cr in ROSA) presenting with decreased ileostomy output and feeling bloated. Sent into the ED after findings of obstruction on CT scan. In th ED afeb, VSS, WBC 20. CT scan with SBO and complex fluid collection within the anterior pelvis measuring 10 by 5.6 cm now s/p exploratory laparotomy, SBR, partial omentectomy (10/24).    NPO/IVF  NGT to LIWS  PICC  Pain/Nausea control PRN   SCDs/HSQ  IS/OOBA  JPx1  F/u urology recs   F/u ID recs

## 2022-10-24 NOTE — PROGRESS NOTE ADULT - SUBJECTIVE AND OBJECTIVE BOX
UROLOGY PROGRESS NOTE    SUBJECTIVE: Patient seen and examined bedside. Went for surgery overnight for closed loop bowel obstruction, tired today. No fevers/chills, ngt in place. Tolerating yepez        Vital Signs Last 24 Hrs  T(C): 36.3 (24 Oct 2022 09:00), Max: 37.1 (23 Oct 2022 16:49)  T(F): 97.3 (24 Oct 2022 09:00), Max: 98.7 (23 Oct 2022 16:49)  HR: 100 (24 Oct 2022 09:00) (71 - 100)  BP: 127/77 (24 Oct 2022 09:00) (115/80 - 137/78)  BP(mean): 100 (24 Oct 2022 03:05) (91 - 101)  RR: 16 (24 Oct 2022 09:00) (16 - 24)  SpO2: 96% (24 Oct 2022 09:00) (95% - 99%)    Parameters below as of 24 Oct 2022 09:00  Patient On (Oxygen Delivery Method): nasal cannula  O2 Flow (L/min): 2    I&O's Detail    23 Oct 2022 07:01  -  24 Oct 2022 07:00  --------------------------------------------------------  IN:    Lactated Ringers: 1800 mL    Lactated Ringers Bolus: 1000 mL  Total IN: 2800 mL    OUT:    Bulb (mL): 100 mL    Emesis (mL): 50 mL    Ileostomy (mL): 0 mL    Indwelling Catheter - Urethral (mL): 1090 mL    Nasogastric/Oral tube (mL): 1730 mL    Oral Fluid: 0 mL    Voided (mL): 100 mL  Total OUT: 3070 mL    Total NET: -270 mL      24 Oct 2022 07:01  -  24 Oct 2022 10:08  --------------------------------------------------------  IN:    IV PiggyBack: 50 mL    Lactated Ringers: 300 mL  Total IN: 350 mL    OUT:  Total OUT: 0 mL    Total NET: 350 mL          PHYSICAL EXAM    General: NAD, resting comfortably in bed, NGT in place with bilious output  C/V: NSR  Pulm: Nonlabored breathing, no respiratory distress on room air  Abd: soft, mildly distended, appropriate incisional TTP, dressings clean/dry/intact, ostomy with fluid on RLQ, ROSA LLQ serosanguinous  : yepez draining clear yellow urine.  Extrem: WWP, no edema, SCDs in place        LABS:                        9.6    16.96 )-----------( 432      ( 24 Oct 2022 01:55 )             30.1     10-24    136  |  100  |  12  ----------------------------<  126<H>  4.9   |  27  |  0.76    Ca    9.7      24 Oct 2022 01:55  Phos  3.8     10-24  Mg     1.5     10-24      PT/INR - ( 23 Oct 2022 21:00 )   PT: 11.6 sec;   INR: 0.98          PTT - ( 23 Oct 2022 21:00 )  PTT:29.0 sec      CULTURES:        Culture - Urine (collected 10-19-22 @ 10:59)  Source: Catheterized Catheterized  Final Report (10-20-22 @ 14:43):    >100,000 CFU/ml Candida kefyr Also known as Kluyveromyces marxianus    >100,000 CFU/ml Clavibacter species ( lusitaniae)        RADIOLOGY & ADDITIONAL STUDIES:

## 2022-10-24 NOTE — PROGRESS NOTE ADULT - SUBJECTIVE AND OBJECTIVE BOX
POST OP CHECK    Procedure: Exploratory laparotomy, SBR, partial omentectomy   Surgeon: Dr. Zhang    S: Pt seen and examined at bedside. Reports no acute complaints. Denies F, N, V, CP, SOB, ESTRADA, calf tenderness. Pain controlled with medication. Given 1L bolus earlier for low UOP. Post op labs appreciated, HD stable.    O:  T(C): 36.3 (10-24-22 @ 03:27), Max: 36.3 (10-24-22 @ 03:27)  T(F): 97.3 (10-24-22 @ 03:27), Max: 97.3 (10-24-22 @ 03:27)  HR: 71 (10-24-22 @ 03:28) (71 - 83)  BP: 132/76 (10-24-22 @ 03:28) (116/75 - 137/78)  RR: 17 (10-24-22 @ 03:28) (17 - 24)  SpO2: 99% (10-24-22 @ 03:28) (95% - 99%)  Wt(kg): --                        9.6    16.96 )-----------( 432      ( 24 Oct 2022 01:55 )             30.1     10-24    136  |  100  |  12  ----------------------------<  126<H>  4.9   |  27  |  0.76    Ca    9.7      24 Oct 2022 01:55  Phos  3.8     10-24  Mg     1.5     10-24      Gen: NAD, resting comfortably in bed, A&O x3. NGT in place   C/V: NSR  Pulm: Nonlabored breathing, no respiratory distress  Abd: abdomen soft, nd, appropriately ttp to surgical site. Dressings c/d/i. ROSA x1 w/ SS OP. Ostomy w/ stoma pink and viable, no gas/stool in bag  : Degroot in place   Extrem: WWP, no calf tenderness or edema, SCDs in place      A/P: 67 M with PMH HTN, HLD, asthma, diverticulitis with colovescicular fistula and colon cancer s/p R hemicolectomy (2016) and now s/p exploratory laparotomy, segmental colon resection, resection of colovesicular fistula, partial cystectomy, bladder diverticulectomy, colorectal anastomosis, rigid sigmoidoscopy, loop ileostomy creation (10/06/22) complicated by a bladder leak (elevated Cr in ROSA) presenting with decreased ileostomy output and feeling bloated. Sent into the ED after findings of obstruction on CT scan. In th ED afeb, VSS, WBC 20. CT scan with SBO and complex fluid collection within the anterior pelvis measuring 10 by 5.6 cm now s/p exploratory laparotomy, SBR, partial omentectomy (10/24).    NPO/IVF  NGT to LIWS  PICC  Pain/Nausea control PRN; PCA  SCDs/HSQ  IS/OOBA  JPx1

## 2022-10-24 NOTE — BRIEF OPERATIVE NOTE - OPERATION/FINDINGS
Exploratory laparotomy via midline incision. Abdomen explored and ~20 cm of small bowel identified with closed loop around adhesion and yanci drain. Resection of ~20 cm of jejunum using KENNY stapler with creation of side to side anastomosis using KENNY stapler for common channel and TA. Mesenteric defect closed using running Vicryl suture. Bowel run with evidence of 3 serosal teas that were primarily repaired using Vicryl suture. Fascia closed using running PDS. Skin left open with wet-to dry packing. One 19F yanci drain replaced in pelvis.

## 2022-10-24 NOTE — BRIEF OPERATIVE NOTE - NSICDXBRIEFPROCEDURE_GEN_ALL_CORE_FT
PROCEDURES:  Exploratory laparotomy 24-Oct-2022 01:20:16  Savannah Ramirez  Small bowel resection 24-Oct-2022 01:20:49  Savannah Ramirez  Omentectomy 24-Oct-2022 01:21:00  Savannah Ramirez

## 2022-10-24 NOTE — PROGRESS NOTE ADULT - ASSESSMENT
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 Was discharged and now readmitted 10/18 for high grade SBO found on outpatient imaging with concerns of pelvic collection s/p aspiration by IR 10/19, now pending Cx. Pending return of bowel function. ROSA Cr 10/22 22.17, 10/22 2.54 s/p SBR 10/24    Recommendations:  - Keep Degroot catheter while inpatient - do not remove even given UCx results  - NPO/NGT/diet per general surgery  - if becomes febrile, would treat UCx and collection Cx.  - discussed with attending  - no acute  interventions at this time  - please keep ROSA drain for now  - f/u with Dr. Torre 10/31    CHAN Paz MD (PGY-2)  Consult Urology Resident  Please feel free to reach out on Teams Chat

## 2022-10-25 DIAGNOSIS — J45.909 UNSPECIFIED ASTHMA, UNCOMPLICATED: ICD-10-CM

## 2022-10-25 DIAGNOSIS — C79.11 SECONDARY MALIGNANT NEOPLASM OF BLADDER: ICD-10-CM

## 2022-10-25 DIAGNOSIS — C18.7 MALIGNANT NEOPLASM OF SIGMOID COLON: ICD-10-CM

## 2022-10-25 DIAGNOSIS — N32.1 VESICOINTESTINAL FISTULA: ICD-10-CM

## 2022-10-25 DIAGNOSIS — K57.90 DIVERTICULOSIS OF INTESTINE, PART UNSPECIFIED, WITHOUT PERFORATION OR ABSCESS WITHOUT BLEEDING: ICD-10-CM

## 2022-10-25 DIAGNOSIS — E78.5 HYPERLIPIDEMIA, UNSPECIFIED: ICD-10-CM

## 2022-10-25 DIAGNOSIS — N39.0 URINARY TRACT INFECTION, SITE NOT SPECIFIED: ICD-10-CM

## 2022-10-25 DIAGNOSIS — B95.2 ENTEROCOCCUS AS THE CAUSE OF DISEASES CLASSIFIED ELSEWHERE: ICD-10-CM

## 2022-10-25 DIAGNOSIS — N21.0 CALCULUS IN BLADDER: ICD-10-CM

## 2022-10-25 DIAGNOSIS — E43 UNSPECIFIED SEVERE PROTEIN-CALORIE MALNUTRITION: ICD-10-CM

## 2022-10-25 DIAGNOSIS — I10 ESSENTIAL (PRIMARY) HYPERTENSION: ICD-10-CM

## 2022-10-25 LAB
ANION GAP SERPL CALC-SCNC: 8 MMOL/L — SIGNIFICANT CHANGE UP (ref 5–17)
ANION GAP SERPL CALC-SCNC: 8 MMOL/L — SIGNIFICANT CHANGE UP (ref 5–17)
BUN SERPL-MCNC: 19 MG/DL — SIGNIFICANT CHANGE UP (ref 7–23)
BUN SERPL-MCNC: 20 MG/DL — SIGNIFICANT CHANGE UP (ref 7–23)
CALCIUM SERPL-MCNC: 8.8 MG/DL — SIGNIFICANT CHANGE UP (ref 8.4–10.5)
CALCIUM SERPL-MCNC: 9 MG/DL — SIGNIFICANT CHANGE UP (ref 8.4–10.5)
CHLORIDE SERPL-SCNC: 100 MMOL/L — SIGNIFICANT CHANGE UP (ref 96–108)
CHLORIDE SERPL-SCNC: 102 MMOL/L — SIGNIFICANT CHANGE UP (ref 96–108)
CO2 SERPL-SCNC: 26 MMOL/L — SIGNIFICANT CHANGE UP (ref 22–31)
CO2 SERPL-SCNC: 26 MMOL/L — SIGNIFICANT CHANGE UP (ref 22–31)
CREAT SERPL-MCNC: 0.72 MG/DL — SIGNIFICANT CHANGE UP (ref 0.5–1.3)
CREAT SERPL-MCNC: 0.88 MG/DL — SIGNIFICANT CHANGE UP (ref 0.5–1.3)
EGFR: 100 ML/MIN/1.73M2 — SIGNIFICANT CHANGE UP
EGFR: 94 ML/MIN/1.73M2 — SIGNIFICANT CHANGE UP
GLUCOSE BLDC GLUCOMTR-MCNC: 99 MG/DL — SIGNIFICANT CHANGE UP (ref 70–99)
GLUCOSE SERPL-MCNC: 108 MG/DL — HIGH (ref 70–99)
GLUCOSE SERPL-MCNC: 98 MG/DL — SIGNIFICANT CHANGE UP (ref 70–99)
HCT VFR BLD CALC: 29.1 % — LOW (ref 39–50)
HGB BLD-MCNC: 8.9 G/DL — LOW (ref 13–17)
MAGNESIUM SERPL-MCNC: 2 MG/DL — SIGNIFICANT CHANGE UP (ref 1.6–2.6)
MAGNESIUM SERPL-MCNC: 2.1 MG/DL — SIGNIFICANT CHANGE UP (ref 1.6–2.6)
MCHC RBC-ENTMCNC: 24.9 PG — LOW (ref 27–34)
MCHC RBC-ENTMCNC: 30.6 GM/DL — LOW (ref 32–36)
MCV RBC AUTO: 81.3 FL — SIGNIFICANT CHANGE UP (ref 80–100)
NRBC # BLD: 0 /100 WBCS — SIGNIFICANT CHANGE UP (ref 0–0)
PHOSPHATE SERPL-MCNC: 3 MG/DL — SIGNIFICANT CHANGE UP (ref 2.5–4.5)
PHOSPHATE SERPL-MCNC: 3.3 MG/DL — SIGNIFICANT CHANGE UP (ref 2.5–4.5)
PLATELET # BLD AUTO: 347 K/UL — SIGNIFICANT CHANGE UP (ref 150–400)
POTASSIUM SERPL-MCNC: 4.6 MMOL/L — SIGNIFICANT CHANGE UP (ref 3.5–5.3)
POTASSIUM SERPL-MCNC: 4.9 MMOL/L — SIGNIFICANT CHANGE UP (ref 3.5–5.3)
POTASSIUM SERPL-SCNC: 4.6 MMOL/L — SIGNIFICANT CHANGE UP (ref 3.5–5.3)
POTASSIUM SERPL-SCNC: 4.9 MMOL/L — SIGNIFICANT CHANGE UP (ref 3.5–5.3)
RBC # BLD: 3.58 M/UL — LOW (ref 4.2–5.8)
RBC # FLD: 19.6 % — HIGH (ref 10.3–14.5)
SODIUM SERPL-SCNC: 134 MMOL/L — LOW (ref 135–145)
SODIUM SERPL-SCNC: 136 MMOL/L — SIGNIFICANT CHANGE UP (ref 135–145)
TRIGL SERPL-MCNC: 88 MG/DL — SIGNIFICANT CHANGE UP
WBC # BLD: 12.2 K/UL — HIGH (ref 3.8–10.5)
WBC # FLD AUTO: 12.2 K/UL — HIGH (ref 3.8–10.5)

## 2022-10-25 PROCEDURE — 71045 X-RAY EXAM CHEST 1 VIEW: CPT | Mod: 26

## 2022-10-25 RX ORDER — ALBUTEROL 90 UG/1
90 AEROSOL, METERED ORAL EVERY 6 HOURS
Refills: 0 | Status: COMPLETED | OUTPATIENT
Start: 2022-10-25 | End: 2023-09-23

## 2022-10-25 RX ORDER — IPRATROPIUM/ALBUTEROL SULFATE 18-103MCG
3 AEROSOL WITH ADAPTER (GRAM) INHALATION EVERY 6 HOURS
Refills: 0 | Status: DISCONTINUED | OUTPATIENT
Start: 2022-10-25 | End: 2022-11-01

## 2022-10-25 RX ORDER — ACETAMINOPHEN 500 MG
1000 TABLET ORAL ONCE
Refills: 0 | Status: COMPLETED | OUTPATIENT
Start: 2022-10-25 | End: 2022-10-25

## 2022-10-25 RX ORDER — SODIUM CHLORIDE 9 MG/ML
1000 INJECTION, SOLUTION INTRAVENOUS
Refills: 0 | Status: DISCONTINUED | OUTPATIENT
Start: 2022-10-25 | End: 2022-10-26

## 2022-10-25 RX ORDER — ALBUTEROL 90 UG/1
2 AEROSOL, METERED ORAL EVERY 6 HOURS
Refills: 0 | Status: DISCONTINUED | OUTPATIENT
Start: 2022-10-25 | End: 2022-11-01

## 2022-10-25 RX ADMIN — PANTOPRAZOLE SODIUM 40 MILLIGRAM(S): 20 TABLET, DELAYED RELEASE ORAL at 06:33

## 2022-10-25 RX ADMIN — Medication 3 MILLILITER(S): at 19:27

## 2022-10-25 RX ADMIN — HEPARIN SODIUM 5000 UNIT(S): 5000 INJECTION INTRAVENOUS; SUBCUTANEOUS at 23:06

## 2022-10-25 RX ADMIN — Medication 1000 MILLIGRAM(S): at 21:00

## 2022-10-25 RX ADMIN — HYDROMORPHONE HYDROCHLORIDE 0.5 MILLIGRAM(S): 2 INJECTION INTRAMUSCULAR; INTRAVENOUS; SUBCUTANEOUS at 05:30

## 2022-10-25 RX ADMIN — Medication 400 MILLIGRAM(S): at 20:45

## 2022-10-25 RX ADMIN — CHLORHEXIDINE GLUCONATE 1 APPLICATION(S): 213 SOLUTION TOPICAL at 06:33

## 2022-10-25 RX ADMIN — HYDROMORPHONE HYDROCHLORIDE 0.5 MILLIGRAM(S): 2 INJECTION INTRAMUSCULAR; INTRAVENOUS; SUBCUTANEOUS at 04:49

## 2022-10-25 RX ADMIN — SODIUM CHLORIDE 1000 MILLILITER(S): 9 INJECTION INTRAMUSCULAR; INTRAVENOUS; SUBCUTANEOUS at 00:02

## 2022-10-25 RX ADMIN — HEPARIN SODIUM 5000 UNIT(S): 5000 INJECTION INTRAVENOUS; SUBCUTANEOUS at 06:33

## 2022-10-25 RX ADMIN — HYDROMORPHONE HYDROCHLORIDE 0.5 MILLIGRAM(S): 2 INJECTION INTRAMUSCULAR; INTRAVENOUS; SUBCUTANEOUS at 12:31

## 2022-10-25 RX ADMIN — SODIUM CHLORIDE 120 MILLILITER(S): 9 INJECTION, SOLUTION INTRAVENOUS at 16:03

## 2022-10-25 RX ADMIN — HYDROMORPHONE HYDROCHLORIDE 0.5 MILLIGRAM(S): 2 INJECTION INTRAMUSCULAR; INTRAVENOUS; SUBCUTANEOUS at 13:01

## 2022-10-25 RX ADMIN — HEPARIN SODIUM 5000 UNIT(S): 5000 INJECTION INTRAVENOUS; SUBCUTANEOUS at 16:01

## 2022-10-25 NOTE — CHART NOTE - NSCHARTNOTEFT_GEN_A_CORE
Admitting Diagnosis:   Patient is a 67y old  Male who presents with a chief complaint of SBO (25 Oct 2022 09:22)      PAST MEDICAL & SURGICAL HISTORY:  Malignant neoplasm of hepatic flexure      Intestinal obstruction      Hypertension      High cholesterol      History of asthma      Vesicointestinal fistula      History of diverticulitis      Cystitis      H/O ascites      H/O left inguinal hernia repair      S/P right hemicolectomy  colon cancer      S/P exploratory laparotomy      Enterovesical fistula  closure with cystotomy      S/P colon resection      S/P ureteral stent placement          Current Nutrition Order:   NPO    PO Intake: Good (%) [   ]  Fair (50-75%) [   ] Poor (<25%) [   ] -- N/A    GI Issues: Denies n/v/d/c. Reports no BM. No stool/air in ostomy bag  Abd discomfort noted    Pain: Denies pain    Skin Integrity: David 17  No PU or edema noted    Labs:   10-25    136  |  102  |  20  ----------------------------<  98  4.6   |  26  |  0.72    Ca    9.0      25 Oct 2022 05:30  Phos  3.0     10-25  Mg     2.0     10-25      CAPILLARY BLOOD GLUCOSE      POCT Blood Glucose.: 99 mg/dL (25 Oct 2022 06:24)      Medications:  MEDICATIONS  (STANDING):  chlorhexidine 2% Cloths 1 Application(s) Topical <User Schedule>  dextrose 5% + sodium chloride 0.45%. 1000 milliLiter(s) (120 mL/Hr) IV Continuous <Continuous>  heparin   Injectable 5000 Unit(s) SubCutaneous every 8 hours  influenza  Vaccine (HIGH DOSE) 0.7 milliLiter(s) IntraMuscular once  pantoprazole  Injectable 40 milliGRAM(s) IV Push every 24 hours    MEDICATIONS  (PRN):  acetaminophen     Tablet .. 650 milliGRAM(s) Oral every 6 hours PRN Mild Pain (1 - 3), Moderate Pain (4 - 6)  HYDROmorphone  Injectable 0.5 milliGRAM(s) IV Push every 4 hours PRN Moderate Pain (4 - 6)  HYDROmorphone  Injectable 1 milliGRAM(s) IV Push every 6 hours PRN Severe Pain (7 - 10)  ondansetron Injectable 4 milliGRAM(s) IV Push every 6 hours PRN Nausea  sodium chloride 0.9% lock flush 10 milliLiter(s) IV Push every 1 hour PRN Pre/post blood products, medications, blood draw, and to maintain line patency    Adm Anthropometrics:  Height for BMI (FEET)	5 Feet  Height for BMI (INCHES)	8 Inch(s)  Height for BMI (CENTIMETERS)	172.72 Centimeter(s)  Weight for BMI (lbs)	150 lb  Weight for BMI (kg)	68 kg  Body Mass Index	22.7    Weight Change:  10/23 68 kg/149.9 lbs      Nutrition Focused Physical Exam: Completed [  x ]  Not Pertinent [   ]  please see malnutrition chart note. Documented on 10/20    Estimated energy needs:  Estimated needs based on ABW as pt's weight is % IBW (97%). Needs adjusted for advanced age & malnutrition.   Adjust fluids as needed per team.  2810-7634 kcals (30-35 kcals/kg, CBW)  88.4-102 g protein (1.3-1.5 g/kg, CBW)  1627-9745 mls (1.3-1.5 mls/kg, CBW)    Subjective:  67 yr old M PMH HTN, HLD, asthma, colon cancer s/p laparoscopic right colectomy (2016), chronic diverticulitis with known colovesicular fistula since July 2022, recently admitted (9/19-26) w/ chronic cystitis and bacteremia treated with IV abx, now s/p elective laparoscopic resection of colovesicular fistula, cystotomy closure, and bilateral ureteral stent placement 10/06/2022. Was discharged and now readmitted 10/18 for high grade SBO found on outpatient imaging with concerns of pelvic collection. NGT placed for suction 10/19. PICC line placed 10/20, plan to hold off on TPN. Still pending return of bowel function.    Pt seen resting in bed. He denies any pain, has abd discomfort. No n/v/d, still no BMs, no stools/flatus on ostomy bag. He remains NPO with NGT, output 250 cc x 24hrs. Discussed team in regards to nutrition plan, rec TPN to meet nutrition needs if delayed start of PO as pt meets criteria for malnutrition. TPN recs provided. Discussed with pt plan to start TPN. Explained importance, pt receptive. RD to follow.    Previous Nutrition Diagnosis:  Severe protein calorie malnutrition  RT diminished intake secondary to anorexia & abd pain  AEB 0% intake >5 days, 12% weight loss in 1 month, & moderate & severe wasting    Active [  x ]  Resolved [   ]    If resolved, new PES:     Goal: Consistently meet >75% est needs during hospital stay via most feasible route    Recommendations:  1. NPO, pending ability to adv diet with ROBF  2. Rec start on TPN, with goals: 340 g dex, 100 g AA , 50 g lipids to provide 2056 kcal, GIR 3.5, (1.5 g/kg)  >>Start at 150 g dex on day 1, 250 g dex on day 2, & advance to goal of 340 g dex on day 3  >>Fluids & electrolytes per team   >>Monitor triglycerides & LFTs weekly, Mg/K/Phos daily, & POC BG q6hrs  >>Trend daily weights  3. Pain & bowel regimen per team   4. Diet edu prn    Education: Discussed purpose of alternate nutrition at this time    Risk Level: High [ x  ] Moderate [   ] Low [   ]

## 2022-10-25 NOTE — PROGRESS NOTE ADULT - SUBJECTIVE AND OBJECTIVE BOX
UROLOGY PROGRESS NOTE    SUBJECTIVE: Patient seen and examined bedside. Patient denies fevers/chills, HA/dizziness, CP/SOB. Mildly nauseous, tolerating yepez. NGT in place. Wants to eat    heparin   Injectable 5000 Unit(s) SubCutaneous every 8 hours      Vital Signs Last 24 Hrs  T(C): 37.3 (25 Oct 2022 04:43), Max: 37.3 (25 Oct 2022 04:43)  T(F): 99.2 (25 Oct 2022 04:43), Max: 99.2 (25 Oct 2022 04:43)  HR: 84 (25 Oct 2022 04:43) (84 - 107)  BP: 134/68 (25 Oct 2022 04:43) (118/75 - 139/75)  BP(mean): --  RR: 18 (25 Oct 2022 04:43) (16 - 18)  SpO2: 94% (25 Oct 2022 04:43) (92% - 95%)    Parameters below as of 25 Oct 2022 04:43  Patient On (Oxygen Delivery Method): room air      I&O's Detail    24 Oct 2022 07:01  -  25 Oct 2022 07:00  --------------------------------------------------------  IN:    IV PiggyBack: 50 mL    Lactated Ringers: 2100 mL    Lactated Ringers Bolus: 1500 mL    Sodium Chloride 0.9% Bolus: 2000 mL  Total IN: 5650 mL    OUT:    Bulb (mL): 40 mL    Ileostomy (mL): 0 mL    Indwelling Catheter - Urethral (mL): 600 mL    Nasogastric/Oral tube (mL): 600 mL  Total OUT: 1240 mL    Total NET: 4410 mL          PHYSICAL EXAM    General: NAD, resting comfortably in bed  C/V: NSR  Pulm: Nonlabored breathing, no respiratory distress on room air  Abd: soft, mildly distended, midline wound WTD clean/dry/intact, ROSA SS output, stoma pink no output in bag  : yepez draining clear susan urine  Extrem: WWP, no edema, SCDs in place        LABS:                        8.9    12.20 )-----------( 347      ( 25 Oct 2022 05:30 )             29.1     10-25    136  |  102  |  20  ----------------------------<  98  4.6   |  26  |  0.72    Ca    9.0      25 Oct 2022 05:30  Phos  3.0     10-25  Mg     2.0     10-25      PT/INR - ( 23 Oct 2022 21:00 )   PT: 11.6 sec;   INR: 0.98          PTT - ( 23 Oct 2022 21:00 )  PTT:29.0 sec      CULTURES:        Culture - Urine (collected 10-19-22 @ 10:59)  Source: Catheterized Catheterized  Final Report (10-20-22 @ 14:43):    >100,000 CFU/ml Candida nufyr Also known as Kluyveromyces marxianus    >100,000 CFU/ml Clavibacter species ( lusitaniae)        RADIOLOGY & ADDITIONAL STUDIES:

## 2022-10-25 NOTE — PROGRESS NOTE ADULT - SUBJECTIVE AND OBJECTIVE BOX
AVSS  UO improving  sounds congested  Sats in low 90's  Abd soft and flat  incision clean  stoma pink  Ngt draining    OOB  pulmonary toilet  Resp therapy  NPNGT  IVAB

## 2022-10-25 NOTE — PROGRESS NOTE ADULT - ASSESSMENT
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 Was discharged and now readmitted 10/18 for high grade SBO found on outpatient imaging with concerns of pelvic collection s/p aspiration by IR 10/19, now pending Cx. Pending return of bowel function. ROSA Cr 10/22 22.17, 10/22 2.54 s/p SBR 10/24    Recommendations:  - Keep Yepez catheter while inpatient - do not remove even given UCx results  - NPO/NGT/diet per general surgery  - if becomes febrile, would treat UCx and collection Cx.  - discussed with attending  - no acute  interventions at this time  - please keep ROSA drain for now  - f/u with Dr. Torre 10/31, my consider ct cystogram in patient to assess for removal of yepez before discharge    CHAN Paz MD (PGY-2)  Consult Urology Resident  Please feel free to reach out on Teams Chat

## 2022-10-25 NOTE — PROGRESS NOTE ADULT - SUBJECTIVE AND OBJECTIVE BOX
SUBJECTIVE: Pt seen and examined at bedside with chief. Pt denies any complaints. Pain well controlled. Has been NPO with NGT, denies N/V. Denies any stool/air in ostomy bag.    MEDICATIONS  (STANDING):  chlorhexidine 2% Cloths 1 Application(s) Topical <User Schedule>  dextrose 5% + sodium chloride 0.45%. 1000 milliLiter(s) (120 mL/Hr) IV Continuous <Continuous>  heparin   Injectable 5000 Unit(s) SubCutaneous every 8 hours  influenza  Vaccine (HIGH DOSE) 0.7 milliLiter(s) IntraMuscular once  pantoprazole  Injectable 40 milliGRAM(s) IV Push every 24 hours    MEDICATIONS  (PRN):  acetaminophen     Tablet .. 650 milliGRAM(s) Oral every 6 hours PRN Mild Pain (1 - 3), Moderate Pain (4 - 6)  HYDROmorphone  Injectable 0.5 milliGRAM(s) IV Push every 4 hours PRN Moderate Pain (4 - 6)  HYDROmorphone  Injectable 1 milliGRAM(s) IV Push every 6 hours PRN Severe Pain (7 - 10)  ondansetron Injectable 4 milliGRAM(s) IV Push every 6 hours PRN Nausea  sodium chloride 0.9% lock flush 10 milliLiter(s) IV Push every 1 hour PRN Pre/post blood products, medications, blood draw, and to maintain line patency      Vital Signs Last 24 Hrs  T(C): 37.3 (25 Oct 2022 04:43), Max: 37.3 (25 Oct 2022 04:43)  T(F): 99.2 (25 Oct 2022 04:43), Max: 99.2 (25 Oct 2022 04:43)  HR: 84 (25 Oct 2022 04:43) (84 - 107)  BP: 134/68 (25 Oct 2022 04:43) (118/75 - 139/75)  BP(mean): --  RR: 18 (25 Oct 2022 04:43) (16 - 18)  SpO2: 94% (25 Oct 2022 04:43) (92% - 96%)    Parameters below as of 25 Oct 2022 04:43  Patient On (Oxygen Delivery Method): room air        PHYSICAL EXAM:      Constitutional: A&Ox3    Respiratory: non labored breathing, no respiratory distress    Cardiovascular: NSR, RRR    Gastrointestinal: Soft mildly distended. non tender. midline wound healing well with WTD dressings in place. ROSA with SS output. stoma pink and patent without air/stool in bag.    Genitourinary: yepez to gravity    Extremities: (-) edema                  I&O's Detail    24 Oct 2022 07:01  -  25 Oct 2022 07:00  --------------------------------------------------------  IN:    IV PiggyBack: 50 mL    Lactated Ringers: 2100 mL    Lactated Ringers Bolus: 1500 mL    Sodium Chloride 0.9% Bolus: 2000 mL  Total IN: 5650 mL    OUT:    Bulb (mL): 40 mL    Ileostomy (mL): 0 mL    Indwelling Catheter - Urethral (mL): 600 mL    Nasogastric/Oral tube (mL): 600 mL  Total OUT: 1240 mL    Total NET: 4410 mL          LABS:                        11.4   17.42 )-----------( 494      ( 24 Oct 2022 05:30 )             36.2     10-24    134<L>  |  100  |  19  ----------------------------<  108<H>  4.9   |  26  |  0.88    Ca    8.8      24 Oct 2022 23:57  Phos  3.3     10-24  Mg     2.1     10-24      PT/INR - ( 23 Oct 2022 21:00 )   PT: 11.6 sec;   INR: 0.98          PTT - ( 23 Oct 2022 21:00 )  PTT:29.0 sec      RADIOLOGY & ADDITIONAL STUDIES:

## 2022-10-26 LAB
ANION GAP SERPL CALC-SCNC: 8 MMOL/L — SIGNIFICANT CHANGE UP (ref 5–17)
BUN SERPL-MCNC: 15 MG/DL — SIGNIFICANT CHANGE UP (ref 7–23)
CALCIUM SERPL-MCNC: 9.2 MG/DL — SIGNIFICANT CHANGE UP (ref 8.4–10.5)
CHLORIDE SERPL-SCNC: 105 MMOL/L — SIGNIFICANT CHANGE UP (ref 96–108)
CO2 SERPL-SCNC: 25 MMOL/L — SIGNIFICANT CHANGE UP (ref 22–31)
CREAT SERPL-MCNC: 0.52 MG/DL — SIGNIFICANT CHANGE UP (ref 0.5–1.3)
EGFR: 110 ML/MIN/1.73M2 — SIGNIFICANT CHANGE UP
GLUCOSE SERPL-MCNC: 97 MG/DL — SIGNIFICANT CHANGE UP (ref 70–99)
HCT VFR BLD CALC: 25.5 % — LOW (ref 39–50)
HGB BLD-MCNC: 7.9 G/DL — LOW (ref 13–17)
MAGNESIUM SERPL-MCNC: 2.1 MG/DL — SIGNIFICANT CHANGE UP (ref 1.6–2.6)
MCHC RBC-ENTMCNC: 25.5 PG — LOW (ref 27–34)
MCHC RBC-ENTMCNC: 31 GM/DL — LOW (ref 32–36)
MCV RBC AUTO: 82.3 FL — SIGNIFICANT CHANGE UP (ref 80–100)
NRBC # BLD: 0 /100 WBCS — SIGNIFICANT CHANGE UP (ref 0–0)
PHOSPHATE SERPL-MCNC: 2.6 MG/DL — SIGNIFICANT CHANGE UP (ref 2.5–4.5)
PLATELET # BLD AUTO: 239 K/UL — SIGNIFICANT CHANGE UP (ref 150–400)
POTASSIUM SERPL-MCNC: 4.3 MMOL/L — SIGNIFICANT CHANGE UP (ref 3.5–5.3)
POTASSIUM SERPL-SCNC: 4.3 MMOL/L — SIGNIFICANT CHANGE UP (ref 3.5–5.3)
RBC # BLD: 3.1 M/UL — LOW (ref 4.2–5.8)
RBC # FLD: 19.5 % — HIGH (ref 10.3–14.5)
SODIUM SERPL-SCNC: 138 MMOL/L — SIGNIFICANT CHANGE UP (ref 135–145)
WBC # BLD: 9.54 K/UL — SIGNIFICANT CHANGE UP (ref 3.8–10.5)
WBC # FLD AUTO: 9.54 K/UL — SIGNIFICANT CHANGE UP (ref 3.8–10.5)

## 2022-10-26 RX ORDER — BENZOCAINE AND MENTHOL 5; 1 G/100ML; G/100ML
1 LIQUID ORAL
Refills: 0 | Status: DISCONTINUED | OUTPATIENT
Start: 2022-10-26 | End: 2022-11-01

## 2022-10-26 RX ORDER — ELECTROLYTE SOLUTION,INJ
1 VIAL (ML) INTRAVENOUS
Refills: 0 | Status: DISCONTINUED | OUTPATIENT
Start: 2022-10-26 | End: 2022-10-26

## 2022-10-26 RX ORDER — POTASSIUM PHOSPHATE, MONOBASIC POTASSIUM PHOSPHATE, DIBASIC 236; 224 MG/ML; MG/ML
15 INJECTION, SOLUTION INTRAVENOUS ONCE
Refills: 0 | Status: COMPLETED | OUTPATIENT
Start: 2022-10-26 | End: 2022-10-26

## 2022-10-26 RX ORDER — I.V. FAT EMULSION 20 G/100ML
0.74 EMULSION INTRAVENOUS
Qty: 50 | Refills: 0 | Status: DISCONTINUED | OUTPATIENT
Start: 2022-10-26 | End: 2022-10-26

## 2022-10-26 RX ADMIN — POTASSIUM PHOSPHATE, MONOBASIC POTASSIUM PHOSPHATE, DIBASIC 62.5 MILLIMOLE(S): 236; 224 INJECTION, SOLUTION INTRAVENOUS at 14:55

## 2022-10-26 RX ADMIN — PANTOPRAZOLE SODIUM 40 MILLIGRAM(S): 20 TABLET, DELAYED RELEASE ORAL at 06:24

## 2022-10-26 RX ADMIN — Medication 3 MILLILITER(S): at 06:24

## 2022-10-26 RX ADMIN — HEPARIN SODIUM 5000 UNIT(S): 5000 INJECTION INTRAVENOUS; SUBCUTANEOUS at 06:24

## 2022-10-26 RX ADMIN — SODIUM CHLORIDE 75 MILLILITER(S): 9 INJECTION, SOLUTION INTRAVENOUS at 06:28

## 2022-10-26 RX ADMIN — BENZOCAINE AND MENTHOL 1 LOZENGE: 5; 1 LIQUID ORAL at 11:09

## 2022-10-26 RX ADMIN — HYDROMORPHONE HYDROCHLORIDE 0.5 MILLIGRAM(S): 2 INJECTION INTRAMUSCULAR; INTRAVENOUS; SUBCUTANEOUS at 23:09

## 2022-10-26 RX ADMIN — Medication 3 MILLILITER(S): at 18:28

## 2022-10-26 RX ADMIN — I.V. FAT EMULSION 20.83 GM/KG/DAY: 20 EMULSION INTRAVENOUS at 18:29

## 2022-10-26 RX ADMIN — HEPARIN SODIUM 5000 UNIT(S): 5000 INJECTION INTRAVENOUS; SUBCUTANEOUS at 22:27

## 2022-10-26 RX ADMIN — CHLORHEXIDINE GLUCONATE 1 APPLICATION(S): 213 SOLUTION TOPICAL at 07:10

## 2022-10-26 RX ADMIN — HEPARIN SODIUM 5000 UNIT(S): 5000 INJECTION INTRAVENOUS; SUBCUTANEOUS at 14:10

## 2022-10-26 RX ADMIN — Medication 1 EACH: at 18:26

## 2022-10-26 RX ADMIN — I.V. FAT EMULSION 20.83 GM/KG/DAY: 20 EMULSION INTRAVENOUS at 18:23

## 2022-10-26 RX ADMIN — Medication 3 MILLILITER(S): at 22:27

## 2022-10-26 RX ADMIN — Medication 3 MILLILITER(S): at 13:10

## 2022-10-26 RX ADMIN — HYDROMORPHONE HYDROCHLORIDE 0.5 MILLIGRAM(S): 2 INJECTION INTRAMUSCULAR; INTRAVENOUS; SUBCUTANEOUS at 22:39

## 2022-10-26 RX ADMIN — Medication 3 MILLILITER(S): at 01:31

## 2022-10-26 NOTE — PROGRESS NOTE ADULT - SUBJECTIVE AND OBJECTIVE BOX
INTERVAL HPI/OVERNIGHT EVENTS: home ipratropium and albuterol added, breathing improved 93-94% on ra, cxr performed, Dr. Zhang aware, 22:00 satting 97% on ra     STATUS POST:    10/19: unsuccessful IR drainage  10/20: PICC line  10/24: exploratory laparotomy, SBR, partial omentectomy     SUBJECTIVE: Pt seen and examined at bedside this am by surgery team. No acute complaints. Overall pain well controlled. Ostomy w/ more function. Denies f/n/v/cp/sob.    MEDICATIONS  (STANDING):  albuterol/ipratropium for Nebulization 3 milliLiter(s) Nebulizer every 6 hours  chlorhexidine 2% Cloths 1 Application(s) Topical <User Schedule>  dextrose 5% + sodium chloride 0.45%. 1000 milliLiter(s) (75 mL/Hr) IV Continuous <Continuous>  heparin   Injectable 5000 Unit(s) SubCutaneous every 8 hours  influenza  Vaccine (HIGH DOSE) 0.7 milliLiter(s) IntraMuscular once  pantoprazole  Injectable 40 milliGRAM(s) IV Push every 24 hours    MEDICATIONS  (PRN):  acetaminophen     Tablet .. 650 milliGRAM(s) Oral every 6 hours PRN Mild Pain (1 - 3), Moderate Pain (4 - 6)  ALBUTerol    90 MICROgram(s) HFA Inhaler 2 Puff(s) Inhalation every 6 hours PRN Shortness of Breath and/or Wheezing  HYDROmorphone  Injectable 0.5 milliGRAM(s) IV Push every 4 hours PRN Moderate Pain (4 - 6)  HYDROmorphone  Injectable 1 milliGRAM(s) IV Push every 6 hours PRN Severe Pain (7 - 10)  ondansetron Injectable 4 milliGRAM(s) IV Push every 6 hours PRN Nausea  sodium chloride 0.9% lock flush 10 milliLiter(s) IV Push every 1 hour PRN Pre/post blood products, medications, blood draw, and to maintain line patency    Vital Signs Last 24 Hrs  T(C): 36.6 (26 Oct 2022 05:12), Max: 37.2 (25 Oct 2022 08:45)  T(F): 97.9 (26 Oct 2022 05:12), Max: 98.9 (25 Oct 2022 08:45)  HR: 80 (26 Oct 2022 05:12) (80 - 109)  BP: 122/72 (26 Oct 2022 05:12) (114/65 - 136/70)  BP(mean): --  RR: 18 (26 Oct 2022 05:12) (17 - 18)  SpO2: 100% (26 Oct 2022 05:12) (92% - 100%)    Parameters below as of 26 Oct 2022 05:12  Patient On (Oxygen Delivery Method): nasal cannula  O2 Flow (L/min): 2    PHYSICAL EXAM:    Constitutional: A&Ox3, NAD. NGT in place     Respiratory: non labored breathing, no respiratory distress    Cardiovascular: NSR, RRR    Gastrointestinal: abdomen soft, nd, appropriately ttp to surgical site. Dressings c/d/i. Ostomy w/ stoma pink and viable, gas and soft dark stool in bag. ROSA x 1.    Genitourinary: Degroot in place     Extremities: wwp, no calf tenderness or edema. SCDs in place       I&O's Detail    25 Oct 2022 07:01  -  26 Oct 2022 07:00  --------------------------------------------------------  IN:    dextrose 5% + sodium chloride 0.45%: 1785 mL  Total IN: 1785 mL    OUT:    Bulb (mL): 5 mL    Ileostomy (mL): 0 mL    Indwelling Catheter - Urethral (mL): 1425 mL    Nasogastric/Oral tube (mL): 1400 mL  Total OUT: 2830 mL    Total NET: -1045 mL          LABS:                        8.9    12.20 )-----------( 347      ( 25 Oct 2022 05:30 )             29.1     10-25    136  |  102  |  20  ----------------------------<  98  4.6   |  26  |  0.72    Ca    9.0      25 Oct 2022 05:30  Phos  3.0     10-25  Mg     2.0     10-25            RADIOLOGY & ADDITIONAL STUDIES:

## 2022-10-26 NOTE — PROGRESS NOTE ADULT - SUBJECTIVE AND OBJECTIVE BOX
o2 sats much improved  AVSS  Abd soft  stoma with output    woundcare  D/C Ngt  Npo   lozenges  ice chips  OOB  PT

## 2022-10-26 NOTE — PROGRESS NOTE ADULT - ASSESSMENT
67 M with PMH HTN, HLD, asthma, diverticulitis with colovesicular fistula and colon cancer s/p R hemicolectomy (2016) and now s/p exploratory laparotomy, segmental colon resection, resection of colovesicular fistula, partial cystectomy, bladder diverticulectomy, colorectal anastomosis, rigid sigmoidoscopy, loop ileostomy creation (10/06/22) complicated by a bladder leak (elevated Cr in ROSA) presenting with decreased ileostomy output and feeling bloated. Sent into the ED after findings of obstruction on CT scan. In th ED afeb, VSS, WBC 20. CT scan with SBO and complex fluid collection within the anterior pelvis measuring 10 by 5.6 cm now s/p exploratory laparotomy, SBR, partial omentectomy (10/24).    NPO/IVF  NGT to LIWS, will remove today  PICC, start TPN today  Pain/Nausea control PRN   SCDs/HSQ  IS/OOBA  JPx1  Degroot  F/u urology recs   F/u ID recs

## 2022-10-27 LAB
ANION GAP SERPL CALC-SCNC: 8 MMOL/L — SIGNIFICANT CHANGE UP (ref 5–17)
BILIRUB DIRECT SERPL-MCNC: 0.2 MG/DL — SIGNIFICANT CHANGE UP (ref 0–0.3)
BUN SERPL-MCNC: 15 MG/DL — SIGNIFICANT CHANGE UP (ref 7–23)
CALCIUM SERPL-MCNC: 9.2 MG/DL — SIGNIFICANT CHANGE UP (ref 8.4–10.5)
CHLORIDE SERPL-SCNC: 102 MMOL/L — SIGNIFICANT CHANGE UP (ref 96–108)
CO2 SERPL-SCNC: 28 MMOL/L — SIGNIFICANT CHANGE UP (ref 22–31)
CREAT SERPL-MCNC: 0.5 MG/DL — SIGNIFICANT CHANGE UP (ref 0.5–1.3)
EGFR: 111 ML/MIN/1.73M2 — SIGNIFICANT CHANGE UP
GLUCOSE SERPL-MCNC: 140 MG/DL — HIGH (ref 70–99)
HCT VFR BLD CALC: 22.4 % — LOW (ref 39–50)
HCT VFR BLD CALC: 22.8 % — LOW (ref 39–50)
HGB BLD-MCNC: 7.1 G/DL — LOW (ref 13–17)
HGB BLD-MCNC: 7.2 G/DL — LOW (ref 13–17)
MAGNESIUM SERPL-MCNC: 2.1 MG/DL — SIGNIFICANT CHANGE UP (ref 1.6–2.6)
MCHC RBC-ENTMCNC: 25.4 PG — LOW (ref 27–34)
MCHC RBC-ENTMCNC: 25.7 PG — LOW (ref 27–34)
MCHC RBC-ENTMCNC: 31.1 GM/DL — LOW (ref 32–36)
MCHC RBC-ENTMCNC: 32.1 GM/DL — SIGNIFICANT CHANGE UP (ref 32–36)
MCV RBC AUTO: 80 FL — SIGNIFICANT CHANGE UP (ref 80–100)
MCV RBC AUTO: 81.4 FL — SIGNIFICANT CHANGE UP (ref 80–100)
NRBC # BLD: 0 /100 WBCS — SIGNIFICANT CHANGE UP (ref 0–0)
NRBC # BLD: 0 /100 WBCS — SIGNIFICANT CHANGE UP (ref 0–0)
PHOSPHATE SERPL-MCNC: 3 MG/DL — SIGNIFICANT CHANGE UP (ref 2.5–4.5)
PLATELET # BLD AUTO: 263 K/UL — SIGNIFICANT CHANGE UP (ref 150–400)
PLATELET # BLD AUTO: 272 K/UL — SIGNIFICANT CHANGE UP (ref 150–400)
POTASSIUM SERPL-MCNC: 3.8 MMOL/L — SIGNIFICANT CHANGE UP (ref 3.5–5.3)
POTASSIUM SERPL-SCNC: 3.8 MMOL/L — SIGNIFICANT CHANGE UP (ref 3.5–5.3)
RBC # BLD: 2.8 M/UL — LOW (ref 4.2–5.8)
RBC # BLD: 2.8 M/UL — LOW (ref 4.2–5.8)
RBC # FLD: 19 % — HIGH (ref 10.3–14.5)
RBC # FLD: 19 % — HIGH (ref 10.3–14.5)
SODIUM SERPL-SCNC: 138 MMOL/L — SIGNIFICANT CHANGE UP (ref 135–145)
SURGICAL PATHOLOGY STUDY: SIGNIFICANT CHANGE UP
TRIGL SERPL-MCNC: 119 MG/DL — SIGNIFICANT CHANGE UP
WBC # BLD: 6.2 K/UL — SIGNIFICANT CHANGE UP (ref 3.8–10.5)
WBC # BLD: 7.39 K/UL — SIGNIFICANT CHANGE UP (ref 3.8–10.5)
WBC # FLD AUTO: 6.2 K/UL — SIGNIFICANT CHANGE UP (ref 3.8–10.5)
WBC # FLD AUTO: 7.39 K/UL — SIGNIFICANT CHANGE UP (ref 3.8–10.5)

## 2022-10-27 PROCEDURE — 71045 X-RAY EXAM CHEST 1 VIEW: CPT | Mod: 26

## 2022-10-27 RX ORDER — I.V. FAT EMULSION 20 G/100ML
0.73 EMULSION INTRAVENOUS
Qty: 50 | Refills: 0 | Status: DISCONTINUED | OUTPATIENT
Start: 2022-10-27 | End: 2022-10-27

## 2022-10-27 RX ORDER — ELECTROLYTE SOLUTION,INJ
1 VIAL (ML) INTRAVENOUS
Refills: 0 | Status: DISCONTINUED | OUTPATIENT
Start: 2022-10-27 | End: 2022-10-27

## 2022-10-27 RX ORDER — POTASSIUM CHLORIDE 20 MEQ
10 PACKET (EA) ORAL ONCE
Refills: 0 | Status: COMPLETED | OUTPATIENT
Start: 2022-10-27 | End: 2022-10-27

## 2022-10-27 RX ADMIN — Medication 3 MILLILITER(S): at 11:07

## 2022-10-27 RX ADMIN — Medication 1 EACH: at 17:29

## 2022-10-27 RX ADMIN — Medication 3 MILLILITER(S): at 17:26

## 2022-10-27 RX ADMIN — HEPARIN SODIUM 5000 UNIT(S): 5000 INJECTION INTRAVENOUS; SUBCUTANEOUS at 06:27

## 2022-10-27 RX ADMIN — PANTOPRAZOLE SODIUM 40 MILLIGRAM(S): 20 TABLET, DELAYED RELEASE ORAL at 06:28

## 2022-10-27 RX ADMIN — Medication 3 MILLILITER(S): at 06:27

## 2022-10-27 RX ADMIN — I.V. FAT EMULSION 20.83 GM/KG/DAY: 20 EMULSION INTRAVENOUS at 17:29

## 2022-10-27 RX ADMIN — HEPARIN SODIUM 5000 UNIT(S): 5000 INJECTION INTRAVENOUS; SUBCUTANEOUS at 15:01

## 2022-10-27 RX ADMIN — Medication 100 MILLIEQUIVALENT(S): at 11:07

## 2022-10-27 RX ADMIN — CHLORHEXIDINE GLUCONATE 1 APPLICATION(S): 213 SOLUTION TOPICAL at 06:49

## 2022-10-27 RX ADMIN — HEPARIN SODIUM 5000 UNIT(S): 5000 INJECTION INTRAVENOUS; SUBCUTANEOUS at 22:13

## 2022-10-27 RX ADMIN — ALBUTEROL 2 PUFF(S): 90 AEROSOL, METERED ORAL at 17:26

## 2022-10-27 NOTE — PROGRESS NOTE ADULT - SUBJECTIVE AND OBJECTIVE BOX
UROLOGY PROGRESS NOTE    SUBJECTIVE: Patient seen and examined bedside. Patient denies fevers/chills, HA/dizziness, nausea/vomiting. cough improving. ngt removed, trying cld. tolerating yepez    heparin   Injectable 5000 Unit(s) SubCutaneous every 8 hours      Vital Signs Last 24 Hrs  T(C): 36.4 (27 Oct 2022 05:30), Max: 37.7 (26 Oct 2022 20:48)  T(F): 97.6 (27 Oct 2022 05:30), Max: 99.8 (26 Oct 2022 20:48)  HR: 67 (27 Oct 2022 05:30) (67 - 86)  BP: 111/63 (27 Oct 2022 05:30) (111/63 - 146/69)  BP(mean): --  RR: 18 (27 Oct 2022 05:30) (17 - 18)  SpO2: 98% (27 Oct 2022 05:30) (92% - 100%)    Parameters below as of 27 Oct 2022 05:30  Patient On (Oxygen Delivery Method): room air      I&O's Detail    26 Oct 2022 07:01  -  27 Oct 2022 07:00  --------------------------------------------------------  IN:    dextrose 5% + sodium chloride 0.45%: 600 mL    Fat Emulsion (Fish Oil &amp; Plant Based) 20% Infusion: 208 mL    IV PiggyBack: 62.5 mL    TPN (Total Parenteral Nutrition): 693 mL  Total IN: 1563.5 mL    OUT:    Bulb (mL): 5 mL    Ileostomy (mL): 675 mL    Indwelling Catheter - Urethral (mL): 1200 mL  Total OUT: 1880 mL    Total NET: -316.5 mL          PHYSICAL EXAM    General: NAD, resting comfortably in bed  C/V: NSR  Pulm: Nonlabored breathing, no respiratory distress on room air  Abd: soft, mildly distended, midline wound WTD clean/dry/intact, ROSA SS output, stoma pink with dark liquid in bag  : yepez draining clear yellow urine  Extrem: WWP, no edema, SCDs in place      LABS:                        7.1    7.39  )-----------( 272      ( 27 Oct 2022 07:55 )             22.8     10-27    138  |  102  |  15  ----------------------------<  140<H>  3.8   |  28  |  0.50    Ca    9.2      27 Oct 2022 07:55  Phos  3.0     10-27  Mg     2.1     10-27    TPro  x   /  Alb  x   /  TBili  x   /  DBili  0.2  /  AST  x   /  ALT  x   /  AlkPhos  x   10-27          CULTURES:          RADIOLOGY & ADDITIONAL STUDIES:

## 2022-10-27 NOTE — PROGRESS NOTE ADULT - SUBJECTIVE AND OBJECTIVE BOX
STATUS POST:  Exploratory laparotomy, SBR, partial omentectomy    POST OPERATIVE DAY #: 3    SUBJECTIVE:   Patient seen and examined at bedside by chief during AM rounds.   -N  -V  ost ok  ROSA   changed midline dressing  no fever chills      Vital Signs Last 24 Hrs  T(C): 36.7 (27 Oct 2022 08:59), Max: 37.7 (26 Oct 2022 20:48)  T(F): 98 (27 Oct 2022 08:59), Max: 99.8 (26 Oct 2022 20:48)  HR: 70 (27 Oct 2022 08:59) (67 - 86)  BP: 105/59 (27 Oct 2022 08:59) (105/59 - 146/69)  BP(mean): --  RR: 16 (27 Oct 2022 08:59) (16 - 18)  SpO2: 96% (27 Oct 2022 08:59) (92% - 100%)    Parameters below as of 27 Oct 2022 08:59  Patient On (Oxygen Delivery Method): room air        I&O's Summary    26 Oct 2022 07:01  -  27 Oct 2022 07:00  --------------------------------------------------------  IN: 1563.5 mL / OUT: 1880 mL / NET: -316.5 mL        Physical Exam:  General: Resting comfortably in bed, NAD  HEENT: ATNC  Pulmonary: Nonlabored breathing, no respiratory distress, no acessory muscle use noted  Cardiovascular: NSR  Abdomen: Soft, nondisteded, appropriate incisional tenderness  Extremities: WWP, SCDs in place, No significant edema appreciated    LABS:                        7.1    7.39  )-----------( 272      ( 27 Oct 2022 07:55 )             22.8     10-27    138  |  102  |  15  ----------------------------<  140<H>  3.8   |  28  |  0.50    Ca    9.2      27 Oct 2022 07:55  Phos  3.0     10-27  Mg     2.1     10-27    TPro  x   /  Alb  x   /  TBili  x   /  DBili  0.2  /  AST  x   /  ALT  x   /  AlkPhos  x   10-27          Plan:  -NPO/IVF - LR @ 100  -Antibiotics -  -SQH and SCDs  -OOB as tolerated/IS  -AM labs STATUS POST:  Exploratory laparotomy, SBR, partial omentectomy    POST OPERATIVE DAY #: 3    SUBJECTIVE:   Patient seen and examined at bedside by chief during AM rounds. Patient was noted to have another episode of desaturation to 88% yesterday that resolved with duonebs. patient reports today that abdominal pain is improving. Denies nausea or vomiting. Denies chest pain or shortness of breath. Denies fevers, chills.        Vital Signs Last 24 Hrs  T(C): 36.7 (27 Oct 2022 08:59), Max: 37.7 (26 Oct 2022 20:48)  T(F): 98 (27 Oct 2022 08:59), Max: 99.8 (26 Oct 2022 20:48)  HR: 70 (27 Oct 2022 08:59) (67 - 86)  BP: 105/59 (27 Oct 2022 08:59) (105/59 - 146/69)  BP(mean): --  RR: 16 (27 Oct 2022 08:59) (16 - 18)  SpO2: 96% (27 Oct 2022 08:59) (92% - 100%)    Parameters below as of 27 Oct 2022 08:59  Patient On (Oxygen Delivery Method): room air        I&O's Summary    26 Oct 2022 07:01  -  27 Oct 2022 07:00  --------------------------------------------------------  IN: 1563.5 mL / OUT: 1880 mL / NET: -316.5 mL        Physical Exam:  General: Resting comfortably in bed, NAD  HEENT: ATNC  Pulmonary: Nonlabored breathing, no respiratory distress, no accessory muscle use noted  Cardiovascular: NSR  Abdomen: Soft, mildly distended, minimal tenderness to palpation, no rebound or guarding. Midline incision clean and packing/dressing replaced at bedside. ROSA noted with serous output. Ostomy pink and patent with stool and gas.  Extremities: WWP, SCDs in place, No significant edema appreciated    LABS:                        7.1    7.39  )-----------( 272      ( 27 Oct 2022 07:55 )             22.8     10-27    138  |  102  |  15  ----------------------------<  140<H>  3.8   |  28  |  0.50    Ca    9.2      27 Oct 2022 07:55  Phos  3.0     10-27  Mg     2.1     10-27    TPro  x   /  Alb  x   /  TBili  x   /  DBili  0.2  /  AST  x   /  ALT  x   /  AlkPhos  x   10-27

## 2022-10-27 NOTE — PROGRESS NOTE ADULT - ASSESSMENT
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 Was discharged and now readmitted 10/18 for high grade SBO found on outpatient imaging with concerns of pelvic collection s/p aspiration by IR 10/19, now pending Cx. Pending return of bowel function. ROSA Cr 10/22 22.17, 10/22 2.54 s/p SBR 10/24, NGT removed 10/26    Recommendations:  - Keep Degroot catheter while inpatient - do not remove even given UCx results  - diet per general surgery  - if becomes febrile, would treat UCx and collection Cx.  - discussed with attending  - no acute  interventions at this time  - please check a recent ROSA Cr  - if staying until 10/31, CT cystogram inpatient  - if dc before then, f/u with Dr. Easley 10/31 for in office cystogram    CHAN Paz MD (PGY-2)  Consult Urology Resident  Please feel free to reach out on Teams Chat

## 2022-10-27 NOTE — PROGRESS NOTE ADULT - ASSESSMENT
plan:  [facility]  CBC @ 12   continue TPN  Urology recs  67 M with PMH HTN, HLD, asthma, diverticulitis with colovesicular fistula and colon cancer s/p R hemicolectomy (2016) and now s/p exploratory laparotomy, segmental colon resection, resection of colovesicular fistula, partial cystectomy, bladder diverticulectomy, colorectal anastomosis, rigid sigmoidoscopy, loop ileostomy creation (10/06/22) complicated by a bladder leak (elevated Cr in ROSA) presenting with decreased ileostomy output and feeling bloated. Sent into the ED after findings of obstruction on CT scan. In th ED afeb, VSS, WBC 20. CT scan with SBO and complex fluid collection within the anterior pelvis measuring 10 by 5.6 cm now s/p exploratory laparotomy, SBR, partial omentectomy (10/24). POD3 and doing well clinically. Breathing improved since last night and pain improving from prior    Advance to [facility]D  IVF  PICC, continue TPN today  Pain/Nausea control PRN   Repeat CBC at 12 noon - will followup  SCDs/HSQ  IS/OOBA  CXR  JPx1  Keep Degroot  Appreciate urology recs   F/u ID recs

## 2022-10-28 LAB
ANION GAP SERPL CALC-SCNC: 7 MMOL/L — SIGNIFICANT CHANGE UP (ref 5–17)
BUN SERPL-MCNC: 13 MG/DL — SIGNIFICANT CHANGE UP (ref 7–23)
CALCIUM SERPL-MCNC: 9.5 MG/DL — SIGNIFICANT CHANGE UP (ref 8.4–10.5)
CHLORIDE SERPL-SCNC: 101 MMOL/L — SIGNIFICANT CHANGE UP (ref 96–108)
CO2 SERPL-SCNC: 29 MMOL/L — SIGNIFICANT CHANGE UP (ref 22–31)
CREAT SERPL-MCNC: 0.44 MG/DL — LOW (ref 0.5–1.3)
EGFR: 115 ML/MIN/1.73M2 — SIGNIFICANT CHANGE UP
GLUCOSE SERPL-MCNC: 155 MG/DL — HIGH (ref 70–99)
HCT VFR BLD CALC: 24.5 % — LOW (ref 39–50)
HGB BLD-MCNC: 7.6 G/DL — LOW (ref 13–17)
MAGNESIUM SERPL-MCNC: 2.1 MG/DL — SIGNIFICANT CHANGE UP (ref 1.6–2.6)
MCHC RBC-ENTMCNC: 25.2 PG — LOW (ref 27–34)
MCHC RBC-ENTMCNC: 31 GM/DL — LOW (ref 32–36)
MCV RBC AUTO: 81.1 FL — SIGNIFICANT CHANGE UP (ref 80–100)
NRBC # BLD: 0 /100 WBCS — SIGNIFICANT CHANGE UP (ref 0–0)
PHOSPHATE SERPL-MCNC: 3.3 MG/DL — SIGNIFICANT CHANGE UP (ref 2.5–4.5)
PLATELET # BLD AUTO: 302 K/UL — SIGNIFICANT CHANGE UP (ref 150–400)
POTASSIUM SERPL-MCNC: 4.4 MMOL/L — SIGNIFICANT CHANGE UP (ref 3.5–5.3)
POTASSIUM SERPL-SCNC: 4.4 MMOL/L — SIGNIFICANT CHANGE UP (ref 3.5–5.3)
RBC # BLD: 3.02 M/UL — LOW (ref 4.2–5.8)
RBC # FLD: 18.7 % — HIGH (ref 10.3–14.5)
SODIUM SERPL-SCNC: 137 MMOL/L — SIGNIFICANT CHANGE UP (ref 135–145)
WBC # BLD: 6.5 K/UL — SIGNIFICANT CHANGE UP (ref 3.8–10.5)
WBC # FLD AUTO: 6.5 K/UL — SIGNIFICANT CHANGE UP (ref 3.8–10.5)

## 2022-10-28 RX ORDER — ELECTROLYTE SOLUTION,INJ
1 VIAL (ML) INTRAVENOUS
Refills: 0 | Status: DISCONTINUED | OUTPATIENT
Start: 2022-10-28 | End: 2022-10-28

## 2022-10-28 RX ORDER — I.V. FAT EMULSION 20 G/100ML
0.74 EMULSION INTRAVENOUS
Qty: 50 | Refills: 0 | Status: DISCONTINUED | OUTPATIENT
Start: 2022-10-28 | End: 2022-10-28

## 2022-10-28 RX ADMIN — I.V. FAT EMULSION 20.83 GM/KG/DAY: 20 EMULSION INTRAVENOUS at 18:05

## 2022-10-28 RX ADMIN — ALBUTEROL 2 PUFF(S): 90 AEROSOL, METERED ORAL at 16:04

## 2022-10-28 RX ADMIN — Medication 3 MILLILITER(S): at 06:33

## 2022-10-28 RX ADMIN — Medication 3 MILLILITER(S): at 12:35

## 2022-10-28 RX ADMIN — Medication 3 MILLILITER(S): at 18:05

## 2022-10-28 RX ADMIN — HEPARIN SODIUM 5000 UNIT(S): 5000 INJECTION INTRAVENOUS; SUBCUTANEOUS at 13:44

## 2022-10-28 RX ADMIN — PANTOPRAZOLE SODIUM 40 MILLIGRAM(S): 20 TABLET, DELAYED RELEASE ORAL at 06:33

## 2022-10-28 RX ADMIN — ALBUTEROL 2 PUFF(S): 90 AEROSOL, METERED ORAL at 03:19

## 2022-10-28 RX ADMIN — HEPARIN SODIUM 5000 UNIT(S): 5000 INJECTION INTRAVENOUS; SUBCUTANEOUS at 06:33

## 2022-10-28 RX ADMIN — CHLORHEXIDINE GLUCONATE 1 APPLICATION(S): 213 SOLUTION TOPICAL at 06:40

## 2022-10-28 RX ADMIN — HEPARIN SODIUM 5000 UNIT(S): 5000 INJECTION INTRAVENOUS; SUBCUTANEOUS at 22:19

## 2022-10-28 RX ADMIN — Medication 3 MILLILITER(S): at 00:42

## 2022-10-28 RX ADMIN — Medication 1 EACH: at 18:05

## 2022-10-28 NOTE — PROGRESS NOTE ADULT - ASSESSMENT
PMH HTN, HLD, asthma, colon cancer s/p laparoscopic right colectomy (2016), chronic diverticulitis with known colovesicular fistula since July 2022, recently admitted (9/19-26) w/ chronic cystitis and bacteremia treated with IV abx, now s/p elective laparoscopic resection of colovesicular fistula, cystotomy closure, and bilateral ureteral stent placement 10/06/2022. Was discharged and now readmitted 10/18 for high grade SBO found on outpatient imaging with concerns of pelvic collection s/p aspiration by IR 10/19, now pending Cx. Pending return of bowel function. ROSA Cr 10/22 22.17, 10/22 2.54 s/p SBR 10/24, NGT removed 10/26, now advancing diet    Recommendations:  - Keep Degroot catheter while inpatient - do not remove even given UCx results  - diet per general surgery  - if becomes febrile, would treat UCx and collection Cx.  - discussed with attending  - no acute  interventions at this time  - please check a recent ROSA Cr  - if staying until 10/31, CT cystogram inpatient  - if dc before then, f/u with Dr. Torre 10/31 for in office cystogram    CHAN Paz MD (PGY-2)  Consult Urology Resident  Please feel free to reach out on Teams Chat

## 2022-10-28 NOTE — PROGRESS NOTE ADULT - SUBJECTIVE AND OBJECTIVE BOX
UROLOGY PROGRESS NOTE    SUBJECTIVE: Patient seen and examined bedside. Patient diet advanced to LRD today, reports passing gas, stool in ostomy, and tolerating yepez. No Fevers, chills, nausea/vomiting.    heparin   Injectable 5000 Unit(s) SubCutaneous every 8 hours      Vital Signs Last 24 Hrs  T(C): 36.7 (28 Oct 2022 05:20), Max: 37.2 (27 Oct 2022 15:15)  T(F): 98.1 (28 Oct 2022 05:20), Max: 99 (27 Oct 2022 15:15)  HR: 68 (28 Oct 2022 08:18) (68 - 83)  BP: 143/70 (28 Oct 2022 08:18) (102/58 - 143/70)  BP(mean): --  RR: 18 (28 Oct 2022 08:18) (16 - 19)  SpO2: 94% (28 Oct 2022 08:18) (93% - 98%)    Parameters below as of 28 Oct 2022 08:18  Patient On (Oxygen Delivery Method): room air      I&O's Detail    27 Oct 2022 07:01  -  28 Oct 2022 07:00  --------------------------------------------------------  IN:    Fat Emulsion (Fish Oil &amp; Plant Based) 20% Infusion: 249.6 mL    IV PiggyBack: 100 mL    Oral Fluid: 600 mL    TPN (Total Parenteral Nutrition): 1323 mL  Total IN: 2272.6 mL    OUT:    Bulb (mL): 7 mL    Ileostomy (mL): 1250 mL    Indwelling Catheter - Urethral (mL): 1450 mL  Total OUT: 2707 mL    Total NET: -434.4 mL      28 Oct 2022 07:01  -  28 Oct 2022 10:11  --------------------------------------------------------  IN:    Oral Fluid: 240 mL    TPN (Total Parenteral Nutrition): 189 mL  Total IN: 429 mL    OUT:    Bulb (mL): 0 mL    Indwelling Catheter - Urethral (mL): 250 mL  Total OUT: 250 mL    Total NET: 179 mL          PHYSICAL EXAM    General: NAD, resting comfortably in bed  C/V: NSR  Pulm: Nonlabored breathing, no respiratory distress on room air  Abd: soft, distension improved, midline wound WTD clean/dry/intact, ROSA SS output, stoma pink with stool in bag  : yepez draining clear yellow urine, slight pink tinged  Extrem: WWP, no edema, SCDs in place      LABS:                        7.6    6.50  )-----------( 302      ( 28 Oct 2022 07:30 )             24.5     10-28    137  |  101  |  13  ----------------------------<  155<H>  4.4   |  29  |  0.44<L>    Ca    9.5      28 Oct 2022 07:30  Phos  3.3     10-28  Mg     2.1     10-28    TPro  x   /  Alb  x   /  TBili  x   /  DBili  0.2  /  AST  x   /  ALT  x   /  AlkPhos  x   10-27          CULTURES:          RADIOLOGY & ADDITIONAL STUDIES:

## 2022-10-28 NOTE — PROGRESS NOTE ADULT - SUBJECTIVE AND OBJECTIVE BOX
STATUS POST:  Exploratory laparotomy, small bowel resection, Omentectomy      POST OPERATIVE DAY #: 4    SUBJECTIVE:   Patient seen and examined at bedside by chief during AM rounds. No acute events noted overnight. Patient states he has no pain this morning. Reports he is tolerating clear liquid diet without nausea or vomiting. States he has been out of bed and ambulating with walker. No other complaints.    Vital Signs Last 24 Hrs  T(C): 36.7 (28 Oct 2022 05:20), Max: 37.2 (27 Oct 2022 15:15)  T(F): 98.1 (28 Oct 2022 05:20), Max: 99 (27 Oct 2022 15:15)  HR: 68 (28 Oct 2022 08:18) (68 - 83)  BP: 143/70 (28 Oct 2022 08:18) (102/58 - 143/70)  BP(mean): --  RR: 18 (28 Oct 2022 08:18) (16 - 19)  SpO2: 94% (28 Oct 2022 08:18) (93% - 98%)    Parameters below as of 28 Oct 2022 08:18  Patient On (Oxygen Delivery Method): room air        I&O's Summary    27 Oct 2022 07:01  -  28 Oct 2022 07:00  --------------------------------------------------------  IN: 2272.6 mL / OUT: 2707 mL / NET: -434.4 mL    28 Oct 2022 07:01  -  28 Oct 2022 09:58  --------------------------------------------------------  IN: 429 mL / OUT: 250 mL / NET: 179 mL        Physical Exam:  General: Resting comfortably in bed, NAD  HEENT: ATNC  Pulmonary: Nonlabored breathing, no respiratory distress, no accessory muscle use noted  Cardiovascular: NSR  Abdomen: Soft, nondistended, nontender to palpation, no rebound or guarding. Midline incision clean and packing/dressing replaced at bedside. ROSA noted with serous output. Ostomy pink and patent with stool  Extremities: WWP, SCDs in place, No significant edema appreciated    LABS:                        7.6    6.50  )-----------( 302      ( 28 Oct 2022 07:30 )             24.5     10-28    137  |  101  |  13  ----------------------------<  155<H>  4.4   |  29  |  0.44<L>    Ca    9.5      28 Oct 2022 07:30  Phos  3.3     10-28  Mg     2.1     10-28    TPro  x   /  Alb  x   /  TBili  x   /  DBili  0.2  /  AST  x   /  ALT  x   /  AlkPhos  x   10-27

## 2022-10-28 NOTE — PROGRESS NOTE ADULT - ASSESSMENT
67 M with PMH HTN, HLD, asthma, diverticulitis with colovesicular fistula and colon cancer s/p R hemicolectomy (2016) and now s/p exploratory laparotomy, segmental colon resection, resection of colovesicular fistula, partial cystectomy, bladder diverticulectomy, colorectal anastomosis, rigid sigmoidoscopy, loop ileostomy creation (10/06/22) complicated by a bladder leak (elevated Cr in ROSA) presenting with decreased ileostomy output and feeling bloated. Sent into the ED after findings of obstruction on CT scan. In th ED afeb, VSS, WBC 20. CT scan with SBO and complex fluid collection within the anterior pelvis measuring 10 by 5.6 cm now s/p exploratory laparotomy, SBR, partial omentectomy (10/24). POD4 and doing well. No acute events overnight. Tolerating CLD.    Advance to LR diet  PICC, continue TPN today  Pain/Nausea control PRN   SCDs/HSQ  IS/OOBA  JPx1  Keep Degroot  PT recs appreciated

## 2022-10-28 NOTE — PROGRESS NOTE ADULT - SUBJECTIVE AND OBJECTIVE BOX
tolerating PO  AVSS  Abd soft  stoma with 1.2 liters /24 hours  UO 1.4 liters/24  hours  labs OK  Stable H&H    OOB  PT  VNS   F/U  Advance diet  D/C IVAB

## 2022-10-28 NOTE — CHART NOTE - NSCHARTNOTEFT_GEN_A_CORE
Admitting Diagnosis:   Patient is a 67y old  Male who presents with a chief complaint of SBO (25 Oct 2022 09:22)      PAST MEDICAL & SURGICAL HISTORY:  Malignant neoplasm of hepatic flexure      Intestinal obstruction      Hypertension      High cholesterol      History of asthma      Vesicointestinal fistula      History of diverticulitis      Cystitis      H/O ascites      H/O left inguinal hernia repair      S/P right hemicolectomy  colon cancer      S/P exploratory laparotomy      Enterovesical fistula  closure with cystotomy      S/P colon resection      S/P ureteral stent placement          Current Nutrition Order:   Diet, Regular:   Supplement Feeding Modality:  Oral  Ensure Enlive Cans or Servings Per Day:  1       Frequency:  Three Times a day (10-28-22 @ 15:18)  Diet, Low Fiber (10-28-22 @ 08:25)      PO Intake: Good (%) [   ]  Fair (50-75%) [ x  ] Poor (<25%) [   ]    GI Issues: Denies n/v/d/c. Last BM 10/27, liquid/green via ostomy, output 150 cc x 24hrs  Denies abd discomfort/distention    Pain: Denies pain    Skin Integrity: David 18, surgical incision noted  No PU or edema    Labs:   10-28    137  |  101  |  13  ----------------------------<  155<H>  4.4   |  29  |  0.44<L>    Ca    9.5      28 Oct 2022 07:30  Phos  3.3     10-28  Mg     2.1     10-28    TPro  x   /  Alb  x   /  TBili  x   /  DBili  0.2  /  AST  x   /  ALT  x   /  AlkPhos  x   10-27    CAPILLARY BLOOD GLUCOSE          Medications:  MEDICATIONS  (STANDING):  albuterol/ipratropium for Nebulization 3 milliLiter(s) Nebulizer every 6 hours  chlorhexidine 2% Cloths 1 Application(s) Topical <User Schedule>  fat emulsion (Fish Oil and Plant Based) 20% Infusion 0.7352 Gm/kG/Day (20.83 mL/Hr) IV Continuous <Continuous>  heparin   Injectable 5000 Unit(s) SubCutaneous every 8 hours  influenza  Vaccine (HIGH DOSE) 0.7 milliLiter(s) IntraMuscular once  pantoprazole  Injectable 40 milliGRAM(s) IV Push every 24 hours  Parenteral Nutrition - Adult 1 Each (63 mL/Hr) TPN Continuous <Continuous>  Parenteral Nutrition - Adult 1 Each (63 mL/Hr) TPN Continuous <Continuous>    MEDICATIONS  (PRN):  acetaminophen     Tablet .. 650 milliGRAM(s) Oral every 6 hours PRN Mild Pain (1 - 3), Moderate Pain (4 - 6)  ALBUTerol    90 MICROgram(s) HFA Inhaler 2 Puff(s) Inhalation every 6 hours PRN Shortness of Breath and/or Wheezing  benzocaine 15 mG/menthol 3.6 mG Lozenge 1 Lozenge Oral two times a day PRN Sore Throat  HYDROmorphone  Injectable 0.5 milliGRAM(s) IV Push every 4 hours PRN Moderate Pain (4 - 6)  HYDROmorphone  Injectable 1 milliGRAM(s) IV Push every 6 hours PRN Severe Pain (7 - 10)  ondansetron Injectable 4 milliGRAM(s) IV Push every 6 hours PRN Nausea  sodium chloride 0.9% lock flush 10 milliLiter(s) IV Push every 1 hour PRN Pre/post blood products, medications, blood draw, and to maintain line patency    Adm Anthropometrics:  Height for BMI (FEET)	5 Feet  Height for BMI (INCHES)	8 Inch(s)  Height for BMI (CENTIMETERS)	172.72 Centimeter(s)  Weight for BMI (lbs)	150 lb  Weight for BMI (kg)	68 kg  Body Mass Index	22.7    Weight Change:  10/23 68 kg/149.9 lbs    Nutrition Focused Physical Exam: Completed [  x ]  Not Pertinent [   ]  please see malnutrition chart note 10/20    Estimated energy needs:   Estimated needs based on ABW as pt's weight is % IBW (97%). Needs adjusted for advanced age & malnutrition.   Adjust fluids as needed per team.  7755-2693 kcals (30-35 kcals/kg, CBW)  88.4-102 g protein (1.3-1.5 g/kg, CBW)  7378-1269 mls (1.3-1.5 mls/kg, CBW)    Subjective:  67 yr old M PMH HTN, HLD, asthma, colon cancer s/p laparoscopic right colectomy (2016), chronic diverticulitis with known colovesicular fistula since July 2022, recently admitted (9/19-26) w/ chronic cystitis and bacteremia treated with IV abx, now s/p elective laparoscopic resection of colovesicular fistula, cystotomy closure, and bilateral ureteral stent placement 10/06/2022. Was discharged and now readmitted 10/18 for high grade SBO found on outpatient imaging with concerns of pelvic collection. NGT placed for suction 10/19. PICC line placed 10/20. CT scan with SBO and complex fluid collection within the anterior pelvis measuring 10 by 5.6 cm now s/p exploratory laparotomy, SBR, partial omentectomy (10/24). Doing well. No acute events overnight.    Pt seen resting in bed. Finished meal tray, ate ~50% of meal. Tolerating well per pt report, denies n/v, no pain, endorses liquid BMs in ostomy bag, output 150 cc x 24hrs. Continues on TPN at goal. Encourage PO as able with focus on protein. Requests ONS to meet needs. RD to follow.    Previous Nutrition Diagnosis:  Severe protein calorie malnutrition  RT diminished intake secondary to anorexia & abd pain  AEB 0% intake >5 days, 12% weight loss in 1 month, & moderate & severe wasting  Active [ x  ]  Resolved [   ]    If resolved, new PES:     Goal: Consistently meet >75% est needs during hospital stay via most feasible route    Recommendations:  1. Continue low fiber diet, Ensure Plus High Protein TID (1050 kcal, 60g protein, 540mL free H2O)   >> Honor pt preferences as able  2. TPN: 340 g dex, 100 g AA , 50 g lipids to provide 2056 kcal, GIR 3.5, (1.5 g/kg)  >>Can wean TPN if pt able to meet >75% of EER  >>Fluids & electrolytes per team   >>Monitor triglycerides & LFTs weekly, Mg/K/Phos daily, & POC BG q6hrs  >>Trend daily weights  3. Pain & bowel regimen per team   4. Diet edu prn    Education: Continue to encourage PO    Risk Level: High [  x ] Moderate [   ] Low [   ]

## 2022-10-29 LAB
ANION GAP SERPL CALC-SCNC: 9 MMOL/L — SIGNIFICANT CHANGE UP (ref 5–17)
BUN SERPL-MCNC: 13 MG/DL — SIGNIFICANT CHANGE UP (ref 7–23)
CALCIUM SERPL-MCNC: 9.3 MG/DL — SIGNIFICANT CHANGE UP (ref 8.4–10.5)
CHLORIDE SERPL-SCNC: 101 MMOL/L — SIGNIFICANT CHANGE UP (ref 96–108)
CO2 SERPL-SCNC: 26 MMOL/L — SIGNIFICANT CHANGE UP (ref 22–31)
CREAT SERPL-MCNC: 0.42 MG/DL — LOW (ref 0.5–1.3)
EGFR: 117 ML/MIN/1.73M2 — SIGNIFICANT CHANGE UP
GLUCOSE SERPL-MCNC: 151 MG/DL — HIGH (ref 70–99)
HCT VFR BLD CALC: 25.4 % — LOW (ref 39–50)
HGB BLD-MCNC: 8 G/DL — LOW (ref 13–17)
MAGNESIUM SERPL-MCNC: 2 MG/DL — SIGNIFICANT CHANGE UP (ref 1.6–2.6)
MCHC RBC-ENTMCNC: 25.3 PG — LOW (ref 27–34)
MCHC RBC-ENTMCNC: 31.5 GM/DL — LOW (ref 32–36)
MCV RBC AUTO: 80.4 FL — SIGNIFICANT CHANGE UP (ref 80–100)
NRBC # BLD: 0 /100 WBCS — SIGNIFICANT CHANGE UP (ref 0–0)
PHOSPHATE SERPL-MCNC: 3.2 MG/DL — SIGNIFICANT CHANGE UP (ref 2.5–4.5)
PLATELET # BLD AUTO: 338 K/UL — SIGNIFICANT CHANGE UP (ref 150–400)
POTASSIUM SERPL-MCNC: 4.2 MMOL/L — SIGNIFICANT CHANGE UP (ref 3.5–5.3)
POTASSIUM SERPL-SCNC: 4.2 MMOL/L — SIGNIFICANT CHANGE UP (ref 3.5–5.3)
RBC # BLD: 3.16 M/UL — LOW (ref 4.2–5.8)
RBC # FLD: 18.8 % — HIGH (ref 10.3–14.5)
SODIUM SERPL-SCNC: 136 MMOL/L — SIGNIFICANT CHANGE UP (ref 135–145)
WBC # BLD: 7.1 K/UL — SIGNIFICANT CHANGE UP (ref 3.8–10.5)
WBC # FLD AUTO: 7.1 K/UL — SIGNIFICANT CHANGE UP (ref 3.8–10.5)

## 2022-10-29 RX ORDER — ELECTROLYTE SOLUTION,INJ
1 VIAL (ML) INTRAVENOUS
Refills: 0 | Status: DISCONTINUED | OUTPATIENT
Start: 2022-10-29 | End: 2022-10-29

## 2022-10-29 RX ORDER — I.V. FAT EMULSION 20 G/100ML
0.74 EMULSION INTRAVENOUS
Qty: 50 | Refills: 0 | Status: DISCONTINUED | OUTPATIENT
Start: 2022-10-29 | End: 2022-10-29

## 2022-10-29 RX ADMIN — CHLORHEXIDINE GLUCONATE 1 APPLICATION(S): 213 SOLUTION TOPICAL at 05:30

## 2022-10-29 RX ADMIN — Medication 3 MILLILITER(S): at 05:35

## 2022-10-29 RX ADMIN — I.V. FAT EMULSION 20.8 GM/KG/DAY: 20 EMULSION INTRAVENOUS at 17:29

## 2022-10-29 RX ADMIN — Medication 3 MILLILITER(S): at 17:25

## 2022-10-29 RX ADMIN — Medication 3 MILLILITER(S): at 23:18

## 2022-10-29 RX ADMIN — Medication 3 MILLILITER(S): at 00:09

## 2022-10-29 RX ADMIN — HEPARIN SODIUM 5000 UNIT(S): 5000 INJECTION INTRAVENOUS; SUBCUTANEOUS at 23:18

## 2022-10-29 RX ADMIN — Medication 1 EACH: at 17:29

## 2022-10-29 RX ADMIN — Medication 3 MILLILITER(S): at 11:27

## 2022-10-29 RX ADMIN — HEPARIN SODIUM 5000 UNIT(S): 5000 INJECTION INTRAVENOUS; SUBCUTANEOUS at 15:16

## 2022-10-29 RX ADMIN — HEPARIN SODIUM 5000 UNIT(S): 5000 INJECTION INTRAVENOUS; SUBCUTANEOUS at 05:36

## 2022-10-29 RX ADMIN — PANTOPRAZOLE SODIUM 40 MILLIGRAM(S): 20 TABLET, DELAYED RELEASE ORAL at 06:55

## 2022-10-29 NOTE — PROGRESS NOTE ADULT - ASSESSMENT
67 M with PMH HTN, HLD, asthma, diverticulitis with colovesicular fistula and colon cancer s/p R hemicolectomy (2016) and now s/p exploratory laparotomy, segmental colon resection, resection of colovesicular fistula, partial cystectomy, bladder diverticulectomy, colorectal anastomosis, rigid sigmoidoscopy, loop ileostomy creation (10/06/22) complicated by a bladder leak (elevated Cr in ROSA) presenting with decreased ileostomy output and feeling bloated. Sent into the ED after findings of obstruction on CT scan. In th ED afeb, VSS, WBC 20. CT scan with SBO and complex fluid collection within the anterior pelvis measuring 10 by 5.6 cm now s/p exploratory laparotomy, SBR, partial omentectomy (10/24). POD5 and doing well. No acute events overnight. Tolerating LRD.     Advance to LR diet  PICC, continue TPN today  Pain/Nausea control PRN   SCDs/HSQ  IS/OOBA  JPx1  Keep Degroot  F/u urology recs   PT recs appreciated

## 2022-10-29 NOTE — PROGRESS NOTE ADULT - SUBJECTIVE AND OBJECTIVE BOX
SUBJECTIVE: Patient seen and examined bedside with chief resident. Denies pain. Has been OOB. Patient denies n/v. Having stool and gas in ostomy bag.     Vital Signs Last 24 Hrs  T(C): 36.6 (29 Oct 2022 20:40), Max: 37.1 (29 Oct 2022 05:30)  T(F): 97.8 (29 Oct 2022 20:40), Max: 98.7 (29 Oct 2022 05:30)  HR: 80 (29 Oct 2022 20:40) (69 - 89)  BP: 112/72 (29 Oct 2022 20:40) (112/72 - 124/64)  BP(mean): --  RR: 16 (29 Oct 2022 20:40) (15 - 18)  SpO2: 96% (29 Oct 2022 20:40) (96% - 99%)    Parameters below as of 29 Oct 2022 20:40  Patient On (Oxygen Delivery Method): room air        I&O's Summary    28 Oct 2022 07:01  -  29 Oct 2022 07:00  --------------------------------------------------------  IN: 2541 mL / OUT: 4000 mL / NET: -1459 mL    29 Oct 2022 07:01  -  29 Oct 2022 22:01  --------------------------------------------------------  IN: 1100 mL / OUT: 1775 mL / NET: -675 mL        Physical Exam:  General Appearance: AAOx3, NAD  Pulmonary: Nonlabored breathing, no respiratory distress  Cardiovascular: NSR  Abdomen: Soft, nondistended, nontender to palpation, no rebound or guarding. ROSA noted with serous output. Ostomy pink and patent with stool  Extremities: WWP, SCD's in place , no edema     LABS:                        8.0    7.10  )-----------( 338      ( 29 Oct 2022 06:58 )             25.4     10-29    136  |  101  |  13  ----------------------------<  151<H>  4.2   |  26  |  0.42<L>    Ca    9.3      29 Oct 2022 06:58  Phos  3.2     10-29  Mg     2.0     10-29

## 2022-10-30 LAB
ANION GAP SERPL CALC-SCNC: 10 MMOL/L — SIGNIFICANT CHANGE UP (ref 5–17)
BUN SERPL-MCNC: 16 MG/DL — SIGNIFICANT CHANGE UP (ref 7–23)
CALCIUM SERPL-MCNC: 9.8 MG/DL — SIGNIFICANT CHANGE UP (ref 8.4–10.5)
CHLORIDE SERPL-SCNC: 103 MMOL/L — SIGNIFICANT CHANGE UP (ref 96–108)
CO2 SERPL-SCNC: 24 MMOL/L — SIGNIFICANT CHANGE UP (ref 22–31)
CREAT SERPL-MCNC: 0.37 MG/DL — LOW (ref 0.5–1.3)
EGFR: 122 ML/MIN/1.73M2 — SIGNIFICANT CHANGE UP
GLUCOSE SERPL-MCNC: 134 MG/DL — HIGH (ref 70–99)
MAGNESIUM SERPL-MCNC: 2.8 MG/DL — HIGH (ref 1.6–2.6)
PHOSPHATE SERPL-MCNC: 3.1 MG/DL — SIGNIFICANT CHANGE UP (ref 2.5–4.5)
POTASSIUM SERPL-MCNC: 4.8 MMOL/L — SIGNIFICANT CHANGE UP (ref 3.5–5.3)
POTASSIUM SERPL-SCNC: 4.8 MMOL/L — SIGNIFICANT CHANGE UP (ref 3.5–5.3)
SODIUM SERPL-SCNC: 137 MMOL/L — SIGNIFICANT CHANGE UP (ref 135–145)

## 2022-10-30 RX ORDER — LOPERAMIDE HCL 2 MG
2 TABLET ORAL DAILY
Refills: 0 | Status: DISCONTINUED | OUTPATIENT
Start: 2022-10-30 | End: 2022-10-31

## 2022-10-30 RX ORDER — I.V. FAT EMULSION 20 G/100ML
0.74 EMULSION INTRAVENOUS
Qty: 50 | Refills: 0 | Status: DISCONTINUED | OUTPATIENT
Start: 2022-10-30 | End: 2022-10-31

## 2022-10-30 RX ORDER — LOPERAMIDE HCL 2 MG
2 TABLET ORAL EVERY 12 HOURS
Refills: 0 | Status: DISCONTINUED | OUTPATIENT
Start: 2022-10-30 | End: 2022-10-30

## 2022-10-30 RX ORDER — ELECTROLYTE SOLUTION,INJ
1 VIAL (ML) INTRAVENOUS
Refills: 0 | Status: DISCONTINUED | OUTPATIENT
Start: 2022-10-30 | End: 2022-10-30

## 2022-10-30 RX ORDER — ELECTROLYTE SOLUTION,INJ
1 VIAL (ML) INTRAVENOUS
Refills: 0 | Status: DISCONTINUED | OUTPATIENT
Start: 2022-10-30 | End: 2022-10-31

## 2022-10-30 RX ORDER — IRON SUCROSE 20 MG/ML
100 INJECTION, SOLUTION INTRAVENOUS EVERY 24 HOURS
Refills: 0 | Status: DISCONTINUED | OUTPATIENT
Start: 2022-10-30 | End: 2022-11-01

## 2022-10-30 RX ORDER — IRON SUCROSE 20 MG/ML
100 INJECTION, SOLUTION INTRAVENOUS EVERY 24 HOURS
Refills: 0 | Status: DISCONTINUED | OUTPATIENT
Start: 2022-10-30 | End: 2022-10-30

## 2022-10-30 RX ADMIN — Medication 3 MILLILITER(S): at 06:05

## 2022-10-30 RX ADMIN — HEPARIN SODIUM 5000 UNIT(S): 5000 INJECTION INTRAVENOUS; SUBCUTANEOUS at 23:54

## 2022-10-30 RX ADMIN — Medication 3 MILLILITER(S): at 12:45

## 2022-10-30 RX ADMIN — IRON SUCROSE 210 MILLIGRAM(S): 20 INJECTION, SOLUTION INTRAVENOUS at 07:29

## 2022-10-30 RX ADMIN — Medication 3 MILLILITER(S): at 23:54

## 2022-10-30 RX ADMIN — Medication 2 MILLIGRAM(S): at 16:33

## 2022-10-30 RX ADMIN — CHLORHEXIDINE GLUCONATE 1 APPLICATION(S): 213 SOLUTION TOPICAL at 06:21

## 2022-10-30 RX ADMIN — I.V. FAT EMULSION 20.83 GM/KG/DAY: 20 EMULSION INTRAVENOUS at 17:01

## 2022-10-30 RX ADMIN — HEPARIN SODIUM 5000 UNIT(S): 5000 INJECTION INTRAVENOUS; SUBCUTANEOUS at 06:05

## 2022-10-30 RX ADMIN — HEPARIN SODIUM 5000 UNIT(S): 5000 INJECTION INTRAVENOUS; SUBCUTANEOUS at 16:12

## 2022-10-30 RX ADMIN — Medication 1 EACH: at 17:01

## 2022-10-30 RX ADMIN — Medication 3 MILLILITER(S): at 17:02

## 2022-10-30 RX ADMIN — PANTOPRAZOLE SODIUM 40 MILLIGRAM(S): 20 TABLET, DELAYED RELEASE ORAL at 06:57

## 2022-10-30 NOTE — PROGRESS NOTE ADULT - SUBJECTIVE AND OBJECTIVE BOX
GENERAL SURGERY PROGRESS NOTE    SUBJECTIVE: Patient seen and examined bedside.    heparin   Injectable 5000 Unit(s) SubCutaneous every 8 hours      Vital Signs Last 24 Hrs  T(C): 36.6 (30 Oct 2022 08:45), Max: 36.7 (29 Oct 2022 12:49)  T(F): 97.9 (30 Oct 2022 08:45), Max: 98 (29 Oct 2022 12:49)  HR: 74 (30 Oct 2022 08:45) (74 - 89)  BP: 115/74 (30 Oct 2022 08:45) (112/72 - 123/78)  BP(mean): 93 (30 Oct 2022 05:00) (93 - 93)  RR: 18 (30 Oct 2022 08:45) (15 - 18)  SpO2: 98% (30 Oct 2022 08:45) (96% - 98%)    Parameters below as of 30 Oct 2022 08:45  Patient On (Oxygen Delivery Method): room air      I&O's Detail    29 Oct 2022 07:01  -  30 Oct 2022 07:00  --------------------------------------------------------  IN:    Fat Emulsion (Fish Oil &amp; Plant Based) 20% Infusion: 187.2 mL    Oral Fluid: 240 mL    TPN (Total Parenteral Nutrition): 1134 mL  Total IN: 1561.2 mL    OUT:    Bulb (mL): 0 mL    Ileostomy (mL): 1375 mL    Indwelling Catheter - Urethral (mL): 1350 mL  Total OUT: 2725 mL    Total NET: -1163.8 mL      30 Oct 2022 07:01  -  30 Oct 2022 12:19  --------------------------------------------------------  IN:    TPN (Total Parenteral Nutrition): 189 mL  Total IN: 189 mL    OUT:    Bulb (mL): 0 mL    Ileostomy (mL): 100 mL    Indwelling Catheter - Urethral (mL): 150 mL  Total OUT: 250 mL    Total NET: -61 mL          PHYSICAL EXAM    General: NAD, resting comfortably in bed  C/V: NSR  Pulm: Nonlabored breathing, no respiratory distress on room air  Abd: soft, ND, appropriate incisional tenderness, no rebound tenderness, no guarding  Extrem: WWP, no edema, SCDs in place        LABS:                        8.0    7.10  )-----------( 338      ( 29 Oct 2022 06:58 )             25.4     10-30    137  |  103  |  16  ----------------------------<  134<H>  4.8   |  24  |  0.37<L>    Ca    9.8      30 Oct 2022 06:03  Phos  3.1     10-30  Mg     2.8     10-30            RADIOLOGY & ADDITIONAL STUDIES:   GENERAL SURGERY PROGRESS NOTE    SUBJECTIVE: Patient seen and examined bedside. No acute complaints. Tolerating reg diet, -n,-v, endorses ambulating and OOB. Pt states that ostomy bag has good output. States that his albuterol medication has helped a lot with his respiratory status.     heparin   Injectable 5000 Unit(s) SubCutaneous every 8 hours      Vital Signs Last 24 Hrs  T(C): 36.6 (30 Oct 2022 08:45), Max: 36.7 (29 Oct 2022 12:49)  T(F): 97.9 (30 Oct 2022 08:45), Max: 98 (29 Oct 2022 12:49)  HR: 74 (30 Oct 2022 08:45) (74 - 89)  BP: 115/74 (30 Oct 2022 08:45) (112/72 - 123/78)  BP(mean): 93 (30 Oct 2022 05:00) (93 - 93)  RR: 18 (30 Oct 2022 08:45) (15 - 18)  SpO2: 98% (30 Oct 2022 08:45) (96% - 98%)    Parameters below as of 30 Oct 2022 08:45  Patient On (Oxygen Delivery Method): room air      I&O's Detail    29 Oct 2022 07:01  -  30 Oct 2022 07:00  --------------------------------------------------------  IN:    Fat Emulsion (Fish Oil &amp; Plant Based) 20% Infusion: 187.2 mL    Oral Fluid: 240 mL    TPN (Total Parenteral Nutrition): 1134 mL  Total IN: 1561.2 mL    OUT:    Bulb (mL): 0 mL    Ileostomy (mL): 1375 mL    Indwelling Catheter - Urethral (mL): 1350 mL  Total OUT: 2725 mL    Total NET: -1163.8 mL      30 Oct 2022 07:01  -  30 Oct 2022 12:19  --------------------------------------------------------  IN:    TPN (Total Parenteral Nutrition): 189 mL  Total IN: 189 mL    OUT:    Bulb (mL): 0 mL    Ileostomy (mL): 100 mL    Indwelling Catheter - Urethral (mL): 150 mL  Total OUT: 250 mL    Total NET: -61 mL          PHYSICAL EXAM    General: NAD, resting comfortably in bed  C/V: NSR  Pulm: Nonlabored breathing, no respiratory distress on room air  Abd: soft, ND, appropriate incisional tenderness that is packed with wet to dry, no rebound tenderness, incisions i/c/d, no guarding, high ostomy output of gas and stool, minimal to none jacob output  Extrem: WWP, no edema, SCDs in place  : 16 fr yepez draining clear yellow urine         LABS:                        8.0    7.10  )-----------( 338      ( 29 Oct 2022 06:58 )             25.4     10-30    137  |  103  |  16  ----------------------------<  134<H>  4.8   |  24  |  0.37<L>    Ca    9.8      30 Oct 2022 06:03  Phos  3.1     10-30  Mg     2.8     10-30            RADIOLOGY & ADDITIONAL STUDIES:

## 2022-10-30 NOTE — PROGRESS NOTE ADULT - ASSESSMENT
67 M with PMH HTN, HLD, asthma, diverticulitis with colovesicular fistula and colon cancer s/p R hemicolectomy (2016) and now s/p exploratory laparotomy, segmental colon resection, resection of colovesicular fistula, partial cystectomy, bladder diverticulectomy, colorectal anastomosis, rigid sigmoidoscopy, loop ileostomy creation (10/06/22) complicated by a bladder leak (elevated Cr in ROSA) presenting with decreased ileostomy output and feeling bloated. Sent into the ED after findings of obstruction on CT scan. In th ED afeb, VSS, WBC 20. CT scan with SBO and complex fluid collection within the anterior pelvis measuring 10 by 5.6 cm now s/p exploratory laparotomy, SBR, partial omentectomy (10/24). POD5 and doing well. No acute events overnight. Tolerating LRD.     Reg diet  PICC, continue TPN today  Pain/Nausea control PRN   SCDs/HSQ  IS/OOBA  JPx1  Keep Degroot  F/u urology recs   Give 2g imodium qd

## 2022-10-30 NOTE — PROGRESS NOTE ADULT - ASSESSMENT
PMH HTN, HLD, asthma, colon cancer s/p laparoscopic right colectomy (2016), chronic diverticulitis with known colovesicular fistula since July 2022, recently admitted (9/19-26) w/ chronic cystitis and bacteremia treated with IV abx, now s/p elective laparoscopic resection of colovesicular fistula, cystotomy closure, and bilateral ureteral stent placement 10/06/2022. Was discharged and now readmitted 10/18 for high grade SBO found on outpatient imaging with concerns of pelvic collection s/p aspiration by IR 10/19, now pending Cx. Pending return of bowel function. ROSA Cr 10/22 22.17, 10/22 2.54 s/p SBR 10/24, NGT removed 10/26, now tolerating low residue diet.    Recommendations:  - Keep Degroot catheter while inpatient - do not remove even given UCx results  - diet per general surgery  - if becomes febrile, would treat UCx and collection Cx.  - discussed with attending  - no acute  interventions at this time  - given pt staying past 10/31, please obtain CT cystogram on 10/31

## 2022-10-30 NOTE — PROGRESS NOTE ADULT - SUBJECTIVE AND OBJECTIVE BOX
UROLOGY PROGRESS NOTE    SUBJECTIVE: Patient seen and examined bedside. Patient diet advanced to LRD yd, reports passing gas, stool in ostomy, and tolerating yepez. No Fevers, chills, nausea/vomiting.    heparin   Injectable 5000 Unit(s) SubCutaneous every 8 hours    Vital Signs Last 24 Hrs  T(C): 36.6 (30 Oct 2022 17:35), Max: 36.6 (29 Oct 2022 20:40)  T(F): 97.8 (30 Oct 2022 17:35), Max: 97.9 (30 Oct 2022 08:45)  HR: 89 (30 Oct 2022 17:35) (74 - 97)  BP: 104/67 (30 Oct 2022 17:35) (104/67 - 123/78)  BP(mean): 93 (30 Oct 2022 05:00) (93 - 93)  RR: 16 (30 Oct 2022 17:35) (16 - 18)  SpO2: 97% (30 Oct 2022 17:35) (96% - 98%)    Parameters below as of 30 Oct 2022 17:35  Patient On (Oxygen Delivery Method): room air      I&O's Detail    29 Oct 2022 07:01  -  30 Oct 2022 07:00  --------------------------------------------------------  IN:    Fat Emulsion (Fish Oil &amp; Plant Based) 20% Infusion: 187.2 mL    Oral Fluid: 240 mL    TPN (Total Parenteral Nutrition): 1134 mL  Total IN: 1561.2 mL    OUT:    Bulb (mL): 0 mL    Ileostomy (mL): 1375 mL    Indwelling Catheter - Urethral (mL): 1350 mL  Total OUT: 2725 mL    Total NET: -1163.8 mL      30 Oct 2022 07:01  -  30 Oct 2022 19:05  --------------------------------------------------------  IN:    Oral Fluid: 480 mL    TPN (Total Parenteral Nutrition): 189 mL  Total IN: 669 mL    OUT:    Bulb (mL): 0 mL    Ileostomy (mL): 100 mL    Indwelling Catheter - Urethral (mL): 150 mL    Voided (mL): 500 mL  Total OUT: 750 mL    Total NET: -81 mL          PHYSICAL EXAM    General: NAD, resting comfortably in bed  C/V: NSR  Pulm: Nonlabored breathing, no respiratory distress on room air  Abd: soft, distension improved, midline wound WTD clean/dry/intact, ROSA SS output, stoma pink with stool in bag  : yepez draining clear yellow urine, slight pink tinged  Extrem: WWP, no edema, SCDs in place          LABS:                        8.0    7.10  )-----------( 338      ( 29 Oct 2022 06:58 )             25.4     10-30    137  |  103  |  16  ----------------------------<  134<H>  4.8   |  24  |  0.37<L>    Ca    9.8      30 Oct 2022 06:03  Phos  3.1     10-30  Mg     2.8     10-30            RADIOLOGY & ADDITIONAL STUDIES:          TPro  x   /  Alb  x   /  TBili  x   /  DBili  0.2  /  AST  x   /  ALT  x   /  AlkPhos  x   10-27          CULTURES:          RADIOLOGY & ADDITIONAL STUDIES:

## 2022-10-31 LAB
ANION GAP SERPL CALC-SCNC: 12 MMOL/L — SIGNIFICANT CHANGE UP (ref 5–17)
BUN SERPL-MCNC: 17 MG/DL — SIGNIFICANT CHANGE UP (ref 7–23)
CALCIUM SERPL-MCNC: 9.6 MG/DL — SIGNIFICANT CHANGE UP (ref 8.4–10.5)
CHLORIDE SERPL-SCNC: 96 MMOL/L — SIGNIFICANT CHANGE UP (ref 96–108)
CO2 SERPL-SCNC: 26 MMOL/L — SIGNIFICANT CHANGE UP (ref 22–31)
CREAT SERPL-MCNC: 0.5 MG/DL — SIGNIFICANT CHANGE UP (ref 0.5–1.3)
EGFR: 111 ML/MIN/1.73M2 — SIGNIFICANT CHANGE UP
GLUCOSE SERPL-MCNC: 145 MG/DL — HIGH (ref 70–99)
HCT VFR BLD CALC: 26.6 % — LOW (ref 39–50)
HGB BLD-MCNC: 8.4 G/DL — LOW (ref 13–17)
MAGNESIUM SERPL-MCNC: 1.8 MG/DL — SIGNIFICANT CHANGE UP (ref 1.6–2.6)
MCHC RBC-ENTMCNC: 25.1 PG — LOW (ref 27–34)
MCHC RBC-ENTMCNC: 31.6 GM/DL — LOW (ref 32–36)
MCV RBC AUTO: 79.6 FL — LOW (ref 80–100)
NRBC # BLD: 0 /100 WBCS — SIGNIFICANT CHANGE UP (ref 0–0)
PHOSPHATE SERPL-MCNC: 3.7 MG/DL — SIGNIFICANT CHANGE UP (ref 2.5–4.5)
PLATELET # BLD AUTO: 385 K/UL — SIGNIFICANT CHANGE UP (ref 150–400)
POTASSIUM SERPL-MCNC: 4.7 MMOL/L — SIGNIFICANT CHANGE UP (ref 3.5–5.3)
POTASSIUM SERPL-SCNC: 4.7 MMOL/L — SIGNIFICANT CHANGE UP (ref 3.5–5.3)
RBC # BLD: 3.34 M/UL — LOW (ref 4.2–5.8)
RBC # FLD: 19 % — HIGH (ref 10.3–14.5)
SARS-COV-2 RNA SPEC QL NAA+PROBE: SIGNIFICANT CHANGE UP
SODIUM SERPL-SCNC: 134 MMOL/L — LOW (ref 135–145)
WBC # BLD: 10.82 K/UL — HIGH (ref 3.8–10.5)
WBC # FLD AUTO: 10.82 K/UL — HIGH (ref 3.8–10.5)

## 2022-10-31 PROCEDURE — 74176 CT ABD & PELVIS W/O CONTRAST: CPT | Mod: 26

## 2022-10-31 RX ORDER — SODIUM CHLORIDE 9 MG/ML
500 INJECTION, SOLUTION INTRAVENOUS ONCE
Refills: 0 | Status: COMPLETED | OUTPATIENT
Start: 2022-10-31 | End: 2022-10-31

## 2022-10-31 RX ORDER — ACETAMINOPHEN 500 MG
650 TABLET ORAL EVERY 6 HOURS
Refills: 0 | Status: DISCONTINUED | OUTPATIENT
Start: 2022-10-31 | End: 2022-11-01

## 2022-10-31 RX ORDER — MAGNESIUM SULFATE 500 MG/ML
1 VIAL (ML) INJECTION ONCE
Refills: 0 | Status: COMPLETED | OUTPATIENT
Start: 2022-10-31 | End: 2022-10-31

## 2022-10-31 RX ORDER — LOPERAMIDE HCL 2 MG
2 TABLET ORAL EVERY 12 HOURS
Refills: 0 | Status: DISCONTINUED | OUTPATIENT
Start: 2022-10-31 | End: 2022-11-01

## 2022-10-31 RX ADMIN — Medication 3 MILLILITER(S): at 06:29

## 2022-10-31 RX ADMIN — Medication 2 MILLIGRAM(S): at 09:54

## 2022-10-31 RX ADMIN — Medication 3 MILLILITER(S): at 12:05

## 2022-10-31 RX ADMIN — Medication 100 GRAM(S): at 09:54

## 2022-10-31 RX ADMIN — HEPARIN SODIUM 5000 UNIT(S): 5000 INJECTION INTRAVENOUS; SUBCUTANEOUS at 15:14

## 2022-10-31 RX ADMIN — IRON SUCROSE 210 MILLIGRAM(S): 20 INJECTION, SOLUTION INTRAVENOUS at 06:52

## 2022-10-31 RX ADMIN — PANTOPRAZOLE SODIUM 40 MILLIGRAM(S): 20 TABLET, DELAYED RELEASE ORAL at 06:30

## 2022-10-31 RX ADMIN — SODIUM CHLORIDE 500 MILLILITER(S): 9 INJECTION, SOLUTION INTRAVENOUS at 09:54

## 2022-10-31 RX ADMIN — Medication 3 MILLILITER(S): at 17:55

## 2022-10-31 RX ADMIN — Medication 650 MILLIGRAM(S): at 01:47

## 2022-10-31 RX ADMIN — Medication 650 MILLIGRAM(S): at 02:54

## 2022-10-31 RX ADMIN — HEPARIN SODIUM 5000 UNIT(S): 5000 INJECTION INTRAVENOUS; SUBCUTANEOUS at 06:30

## 2022-10-31 RX ADMIN — Medication 2 MILLIGRAM(S): at 22:22

## 2022-10-31 RX ADMIN — HEPARIN SODIUM 5000 UNIT(S): 5000 INJECTION INTRAVENOUS; SUBCUTANEOUS at 22:22

## 2022-10-31 RX ADMIN — CHLORHEXIDINE GLUCONATE 1 APPLICATION(S): 213 SOLUTION TOPICAL at 06:29

## 2022-10-31 NOTE — PROGRESS NOTE ADULT - SUBJECTIVE AND OBJECTIVE BOX
STATUS POST:  10/24 Exploratory Lap, SBR, partial omentectomy      SUBJECTIVE: Patient seen and examined bedside by chief resident, no acute ON events. Patient tolerating diet, -N/-V. Ostomy + gas + stool output. Pain well controlled with medication, OOBA. Denies SOB/CP/ESTRADA    heparin   Injectable 5000 Unit(s) SubCutaneous every 8 hours      Vital Signs Last 24 Hrs  T(C): 36.3 (31 Oct 2022 04:44), Max: 36.6 (30 Oct 2022 17:35)  T(F): 97.3 (31 Oct 2022 04:44), Max: 97.8 (30 Oct 2022 17:35)  HR: 76 (31 Oct 2022 04:44) (75 - 97)  BP: 115/73 (31 Oct 2022 04:44) (104/67 - 115/73)  BP(mean): 81 (31 Oct 2022 00:52) (81 - 81)  RR: 18 (31 Oct 2022 04:44) (16 - 18)  SpO2: 98% (31 Oct 2022 04:44) (97% - 98%)    Parameters below as of 31 Oct 2022 04:44  Patient On (Oxygen Delivery Method): room air      I&O's Detail    30 Oct 2022 07:01  -  31 Oct 2022 07:00  --------------------------------------------------------  IN:    Fat Emulsion (Fish Oil &amp; Plant Based) 20% Infusion: 228.8 mL    Oral Fluid: 480 mL    TPN (Total Parenteral Nutrition): 882 mL  Total IN: 1590.8 mL    OUT:    Bulb (mL): 0 mL    Ileostomy (mL): 1400 mL    Indwelling Catheter - Urethral (mL): 1375 mL  Total OUT: 2775 mL    Total NET: -1184.2 mL          General: NAD, resting comfortably in bed  C/V: NSR  Pulm: Nonlabored breathing, no respiratory distress  Abd: soft, ND, + TTP near incisions and ostomy site. No rebound or guarding  Ostomy; P/P/P, +gas/+stool   ROSA: + suction, minimal SS output  Incisions: C/D/I  Degroot; yellow urine   Extrem: WWP, no edema, SCDs in place        LABS:                        8.4    10.82 )-----------( 385      ( 31 Oct 2022 05:47 )             26.6     10-31    134<L>  |  96  |  17  ----------------------------<  145<H>  4.7   |  26  |  0.50    Ca    9.6      31 Oct 2022 05:47  Phos  3.7     10-31  Mg     1.8     10-31            RADIOLOGY & ADDITIONAL STUDIES:

## 2022-10-31 NOTE — PROGRESS NOTE ADULT - ASSESSMENT
PMH HTN, HLD, asthma, colon cancer s/p laparoscopic right colectomy (2016), chronic diverticulitis with known colovesicular fistula since July 2022, recently admitted (9/19-26) w/ chronic cystitis and bacteremia treated with IV abx, now s/p elective laparoscopic resection of colovesicular fistula, cystotomy closure, and bilateral ureteral stent placement 10/06/2022. Was discharged and now readmitted 10/18 for high grade SBO found on outpatient imaging with concerns of pelvic collection s/p aspiration by IR 10/19, now pending Cx. Pending return of bowel function. ROSA Cr 10/22 22.17, 10/22 2.54 s/p SBR 10/24, NGT removed 10/26, now tolerating low residue diet.    Recommendations:  - Keep Degroot catheter while inpatient - do not remove even given UCx results  - diet per general surgery  - if becomes febrile, would treat UCx and collection Cx.  - discussed with attending  - no acute  interventions at this time  - Please obtain CT cystogram today  - Please give Levaquin PO 500mg x1 dose today for prophylaxis for cystogram

## 2022-10-31 NOTE — PROGRESS NOTE ADULT - SUBJECTIVE AND OBJECTIVE BOX
UROLOGY PROGRESS NOTE    SUBJECTIVE: Patient seen and examined bedside.    heparin   Injectable 5000 Unit(s) SubCutaneous every 8 hours      Vital Signs Last 24 Hrs  T(C): 36.3 (31 Oct 2022 09:00), Max: 36.6 (30 Oct 2022 17:35)  T(F): 97.3 (31 Oct 2022 09:00), Max: 97.8 (30 Oct 2022 17:35)  HR: 77 (31 Oct 2022 09:00) (75 - 97)  BP: 114/69 (31 Oct 2022 09:00) (104/67 - 115/73)  BP(mean): 81 (31 Oct 2022 00:52) (81 - 81)  RR: 19 (31 Oct 2022 09:00) (16 - 19)  SpO2: 97% (31 Oct 2022 09:00) (97% - 98%)    Parameters below as of 31 Oct 2022 09:00  Patient On (Oxygen Delivery Method): room air      I&O's Detail    30 Oct 2022 07:01  -  31 Oct 2022 07:00  --------------------------------------------------------  IN:    Fat Emulsion (Fish Oil &amp; Plant Based) 20% Infusion: 228.8 mL    Oral Fluid: 480 mL    TPN (Total Parenteral Nutrition): 882 mL  Total IN: 1590.8 mL    OUT:    Bulb (mL): 0 mL    Ileostomy (mL): 1400 mL    Indwelling Catheter - Urethral (mL): 1375 mL  Total OUT: 2775 mL    Total NET: -1184.2 mL      31 Oct 2022 07:01  -  31 Oct 2022 10:49  --------------------------------------------------------  IN:    Oral Fluid: 240 mL    TPN (Total Parenteral Nutrition): 189 mL  Total IN: 429 mL    OUT:    Bulb (mL): 0 mL    Ileostomy (mL): 150 mL    Indwelling Catheter - Urethral (mL): 450 mL  Total OUT: 600 mL    Total NET: -171 mL          PHYSICAL EXAM    General: NAD, resting comfortably in bed  C/V: NSR  Pulm: Nonlabored breathing, no respiratory distress on room air  Abd: soft, ND, appropriate incisional tenderness, no rebound tenderness, no guarding  Extrem: WWP, no edema, SCDs in place        LABS:                        8.4    10.82 )-----------( 385      ( 31 Oct 2022 05:47 )             26.6     10-31    134<L>  |  96  |  17  ----------------------------<  145<H>  4.7   |  26  |  0.50    Ca    9.6      31 Oct 2022 05:47  Phos  3.7     10-31  Mg     1.8     10-31            RADIOLOGY & ADDITIONAL STUDIES:

## 2022-10-31 NOTE — PROGRESS NOTE ADULT - ASSESSMENT
67 M with PMH HTN, HLD, asthma, diverticulitis with colovesicular fistula and colon cancer s/p R hemicolectomy (2016) and now s/p exploratory laparotomy, segmental colon resection, resection of colovesicular fistula, partial cystectomy, bladder diverticulectomy, colorectal anastomosis, rigid sigmoidoscopy, loop ileostomy creation (10/06/22) complicated by a bladder leak (elevated Cr in ORSA) presenting with decreased ileostomy output and feeling bloated. Sent into the ED after findings of obstruction on CT scan. In th ED afeb, VSS, WBC 20. CT scan with SBO and complex fluid collection within the anterior pelvis measuring 10 by 5.6 cm now s/p exploratory laparotomy, SBR, partial omentectomy (10/24). No acute events ON, patient tolerating diet, OOBA, functional ostomy.     Reg diet  PICC, TPN  Pain/Nausea control PRN   SCDs/HSQ  IS/OOBA  JPx1  Keep Degroot  Give 2g imodium qd  F/u urology for cystogram  Re-eval PT for home needs  67 M with PMH HTN, HLD, asthma, diverticulitis with colovesicular fistula and colon cancer s/p R hemicolectomy (2016) and now s/p exploratory laparotomy, segmental colon resection, resection of colovesicular fistula, partial cystectomy, bladder diverticulectomy, colorectal anastomosis, rigid sigmoidoscopy, loop ileostomy creation (10/06/22) complicated by a bladder leak (elevated Cr in ROSA) presenting with decreased ileostomy output and feeling bloated. Sent into the ED after findings of obstruction on CT scan. In th ED afeb, VSS, WBC 20. CT scan with SBO and complex fluid collection within the anterior pelvis measuring 10 by 5.6 cm now s/p exploratory laparotomy, SBR, partial omentectomy (10/24). No acute events ON, patient tolerating diet, OOBA, functional ostomy.     Reg diet  D/c PICC/ TPN   Pain/Nausea control PRN   SCDs/HSQ  IS/OOBA  JPx1  Keep Degroot  Give 2g imodium q12hr  F/u urology for cystogram  Re-eval PT for home needs

## 2022-11-01 ENCOUNTER — TRANSCRIPTION ENCOUNTER (OUTPATIENT)
Age: 67
End: 2022-11-01

## 2022-11-01 VITALS
DIASTOLIC BLOOD PRESSURE: 75 MMHG | RESPIRATION RATE: 18 BRPM | HEART RATE: 78 BPM | OXYGEN SATURATION: 98 % | TEMPERATURE: 99 F | SYSTOLIC BLOOD PRESSURE: 121 MMHG

## 2022-11-01 LAB
ANION GAP SERPL CALC-SCNC: 9 MMOL/L — SIGNIFICANT CHANGE UP (ref 5–17)
BUN SERPL-MCNC: 18 MG/DL — SIGNIFICANT CHANGE UP (ref 7–23)
CALCIUM SERPL-MCNC: 9.6 MG/DL — SIGNIFICANT CHANGE UP (ref 8.4–10.5)
CHLORIDE SERPL-SCNC: 99 MMOL/L — SIGNIFICANT CHANGE UP (ref 96–108)
CO2 SERPL-SCNC: 24 MMOL/L — SIGNIFICANT CHANGE UP (ref 22–31)
CREAT SERPL-MCNC: 0.54 MG/DL — SIGNIFICANT CHANGE UP (ref 0.5–1.3)
EGFR: 109 ML/MIN/1.73M2 — SIGNIFICANT CHANGE UP
GLUCOSE SERPL-MCNC: 111 MG/DL — HIGH (ref 70–99)
HCT VFR BLD CALC: 27.9 % — LOW (ref 39–50)
HGB BLD-MCNC: 8.8 G/DL — LOW (ref 13–17)
MAGNESIUM SERPL-MCNC: 1.9 MG/DL — SIGNIFICANT CHANGE UP (ref 1.6–2.6)
MCHC RBC-ENTMCNC: 25.6 PG — LOW (ref 27–34)
MCHC RBC-ENTMCNC: 31.5 GM/DL — LOW (ref 32–36)
MCV RBC AUTO: 81.1 FL — SIGNIFICANT CHANGE UP (ref 80–100)
NRBC # BLD: 0 /100 WBCS — SIGNIFICANT CHANGE UP (ref 0–0)
PHOSPHATE SERPL-MCNC: 4.2 MG/DL — SIGNIFICANT CHANGE UP (ref 2.5–4.5)
PLATELET # BLD AUTO: 396 K/UL — SIGNIFICANT CHANGE UP (ref 150–400)
POTASSIUM SERPL-MCNC: 4.8 MMOL/L — SIGNIFICANT CHANGE UP (ref 3.5–5.3)
POTASSIUM SERPL-SCNC: 4.8 MMOL/L — SIGNIFICANT CHANGE UP (ref 3.5–5.3)
RBC # BLD: 3.44 M/UL — LOW (ref 4.2–5.8)
RBC # FLD: 19.1 % — HIGH (ref 10.3–14.5)
SODIUM SERPL-SCNC: 132 MMOL/L — LOW (ref 135–145)
WBC # BLD: 10.74 K/UL — HIGH (ref 3.8–10.5)
WBC # FLD AUTO: 10.74 K/UL — HIGH (ref 3.8–10.5)

## 2022-11-01 RX ADMIN — Medication 2 MILLIGRAM(S): at 09:41

## 2022-11-01 RX ADMIN — Medication 3 MILLILITER(S): at 06:12

## 2022-11-01 RX ADMIN — IRON SUCROSE 210 MILLIGRAM(S): 20 INJECTION, SOLUTION INTRAVENOUS at 06:11

## 2022-11-01 RX ADMIN — HEPARIN SODIUM 5000 UNIT(S): 5000 INJECTION INTRAVENOUS; SUBCUTANEOUS at 06:12

## 2022-11-01 RX ADMIN — CHLORHEXIDINE GLUCONATE 1 APPLICATION(S): 213 SOLUTION TOPICAL at 06:12

## 2022-11-01 RX ADMIN — Medication 3 MILLILITER(S): at 00:15

## 2022-11-01 RX ADMIN — PANTOPRAZOLE SODIUM 40 MILLIGRAM(S): 20 TABLET, DELAYED RELEASE ORAL at 06:12

## 2022-11-01 NOTE — PROGRESS NOTE ADULT - ASSESSMENT
67 M with PMH HTN, HLD, asthma, diverticulitis with colovesicular fistula and colon cancer s/p R hemicolectomy (2016) and now s/p exploratory laparotomy, segmental colon resection, resection of colovesicular fistula, partial cystectomy, bladder diverticulectomy, colorectal anastomosis, rigid sigmoidoscopy, loop ileostomy creation (10/06/22) complicated by a bladder leak (elevated Cr in ROSA) presenting with decreased ileostomy output and feeling bloated. Sent into the ED after findings of obstruction on CT scan. In th ED afeb, VSS, WBC 20. CT scan with SBO and complex fluid collection within the anterior pelvis measuring 10 by 5.6 cm now s/p exploratory laparotomy, SBR, partial omentectomy (10/24). No acute complaints    Reg diet  D/c PICC line  Pain/Nausea control PRN   SCDs/HSQ  IS/OOBA  JPx1  F/u w. nephrology for recs of Degroot  Give 2g imodium q12hr  Dispo; Home with PT  67 M with PMH HTN, HLD, asthma, diverticulitis with colovesicular fistula and colon cancer s/p R hemicolectomy (2016) and now s/p exploratory laparotomy, segmental colon resection, resection of colovesicular fistula, partial cystectomy, bladder diverticulectomy, colorectal anastomosis, rigid sigmoidoscopy, loop ileostomy creation (10/06/22) complicated by a bladder leak (elevated Cr in ROSA) presenting with decreased ileostomy output and feeling bloated. Sent into the ED after findings of obstruction on CT scan. In th ED afeb, VSS, WBC 20. CT scan with SBO and complex fluid collection within the anterior pelvis measuring 10 by 5.6 cm now s/p exploratory laparotomy, SBR, partial omentectomy (10/24). No acute complaints    Reg diet  D/c PICC line  Pain/Nausea control PRN   SCDs/HSQ  IS/OOBA  JPx1  D/c yepez  Give 2g imodium q12hr  Dispo; Home with PT, ostomy care

## 2022-11-01 NOTE — PROGRESS NOTE ADULT - PROVIDER SPECIALTY LIST ADULT
Colorectal Surgery
Surgery
Urology
Colorectal Surgery
Colorectal Surgery
Surgery
Urology
Surgery
Urology
Urology
Surgery

## 2022-11-01 NOTE — DISCHARGE NOTE NURSING/CASE MANAGEMENT/SOCIAL WORK - PATIENT PORTAL LINK FT
You can access the FollowMyHealth Patient Portal offered by [facility] by registering at the following website: [email or web address]. By joining [employer]’s FollowMyHealth portal, you will also be able to view your health information using other applications (apps) compatible with our system.

## 2022-11-01 NOTE — DISCHARGE NOTE NURSING/CASE MANAGEMENT/SOCIAL WORK - NSDCPEFALRISK_GEN_ALL_CORE
For information on Fall & Injury Prevention, visit: [email or web address] OR  [email or web address] OR  https://www.cdc.gov/steadi/patient.html

## 2022-11-01 NOTE — PROGRESS NOTE ADULT - SUBJECTIVE AND OBJECTIVE BOX
STATUS POST:  10/24 Exploratory Lap, SBR, partial omentectomy      SUBJECTIVE: Patient seen and examined bedside by chief resident, no ON events. No acute complaints at this time. Patient tolerating diet without N/V. Tolerating yepez. OOBA. Denies SOB/CP/ESTRADA    heparin   Injectable 5000 Unit(s) SubCutaneous every 8 hours      Vital Signs Last 24 Hrs  T(C): 37.1 (01 Nov 2022 05:00), Max: 37.1 (01 Nov 2022 05:00)  T(F): 98.8 (01 Nov 2022 05:00), Max: 98.8 (01 Nov 2022 05:00)  HR: 82 (01 Nov 2022 05:00) (77 - 87)  BP: 115/75 (01 Nov 2022 05:00) (106/71 - 124/76)  BP(mean): 88 (01 Nov 2022 05:00) (86 - 88)  RR: 17 (01 Nov 2022 05:00) (16 - 19)  SpO2: 99% (01 Nov 2022 05:00) (96% - 100%)    Parameters below as of 01 Nov 2022 05:00  Patient On (Oxygen Delivery Method): room air      I&O's Detail    30 Oct 2022 07:01  -  31 Oct 2022 07:00  --------------------------------------------------------  IN:    Fat Emulsion (Fish Oil &amp; Plant Based) 20% Infusion: 228.8 mL    Oral Fluid: 480 mL    TPN (Total Parenteral Nutrition): 882 mL  Total IN: 1590.8 mL    OUT:    Bulb (mL): 0 mL    Ileostomy (mL): 1400 mL    Indwelling Catheter - Urethral (mL): 1375 mL  Total OUT: 2775 mL    Total NET: -1184.2 mL      31 Oct 2022 07:01  -  01 Nov 2022 06:43  --------------------------------------------------------  IN:    IV PiggyBack: 100 mL    Lactated Ringers Bolus: 500 mL    Oral Fluid: 710 mL    TPN (Total Parenteral Nutrition): 315 mL  Total IN: 1625 mL    OUT:    Bulb (mL): 0 mL    Ileostomy (mL): 1250 mL    Indwelling Catheter - Urethral (mL): 1450 mL  Total OUT: 2700 mL    Total NET: -1075 mL          General: NAD, resting comfortably in bed  C/V: NSR  Pulm: Nonlabored breathing, no respiratory distress  Abd: soft, NT/ND. No rebound or guarding  ROSA: to suction, minimal/ none SS output  Ostomy: P/P/P, + gas+ stool  Yepez: yellow urine  Extrem: WWP, no edema, SCDs in place        LABS:                        8.4    10.82 )-----------( 385      ( 31 Oct 2022 05:47 )             26.6     10-31    134<L>  |  96  |  17  ----------------------------<  145<H>  4.7   |  26  |  0.50    Ca    9.6      31 Oct 2022 05:47  Phos  3.7     10-31  Mg     1.8     10-31            RADIOLOGY & ADDITIONAL STUDIES:

## 2022-11-01 NOTE — PROGRESS NOTE ADULT - ASSESSMENT
PMH HTN, HLD, asthma, colon cancer s/p laparoscopic right colectomy (2016), chronic diverticulitis with known colovesicular fistula since July 2022, recently admitted (9/19-26) w/ chronic cystitis and bacteremia treated with IV abx, now s/p elective laparoscopic resection of colovesicular fistula, cystotomy closure, and bilateral ureteral stent placement 10/06/2022. Was discharged and now readmitted 10/18 for high grade SBO found on outpatient imaging with concerns of pelvic collection s/p aspiration by IR 10/19, now pending Cx. Pending return of bowel function. ROSA Cr 10/22 22.17, 10/22 2.54 s/p SBR 10/24, NGT removed 10/26, now tolerating low residue diet. CT Cystogram obtained on 10/31/22 showing no extravasation of contrast.    Recommendations:  - Keep Degroot catheter while inpatient - do not remove even given UCx results  - diet per general surgery  - if becomes febrile, would treat UCx and collection Cx.  - discussed with attending  - no acute  interventions at this time  - CT Cystogram yesterday without extravasation, please discontinue Degroot catheter  - Please give patient 1 additional dose Levaquin 500mg PO today for abx ppx given Degroot catheter removal

## 2022-11-01 NOTE — PROGRESS NOTE ADULT - SUBJECTIVE AND OBJECTIVE BOX
Treatment only today Avastin #4 OS. UROLOGY PROGRESS NOTE    SUBJECTIVE: Patient seen and examined bedside. HOOD.     heparin   Injectable 5000 Unit(s) SubCutaneous every 8 hours      Vital Signs Last 24 Hrs  T(C): 36.5 (01 Nov 2022 08:50), Max: 37.1 (01 Nov 2022 05:00)  T(F): 97.7 (01 Nov 2022 08:50), Max: 98.8 (01 Nov 2022 05:00)  HR: 74 (01 Nov 2022 08:50) (74 - 87)  BP: 107/65 (01 Nov 2022 08:50) (106/71 - 124/76)  BP(mean): 88 (01 Nov 2022 05:00) (86 - 88)  RR: 17 (01 Nov 2022 08:50) (16 - 18)  SpO2: 98% (01 Nov 2022 08:50) (96% - 100%)    Parameters below as of 01 Nov 2022 08:50  Patient On (Oxygen Delivery Method): room air      I&O's Detail    31 Oct 2022 07:01  -  01 Nov 2022 07:00  --------------------------------------------------------  IN:    IV PiggyBack: 100 mL    Lactated Ringers Bolus: 500 mL    Oral Fluid: 710 mL    TPN (Total Parenteral Nutrition): 315 mL  Total IN: 1625 mL    OUT:    Bulb (mL): 0 mL    Ileostomy (mL): 1400 mL    Indwelling Catheter - Urethral (mL): 1750 mL  Total OUT: 3150 mL    Total NET: -1525 mL      01 Nov 2022 07:01  -  01 Nov 2022 09:40  --------------------------------------------------------  IN:  Total IN: 0 mL    OUT:    Bulb (mL): 0 mL    Ileostomy (mL): 100 mL  Total OUT: 100 mL    Total NET: -100 mL          PHYSICAL EXAM    General: NAD, resting comfortably in bed  C/V: NSR  Pulm: Nonlabored breathing, no respiratory distress on room air  Abd: soft, ND, appropriate incisional tenderness, no rebound tenderness, no guarding  Extrem: WWP, no edema, SCDs in place        LABS:                        8.8    10.74 )-----------( 396      ( 01 Nov 2022 08:16 )             27.9     11-01    132<L>  |  99  |  18  ----------------------------<  111<H>  4.8   |  24  |  0.54    Ca    9.6      01 Nov 2022 08:16  Phos  4.2     11-01  Mg     1.9     11-01            RADIOLOGY & ADDITIONAL STUDIES:

## 2022-11-01 NOTE — PROGRESS NOTE ADULT - NUTRITIONAL ASSESSMENT
This patient has been assessed with a concern for Malnutrition and has been determined to have a diagnosis/diagnoses of Severe protein-calorie malnutrition.    This patient is being managed with:   Diet Low Fiber-  Entered: Oct 22 2022  9:00AM    
This patient has been assessed with a concern for Malnutrition and has been determined to have a diagnosis/diagnoses of Severe protein-calorie malnutrition.    This patient is being managed with:   Diet NPO-  Entered: Oct 23 2022  7:44AM    
This patient has been assessed with a concern for Malnutrition and has been determined to have a diagnosis/diagnoses of Severe protein-calorie malnutrition.    This patient is being managed with:   Diet Regular-  Supplement Feeding Modality:  Oral  Ensure Enlive Cans or Servings Per Day:  1       Frequency:  Three Times a day  Entered: Oct 28 2022  3:18PM    
This patient has been assessed with a concern for Malnutrition and has been determined to have a diagnosis/diagnoses of Severe protein-calorie malnutrition.    This patient is being managed with:   fat emulsion (Fish Oil and Plant Based) 20% Infusion-[Known as SMOFLIPID 20% Infusion]  50 Gram(s) in IV Solution 250 milliLiter(s) infuse at 20.8 mL/Hr  Dose Rate: 0.7353 Gm/kG/Day Infuse Over: 12 Hours; Stop After 12 Hours  Administration Instructions: Use 1.2 micron in-line filter  Entered: Oct 30 2022  5:00PM    Parenteral Nutrition - Adult-  Entered: Oct 30 2022  5:00PM    Parenteral Nutrition - Adult-  Entered: Oct 29 2022  5:00PM    Diet Regular-  Supplement Feeding Modality:  Oral  Ensure Enlive Cans or Servings Per Day:  1       Frequency:  Three Times a day  Entered: Oct 28 2022  3:18PM    
This patient has been assessed with a concern for Malnutrition and has been determined to have a diagnosis/diagnoses of Severe protein-calorie malnutrition.    This patient is being managed with:   Diet Clear Liquid-  Entered: Oct 27 2022  8:31AM    Parenteral Nutrition - Adult-  Entered: Oct 26 2022  5:00PM    
This patient has been assessed with a concern for Malnutrition and has been determined to have a diagnosis/diagnoses of Severe protein-calorie malnutrition.    This patient is being managed with:   Diet NPO-  Entered: Oct 23 2022  7:44AM    
This patient has been assessed with a concern for Malnutrition and has been determined to have a diagnosis/diagnoses of Severe protein-calorie malnutrition.    This patient is being managed with:   Diet NPO-  Entered: Oct 23 2022  7:44AM    
This patient has been assessed with a concern for Malnutrition and has been determined to have a diagnosis/diagnoses of Severe protein-calorie malnutrition.    This patient is being managed with:   Parenteral Nutrition - Adult-  Entered: Oct 29 2022  5:00PM    fat emulsion (Fish Oil and Plant Based) 20% Infusion-[Known as SMOFLIPID 20% Infusion]  50 Gram(s) in IV Solution 250 milliLiter(s) infuse at 20.8 mL/Hr  Dose Rate: 0.7353 Gm/kG/Day Infuse Over: 12 Hours; Stop After 12 Hours  Administration Instructions: Use 1.2 micron in-line filter  Entered: Oct 29 2022  5:00PM    Parenteral Nutrition - Adult-  Entered: Oct 28 2022  5:00PM    Diet Regular-  Supplement Feeding Modality:  Oral  Ensure Enlive Cans or Servings Per Day:  1       Frequency:  Three Times a day  Entered: Oct 28 2022  3:18PM    
This patient has been assessed with a concern for Malnutrition and has been determined to have a diagnosis/diagnoses of Severe protein-calorie malnutrition.    This patient is being managed with:   fat emulsion (Fish Oil and Plant Based) 20% Infusion-[Known as SMOFLIPID 20% Infusion]  50 Gram(s) in IV Solution 250 milliLiter(s) infuse at 20.83 mL/Hr  Dose Rate: 0.7353 Gm/kG/Day Infuse Over: 12 Hours; Stop After 12 Hours  Administration Instructions: Use 1.2 micron in-line filter  Entered: Oct 30 2022  5:00PM    Parenteral Nutrition - Adult-  Entered: Oct 30 2022  5:00PM    Diet Regular-  Supplement Feeding Modality:  Oral  Ensure Enlive Cans or Servings Per Day:  1       Frequency:  Three Times a day  Entered: Oct 28 2022  3:18PM    
This patient has been assessed with a concern for Malnutrition and has been determined to have a diagnosis/diagnoses of Severe protein-calorie malnutrition.    This patient is being managed with:   Diet Clear Liquid-  Entered: Oct 21 2022  5:22PM    
This patient has been assessed with a concern for Malnutrition and has been determined to have a diagnosis/diagnoses of Severe protein-calorie malnutrition.    This patient is being managed with:   Diet Low Fiber-  Entered: Oct 22 2022  9:00AM    
This patient has been assessed with a concern for Malnutrition and has been determined to have a diagnosis/diagnoses of Severe protein-calorie malnutrition.    This patient is being managed with:   Diet NPO-  Entered: Oct 23 2022  7:44AM    
This patient has been assessed with a concern for Malnutrition and has been determined to have a diagnosis/diagnoses of Severe protein-calorie malnutrition.    This patient is being managed with:   Diet NPO-  With Ice Chips/Sips of Water  Entered: Oct 21 2022  8:18AM    
This patient has been assessed with a concern for Malnutrition and has been determined to have a diagnosis/diagnoses of Severe protein-calorie malnutrition.    This patient is being managed with:   Diet Regular-  Supplement Feeding Modality:  Oral  Ensure Enlive Cans or Servings Per Day:  1       Frequency:  Three Times a day  Entered: Oct 28 2022  3:18PM    
This patient has been assessed with a concern for Malnutrition and has been determined to have a diagnosis/diagnoses of Severe protein-calorie malnutrition.    This patient is being managed with:   Parenteral Nutrition - Adult-  Entered: Oct 28 2022  5:00PM    fat emulsion (Fish Oil and Plant Based) 20% Infusion-[Known as SMOFLIPID 20% Infusion]  50 Gram(s) in IV Solution 250 milliLiter(s) infuse at 20.83 mL/Hr  Dose Rate: 0.7352 Gm/kG/Day Infuse Over: 12 Hours; Stop After 12 Hours  Administration Instructions: Use 1.2 micron in-line filter  Entered: Oct 28 2022  5:00PM    Diet Low Fiber-  Entered: Oct 28 2022  8:25AM    Parenteral Nutrition - Adult-  Entered: Oct 27 2022  5:00PM    
This patient has been assessed with a concern for Malnutrition and has been determined to have a diagnosis/diagnoses of Severe protein-calorie malnutrition.    This patient is being managed with:   Diet Low Fiber-  Entered: Oct 28 2022  8:25AM    Parenteral Nutrition - Adult-  Entered: Oct 27 2022  5:00PM    fat emulsion (Fish Oil and Plant Based) 20% Infusion-[Known as SMOFLIPID 20% Infusion]  50 Gram(s) in IV Solution 250 milliLiter(s) infuse at 20.83 mL/Hr  Dose Rate: 0.735 Gm/kG/Day Infuse Over: 12 Hours; Stop After 12 Hours  Administration Instructions: Use 1.2 micron in-line filter  Entered: Oct 27 2022  5:00PM    
This patient has been assessed with a concern for Malnutrition and has been determined to have a diagnosis/diagnoses of Severe protein-calorie malnutrition.    This patient is being managed with:   Diet NPO-  Entered: Oct 23 2022  7:44AM    
This patient has been assessed with a concern for Malnutrition and has been determined to have a diagnosis/diagnoses of Severe protein-calorie malnutrition.    This patient is being managed with:   Diet NPO-  With Ice Chips/Sips of Water  Entered: Oct 21 2022  8:18AM    
This patient has been assessed with a concern for Malnutrition and has been determined to have a diagnosis/diagnoses of Severe protein-calorie malnutrition.    This patient is being managed with:   fat emulsion (Fish Oil and Plant Based) 20% Infusion-[Known as SMOFLIPID 20% Infusion]  50 Gram(s) in IV Solution 250 milliLiter(s) infuse at 20.83 mL/Hr  Dose Rate: 0.7353 Gm/kG/Day Infuse Over: 12 Hours; Stop After 12 Hours  Administration Instructions: Use 1.2 micron in-line filter  Entered: Oct 30 2022  5:00PM    Parenteral Nutrition - Adult-  Entered: Oct 30 2022  5:00PM    Diet Regular-  Supplement Feeding Modality:  Oral  Ensure Enlive Cans or Servings Per Day:  1       Frequency:  Three Times a day  Entered: Oct 28 2022  3:18PM

## 2022-11-07 ENCOUNTER — APPOINTMENT (OUTPATIENT)
Dept: UROLOGY | Facility: CLINIC | Age: 67
End: 2022-11-07

## 2022-11-07 VITALS — SYSTOLIC BLOOD PRESSURE: 119 MMHG | TEMPERATURE: 97.9 F | DIASTOLIC BLOOD PRESSURE: 80 MMHG | HEART RATE: 90 BPM

## 2022-11-07 PROCEDURE — 52310 CYSTOSCOPY AND TREATMENT: CPT | Mod: 58

## 2022-11-07 RX ORDER — LEVOFLOXACIN 500 MG/1
500 TABLET, FILM COATED ORAL
Qty: 0 | Refills: 0 | Status: COMPLETED | OUTPATIENT
Start: 2022-11-07

## 2022-11-07 RX ADMIN — LEVOFLOXACIN 1 MG: 500 TABLET, FILM COATED ORAL at 00:00

## 2022-11-08 DIAGNOSIS — I10 ESSENTIAL (PRIMARY) HYPERTENSION: ICD-10-CM

## 2022-11-08 DIAGNOSIS — Z87.891 PERSONAL HISTORY OF NICOTINE DEPENDENCE: ICD-10-CM

## 2022-11-08 DIAGNOSIS — Z93.2 ILEOSTOMY STATUS: ICD-10-CM

## 2022-11-08 DIAGNOSIS — E43 UNSPECIFIED SEVERE PROTEIN-CALORIE MALNUTRITION: ICD-10-CM

## 2022-11-08 DIAGNOSIS — Z80.8 FAMILY HISTORY OF MALIGNANT NEOPLASM OF OTHER ORGANS OR SYSTEMS: ICD-10-CM

## 2022-11-08 DIAGNOSIS — B37.41 CANDIDAL CYSTITIS AND URETHRITIS: ICD-10-CM

## 2022-11-08 DIAGNOSIS — J45.909 UNSPECIFIED ASTHMA, UNCOMPLICATED: ICD-10-CM

## 2022-11-08 DIAGNOSIS — E78.00 PURE HYPERCHOLESTEROLEMIA, UNSPECIFIED: ICD-10-CM

## 2022-11-08 DIAGNOSIS — K56.50 INTESTINAL ADHESIONS [BANDS], UNSPECIFIED AS TO PARTIAL VERSUS COMPLETE OBSTRUCTION: ICD-10-CM

## 2022-11-08 DIAGNOSIS — Z85.038 PERSONAL HISTORY OF OTHER MALIGNANT NEOPLASM OF LARGE INTESTINE: ICD-10-CM

## 2022-11-08 PROCEDURE — 80053 COMPREHEN METABOLIC PANEL: CPT

## 2022-11-08 PROCEDURE — 97161 PT EVAL LOW COMPLEX 20 MIN: CPT

## 2022-11-08 PROCEDURE — 81001 URINALYSIS AUTO W/SCOPE: CPT

## 2022-11-08 PROCEDURE — 87205 SMEAR GRAM STAIN: CPT

## 2022-11-08 PROCEDURE — 86803 HEPATITIS C AB TEST: CPT

## 2022-11-08 PROCEDURE — 82947 ASSAY GLUCOSE BLOOD QUANT: CPT

## 2022-11-08 PROCEDURE — 82570 ASSAY OF URINE CREATININE: CPT

## 2022-11-08 PROCEDURE — 85025 COMPLETE CBC W/AUTO DIFF WBC: CPT

## 2022-11-08 PROCEDURE — 94640 AIRWAY INHALATION TREATMENT: CPT

## 2022-11-08 PROCEDURE — 87186 SC STD MICRODIL/AGAR DIL: CPT

## 2022-11-08 PROCEDURE — 85018 HEMOGLOBIN: CPT

## 2022-11-08 PROCEDURE — 74177 CT ABD & PELVIS W/CONTRAST: CPT

## 2022-11-08 PROCEDURE — 83735 ASSAY OF MAGNESIUM: CPT

## 2022-11-08 PROCEDURE — 83605 ASSAY OF LACTIC ACID: CPT

## 2022-11-08 PROCEDURE — 82962 GLUCOSE BLOOD TEST: CPT

## 2022-11-08 PROCEDURE — 88304 TISSUE EXAM BY PATHOLOGIST: CPT

## 2022-11-08 PROCEDURE — 87075 CULTR BACTERIA EXCEPT BLOOD: CPT

## 2022-11-08 PROCEDURE — 84132 ASSAY OF SERUM POTASSIUM: CPT

## 2022-11-08 PROCEDURE — 88307 TISSUE EXAM BY PATHOLOGIST: CPT

## 2022-11-08 PROCEDURE — 85027 COMPLETE CBC AUTOMATED: CPT

## 2022-11-08 PROCEDURE — 36573 INSJ PICC RS&I 5 YR+: CPT

## 2022-11-08 PROCEDURE — 86850 RBC ANTIBODY SCREEN: CPT

## 2022-11-08 PROCEDURE — P9045: CPT

## 2022-11-08 PROCEDURE — U0003: CPT

## 2022-11-08 PROCEDURE — 84295 ASSAY OF SERUM SODIUM: CPT

## 2022-11-08 PROCEDURE — 85730 THROMBOPLASTIN TIME PARTIAL: CPT

## 2022-11-08 PROCEDURE — 83690 ASSAY OF LIPASE: CPT

## 2022-11-08 PROCEDURE — C2617: CPT

## 2022-11-08 PROCEDURE — 82248 BILIRUBIN DIRECT: CPT

## 2022-11-08 PROCEDURE — 87070 CULTURE OTHR SPECIMN AEROBIC: CPT

## 2022-11-08 PROCEDURE — 88305 TISSUE EXAM BY PATHOLOGIST: CPT

## 2022-11-08 PROCEDURE — 36415 COLL VENOUS BLD VENIPUNCTURE: CPT

## 2022-11-08 PROCEDURE — 80048 BASIC METABOLIC PNL TOTAL CA: CPT

## 2022-11-08 PROCEDURE — 87086 URINE CULTURE/COLONY COUNT: CPT

## 2022-11-08 PROCEDURE — 97116 GAIT TRAINING THERAPY: CPT

## 2022-11-08 PROCEDURE — 82330 ASSAY OF CALCIUM: CPT

## 2022-11-08 PROCEDURE — C1889: CPT

## 2022-11-08 PROCEDURE — 99285 EMERGENCY DEPT VISIT HI MDM: CPT

## 2022-11-08 PROCEDURE — U0005: CPT

## 2022-11-08 PROCEDURE — 84478 ASSAY OF TRIGLYCERIDES: CPT

## 2022-11-08 PROCEDURE — 86901 BLOOD TYPING SEROLOGIC RH(D): CPT

## 2022-11-08 PROCEDURE — 74019 RADEX ABDOMEN 2 VIEWS: CPT

## 2022-11-08 PROCEDURE — 97530 THERAPEUTIC ACTIVITIES: CPT

## 2022-11-08 PROCEDURE — 88309 TISSUE EXAM BY PATHOLOGIST: CPT

## 2022-11-08 PROCEDURE — 85610 PROTHROMBIN TIME: CPT

## 2022-11-08 PROCEDURE — 97162 PT EVAL MOD COMPLEX 30 MIN: CPT

## 2022-11-08 PROCEDURE — 71045 X-RAY EXAM CHEST 1 VIEW: CPT

## 2022-11-08 PROCEDURE — 77012 CT SCAN FOR NEEDLE BIOPSY: CPT

## 2022-11-08 PROCEDURE — 74176 CT ABD & PELVIS W/O CONTRAST: CPT

## 2022-11-08 PROCEDURE — 86900 BLOOD TYPING SEROLOGIC ABO: CPT

## 2022-11-08 PROCEDURE — 74018 RADEX ABDOMEN 1 VIEW: CPT

## 2022-11-08 PROCEDURE — 76380 CAT SCAN FOLLOW-UP STUDY: CPT

## 2022-11-08 PROCEDURE — 84100 ASSAY OF PHOSPHORUS: CPT

## 2022-11-08 PROCEDURE — 87635 SARS-COV-2 COVID-19 AMP PRB: CPT

## 2022-11-08 PROCEDURE — 10160 PNXR ASPIR ABSC HMTMA BULLA: CPT

## 2022-12-10 ENCOUNTER — NON-APPOINTMENT (OUTPATIENT)
Age: 67
End: 2022-12-10

## 2022-12-19 ENCOUNTER — APPOINTMENT (OUTPATIENT)
Dept: UROLOGY | Facility: CLINIC | Age: 67
End: 2022-12-19

## 2022-12-19 VITALS — HEART RATE: 80 BPM | DIASTOLIC BLOOD PRESSURE: 79 MMHG | TEMPERATURE: 97.5 F | SYSTOLIC BLOOD PRESSURE: 109 MMHG

## 2022-12-19 DIAGNOSIS — N32.1 VESICOINTESTINAL FISTULA: ICD-10-CM

## 2022-12-19 PROCEDURE — 99024 POSTOP FOLLOW-UP VISIT: CPT

## 2022-12-19 NOTE — ASSESSMENT
[FreeTextEntry1] : 66yo male with advanced colon cancer invading bladder with large symptomatic colovesical fistula s/p colon and colovesical fistula resection with complex bladder closure, bilateral ureteral stents 10/3\par \par Stents were removed 1 month ago\par \par Doing very well without complaints\par F/u prn

## 2022-12-19 NOTE — REVIEW OF SYSTEMS
[Feeling Tired] : feeling tired [Fever] : no fever [Chills] : no chills [Abdominal Pain] : no abdominal pain

## 2022-12-19 NOTE — HISTORY OF PRESENT ILLNESS
[FreeTextEntry1] : 10/17/22 Postop followup. Patient underwent exlap with colon resection and resection of colovesical fistula, bladder diverticulum, removal of bladder stones and bilateral ureteral stents 10/3/22. ROSA output was high with high ROSA creatinine consistent with urine leak, discharged home with Degroot and ROSA to gravity drainage. ROSA has had minimal output since discharge. C/o fatigue. Denies fever/chills/abdominal pain/wound problems. \par \par 12/19/22 here for f/u. Voiding well without complaints. Undergoing IO therapy. No stoma issues.  [Urinary Urgency] : no urinary urgency [Urinary Frequency] : no urinary frequency [Nocturia] : no nocturia [Dysuria] : no dysuria [Hematuria - Gross] : no gross hematuria [None] : None

## 2023-01-01 NOTE — H&P ADULT - NSICDXPASTMEDICALHX_GEN_ALL_CORE_FT
PAST MEDICAL HISTORY:  Cystitis     H/O ascites     High cholesterol     History of asthma     History of diverticulitis     Hypertension     Intestinal obstruction     Malignant neoplasm of hepatic flexure     Vesicointestinal fistula     
10 m fem without significant pmhx brought to ED by mom for eval of fever.    States baby fell backward off uncle's lap 1 week ago and struck back of head on hard floor; no LOC and the baby has been acting normally without change in mental status since then. However mom is concerned that baby has had a few episodes (up to 4) of vomiting each day for the past 4-5 days.  Says baby felt "warm" like she had fever tonight, so she brought her directly to ED.  Didn't check temp or give anything for fever.  Mom noticed mild nonproductive cough in past day, no difficulty breathing.      PMHx none  PSHx none  Meds none  NKDA  Born full term,   Vaccines UTD

## 2023-02-01 ENCOUNTER — OUTPATIENT (OUTPATIENT)
Dept: OUTPATIENT SERVICES | Facility: HOSPITAL | Age: 68
LOS: 1 days | End: 2023-02-01
Payer: COMMERCIAL

## 2023-02-01 ENCOUNTER — APPOINTMENT (OUTPATIENT)
Dept: NUCLEAR MEDICINE | Facility: HOSPITAL | Age: 68
End: 2023-02-01
Payer: MEDICARE

## 2023-02-01 DIAGNOSIS — Z90.49 ACQUIRED ABSENCE OF OTHER SPECIFIED PARTS OF DIGESTIVE TRACT: Chronic | ICD-10-CM

## 2023-02-01 DIAGNOSIS — Z98.89 OTHER SPECIFIED POSTPROCEDURAL STATES: Chronic | ICD-10-CM

## 2023-02-01 DIAGNOSIS — Z98.890 OTHER SPECIFIED POSTPROCEDURAL STATES: Chronic | ICD-10-CM

## 2023-02-01 DIAGNOSIS — N32.1 VESICOINTESTINAL FISTULA: Chronic | ICD-10-CM

## 2023-02-01 DIAGNOSIS — Z96.0 PRESENCE OF UROGENITAL IMPLANTS: Chronic | ICD-10-CM

## 2023-02-01 LAB — GLUCOSE BLDC GLUCOMTR-MCNC: 94 MG/DL — SIGNIFICANT CHANGE UP (ref 70–99)

## 2023-02-01 PROCEDURE — 82962 GLUCOSE BLOOD TEST: CPT

## 2023-02-01 PROCEDURE — A9552: CPT

## 2023-02-01 PROCEDURE — 78815 PET IMAGE W/CT SKULL-THIGH: CPT | Mod: 26,PS

## 2023-02-01 PROCEDURE — 78815 PET IMAGE W/CT SKULL-THIGH: CPT

## 2023-05-25 NOTE — PATIENT PROFILE ADULT - NSPROPTRIGHTCAREGIVER_GEN_A_NUR
[TextBox_4] : Had recent ct chest\par \par Pers. diff with HTN diff. to control\par Not talking surgery yet.\par \par Occ cough.\par Some ROOJ stairs. \par Very fatigued. \par No recent respiratory infections.\par on breo \par no  proair inhaler use\par \par Going to [facility] next week. \par \par \par  no

## 2023-06-05 ENCOUNTER — OUTPATIENT (OUTPATIENT)
Dept: OUTPATIENT SERVICES | Facility: HOSPITAL | Age: 68
LOS: 1 days | End: 2023-06-05
Payer: COMMERCIAL

## 2023-06-05 ENCOUNTER — APPOINTMENT (OUTPATIENT)
Dept: NUCLEAR MEDICINE | Facility: HOSPITAL | Age: 68
End: 2023-06-05
Payer: COMMERCIAL

## 2023-06-05 DIAGNOSIS — N32.1 VESICOINTESTINAL FISTULA: Chronic | ICD-10-CM

## 2023-06-05 DIAGNOSIS — Z98.89 OTHER SPECIFIED POSTPROCEDURAL STATES: Chronic | ICD-10-CM

## 2023-06-05 DIAGNOSIS — Z96.0 PRESENCE OF UROGENITAL IMPLANTS: Chronic | ICD-10-CM

## 2023-06-05 DIAGNOSIS — Z98.890 OTHER SPECIFIED POSTPROCEDURAL STATES: Chronic | ICD-10-CM

## 2023-06-05 DIAGNOSIS — Z90.49 ACQUIRED ABSENCE OF OTHER SPECIFIED PARTS OF DIGESTIVE TRACT: Chronic | ICD-10-CM

## 2023-06-05 LAB — GLUCOSE BLDC GLUCOMTR-MCNC: 88 MG/DL — SIGNIFICANT CHANGE UP (ref 70–99)

## 2023-06-05 PROCEDURE — 78815 PET IMAGE W/CT SKULL-THIGH: CPT | Mod: 26,PS

## 2023-06-05 PROCEDURE — 82962 GLUCOSE BLOOD TEST: CPT

## 2023-06-05 PROCEDURE — A9552: CPT

## 2023-06-05 PROCEDURE — 78815 PET IMAGE W/CT SKULL-THIGH: CPT

## 2023-06-20 NOTE — H&P ADULT - NSHPROSALLOTHERNEGRD_GEN_ALL_CORE
Jenny Benavides M.D.  [facility] [address], [facility]  Office : [phone], Fax: [phone] OFFICE VISIT NOTE  Date of Visit:  2023 4:14 PM    Patient Information:  Name:  Chanel Alexis  :  1952  Age:  69 y.o. Gender:  female      Ms. Theodore Kunz is here today for follow-up of psoriatic arthritis and muscle spasms. Last visit: 2023       History of Present Illness: On talking to the patient today she states that she has not had any cough, congestion, fever or chills. She seems to be very agitated secondary to not being able to get up and go about her work at home,  says she has also had issues with her memory. Patient has also experienced some abdominal pain as well. With regard to her memory problems and her abdominal pain she was instructed to call her PCP and go in to be evaluated. More recently she has had easy bruising involving her legs. She states that since she last saw me she has not had the need to skip administering the Humira for any reason. Her current joint complaints are as mentioned below. Since the last visit, patient is feeling \"worse\". Pain: 8/10  Location:  Some lower back pain and had an MRI of the L spine yesterday. Some left groin pain. Some pain and swelling of the PIP joints worse in the right than the left hand. Some right shoulder pain. Some para spinal muscle pain. Some neck stiffness with pain with neck ROM. Some mid back and shoulder blade pain. Quality:  Sharp to throbbing pain. Modifying Factors:  End of the day the pain is the worst.   Associated Symptoms:  Needs help with her ADL's. Some LE weakness and UE weakness. Intermittent pain with tingling but no numbness from the right hip to the toes and from the right shoulder to the fingertips. No pain, tingling and numbness down the left arm or the left leg.      DMARD/Biologic 2023   AM Stiffness
All other review of systems negative, except as noted in HPI

## 2023-07-17 ENCOUNTER — OUTPATIENT (OUTPATIENT)
Dept: OUTPATIENT SERVICES | Facility: HOSPITAL | Age: 68
LOS: 1 days | End: 2023-07-17
Payer: COMMERCIAL

## 2023-07-17 ENCOUNTER — APPOINTMENT (OUTPATIENT)
Dept: RADIOLOGY | Facility: HOSPITAL | Age: 68
End: 2023-07-17
Payer: MEDICARE

## 2023-07-17 DIAGNOSIS — Z98.89 OTHER SPECIFIED POSTPROCEDURAL STATES: Chronic | ICD-10-CM

## 2023-07-17 DIAGNOSIS — Z96.0 PRESENCE OF UROGENITAL IMPLANTS: Chronic | ICD-10-CM

## 2023-07-17 DIAGNOSIS — Z98.890 OTHER SPECIFIED POSTPROCEDURAL STATES: Chronic | ICD-10-CM

## 2023-07-17 DIAGNOSIS — N32.1 VESICOINTESTINAL FISTULA: Chronic | ICD-10-CM

## 2023-07-17 DIAGNOSIS — Z90.49 ACQUIRED ABSENCE OF OTHER SPECIFIED PARTS OF DIGESTIVE TRACT: Chronic | ICD-10-CM

## 2023-07-17 PROCEDURE — 74270 X-RAY XM COLON 1CNTRST STD: CPT | Mod: 26

## 2023-07-17 PROCEDURE — 74270 X-RAY XM COLON 1CNTRST STD: CPT

## 2023-08-02 ENCOUNTER — APPOINTMENT (OUTPATIENT)
Dept: INTERNAL MEDICINE | Facility: CLINIC | Age: 68
End: 2023-08-02
Payer: COMMERCIAL

## 2023-08-02 VITALS
BODY MASS INDEX: 24.25 KG/M2 | TEMPERATURE: 98.1 F | DIASTOLIC BLOOD PRESSURE: 80 MMHG | HEIGHT: 68 IN | HEART RATE: 71 BPM | SYSTOLIC BLOOD PRESSURE: 122 MMHG | OXYGEN SATURATION: 98 % | RESPIRATION RATE: 14 BRPM | WEIGHT: 160 LBS

## 2023-08-02 VITALS
TEMPERATURE: 97.8 F | BODY MASS INDEX: 24.25 KG/M2 | OXYGEN SATURATION: 95 % | SYSTOLIC BLOOD PRESSURE: 194 MMHG | HEIGHT: 68 IN | HEART RATE: 68 BPM | WEIGHT: 160 LBS | DIASTOLIC BLOOD PRESSURE: 67 MMHG | RESPIRATION RATE: 14 BRPM

## 2023-08-02 DIAGNOSIS — Z01.818 ENCOUNTER FOR OTHER PREPROCEDURAL EXAMINATION: ICD-10-CM

## 2023-08-02 DIAGNOSIS — Z93.2 ILEOSTOMY STATUS: ICD-10-CM

## 2023-08-02 DIAGNOSIS — C19 MALIGNANT NEOPLASM OF RECTOSIGMOID JUNCTION: ICD-10-CM

## 2023-08-02 PROCEDURE — 36415 COLL VENOUS BLD VENIPUNCTURE: CPT

## 2023-08-02 PROCEDURE — 99213 OFFICE O/P EST LOW 20 MIN: CPT | Mod: 25

## 2023-08-02 PROCEDURE — 93000 ELECTROCARDIOGRAM COMPLETE: CPT

## 2023-08-02 RX ORDER — LOSARTAN POTASSIUM 50 MG/1
50 TABLET, FILM COATED ORAL
Qty: 90 | Refills: 0 | Status: DISCONTINUED | COMMUNITY
Start: 2022-12-11 | End: 2023-08-02

## 2023-08-02 RX ORDER — ZOSTER VACCINE RECOMBINANT, ADJUVANTED 50 MCG/0.5
50 KIT INTRAMUSCULAR
Qty: 1 | Refills: 0 | Status: DISCONTINUED | OUTPATIENT
Start: 2018-03-16 | End: 2023-08-02

## 2023-08-02 NOTE — HISTORY OF PRESENT ILLNESS
[No Pertinent Cardiac History] : no history of aortic stenosis, atrial fibrillation, coronary artery disease, recent myocardial infarction, or implantable device/pacemaker [No Pertinent Pulmonary History] : no history of asthma, COPD, sleep apnea, or smoking [Asthma] : asthma [No Adverse Anesthesia Reaction] : no adverse anesthesia reaction in self or family member [(Patient denies any chest pain, claudication, dyspnea on exertion, orthopnea, palpitations or syncope)] : Patient denies any chest pain, claudication, dyspnea on exertion, orthopnea, palpitations or syncope [Good (7-10 METs)] : Good (7-10 METs) [Chronic Anticoagulation] : no chronic anticoagulation [Chronic Kidney Disease] : no chronic kidney disease [Diabetes] : no diabetes [FreeTextEntry1] : reversal of illeostomy  [FreeTextEntry2] : 8/7/23 [FreeTextEntry3] : Dr. Juan Jose Beckett  [FreeTextEntry4] : 68yoM here for pre-op  fax: [phone]  follows with cancer dr for immunotherapy  -Novocodey -keytruda 1x/3 weeks  -they are coordinating regarding the timing of the next dose with surgery

## 2023-08-03 DIAGNOSIS — R79.89 OTHER SPECIFIED ABNORMAL FINDINGS OF BLOOD CHEMISTRY: ICD-10-CM

## 2023-08-03 LAB
ALBUMIN SERPL ELPH-MCNC: 4.7 G/DL
ALP BLD-CCNC: 117 U/L
ALT SERPL-CCNC: 23 U/L
ANION GAP SERPL CALC-SCNC: 14 MMOL/L
APTT BLD: 31.2 SEC
AST SERPL-CCNC: 18 U/L
BILIRUB SERPL-MCNC: 0.3 MG/DL
BUN SERPL-MCNC: 28 MG/DL
CALCIUM SERPL-MCNC: 10.5 MG/DL
CHLORIDE SERPL-SCNC: 106 MMOL/L
CO2 SERPL-SCNC: 20 MMOL/L
CREAT SERPL-MCNC: 1.5 MG/DL
EGFR: 50 ML/MIN/1.73M2
GLUCOSE SERPL-MCNC: 78 MG/DL
INR PPP: 0.86 RATIO
POTASSIUM SERPL-SCNC: 4.7 MMOL/L
PROT SERPL-MCNC: 6.9 G/DL
PT BLD: 9.8 SEC
SODIUM SERPL-SCNC: 140 MMOL/L

## 2023-08-04 VITALS
SYSTOLIC BLOOD PRESSURE: 120 MMHG | OXYGEN SATURATION: 96 % | TEMPERATURE: 97 F | HEART RATE: 79 BPM | RESPIRATION RATE: 16 BRPM | HEIGHT: 68 IN | WEIGHT: 153.66 LBS | DIASTOLIC BLOOD PRESSURE: 80 MMHG

## 2023-08-04 RX ORDER — ALBUTEROL 90 UG/1
0 AEROSOL, METERED ORAL
Qty: 0 | Refills: 0 | DISCHARGE

## 2023-08-04 NOTE — PATIENT PROFILE ADULT - FUNCTIONAL ASSESSMENT - BASIC MOBILITY 1.
Spoke with pt and informed him bp was elevated las ov. Per pt his bp readings are still in the 140s. Explained our goal is to be lower then 140. He will continue to monitor bp at home and f/u with pcp in December.    4 = No assist / stand by assistance

## 2023-08-06 ENCOUNTER — TRANSCRIPTION ENCOUNTER (OUTPATIENT)
Age: 68
End: 2023-08-06

## 2023-08-07 ENCOUNTER — TRANSCRIPTION ENCOUNTER (OUTPATIENT)
Age: 68
End: 2023-08-07

## 2023-08-07 ENCOUNTER — INPATIENT (INPATIENT)
Facility: HOSPITAL | Age: 68
LOS: 3 days | Discharge: HOME | DRG: 331 | End: 2023-08-11
Attending: COLON & RECTAL SURGERY | Admitting: COLON & RECTAL SURGERY
Payer: COMMERCIAL

## 2023-08-07 DIAGNOSIS — Z90.49 ACQUIRED ABSENCE OF OTHER SPECIFIED PARTS OF DIGESTIVE TRACT: Chronic | ICD-10-CM

## 2023-08-07 DIAGNOSIS — Z98.890 OTHER SPECIFIED POSTPROCEDURAL STATES: Chronic | ICD-10-CM

## 2023-08-07 DIAGNOSIS — Z85.038 PERSONAL HISTORY OF OTHER MALIGNANT NEOPLASM OF LARGE INTESTINE: ICD-10-CM

## 2023-08-07 DIAGNOSIS — N32.1 VESICOINTESTINAL FISTULA: Chronic | ICD-10-CM

## 2023-08-07 DIAGNOSIS — I10 ESSENTIAL (PRIMARY) HYPERTENSION: ICD-10-CM

## 2023-08-07 DIAGNOSIS — Z98.89 OTHER SPECIFIED POSTPROCEDURAL STATES: Chronic | ICD-10-CM

## 2023-08-07 DIAGNOSIS — Z43.2 ENCOUNTER FOR ATTENTION TO ILEOSTOMY: ICD-10-CM

## 2023-08-07 DIAGNOSIS — Z90.49 ACQUIRED ABSENCE OF OTHER SPECIFIED PARTS OF DIGESTIVE TRACT: ICD-10-CM

## 2023-08-07 DIAGNOSIS — E78.00 PURE HYPERCHOLESTEROLEMIA, UNSPECIFIED: ICD-10-CM

## 2023-08-07 DIAGNOSIS — Z96.0 PRESENCE OF UROGENITAL IMPLANTS: Chronic | ICD-10-CM

## 2023-08-07 DIAGNOSIS — Z87.891 PERSONAL HISTORY OF NICOTINE DEPENDENCE: ICD-10-CM

## 2023-08-07 DIAGNOSIS — J45.909 UNSPECIFIED ASTHMA, UNCOMPLICATED: ICD-10-CM

## 2023-08-07 PROCEDURE — 88304 TISSUE EXAM BY PATHOLOGIST: CPT | Mod: 26

## 2023-08-07 DEVICE — TISSEEL 4ML: Type: IMPLANTABLE DEVICE | Status: FUNCTIONAL

## 2023-08-07 DEVICE — STAPLER ETHICON PROXIMATE 75MM WITH BLUE RELOAD: Type: IMPLANTABLE DEVICE | Status: FUNCTIONAL

## 2023-08-07 DEVICE — STAPLER LINEAR: Type: IMPLANTABLE DEVICE | Status: FUNCTIONAL

## 2023-08-07 DEVICE — STAPLER ETHICON PROXIMATE RELOAD 75MM BLUE: Type: IMPLANTABLE DEVICE | Status: FUNCTIONAL

## 2023-08-07 RX ORDER — HEPARIN SODIUM 5000 [USP'U]/ML
5000 INJECTION INTRAVENOUS; SUBCUTANEOUS ONCE
Refills: 0 | Status: DISCONTINUED | OUTPATIENT
Start: 2023-08-07 | End: 2023-08-07

## 2023-08-07 RX ORDER — SODIUM CHLORIDE 9 MG/ML
1000 INJECTION, SOLUTION INTRAVENOUS
Refills: 0 | Status: DISCONTINUED | OUTPATIENT
Start: 2023-08-07 | End: 2023-08-08

## 2023-08-07 RX ORDER — OXYCODONE HYDROCHLORIDE 5 MG/1
5 TABLET ORAL EVERY 4 HOURS
Refills: 0 | Status: DISCONTINUED | OUTPATIENT
Start: 2023-08-07 | End: 2023-08-11

## 2023-08-07 RX ORDER — OXYCODONE HYDROCHLORIDE 5 MG/1
2.5 TABLET ORAL EVERY 4 HOURS
Refills: 0 | Status: DISCONTINUED | OUTPATIENT
Start: 2023-08-07 | End: 2023-08-11

## 2023-08-07 RX ORDER — HEPARIN SODIUM 5000 [USP'U]/ML
5000 INJECTION INTRAVENOUS; SUBCUTANEOUS EVERY 8 HOURS
Refills: 0 | Status: DISCONTINUED | OUTPATIENT
Start: 2023-08-07 | End: 2023-08-11

## 2023-08-07 RX ORDER — ACETAMINOPHEN 500 MG
1000 TABLET ORAL ONCE
Refills: 0 | Status: DISCONTINUED | OUTPATIENT
Start: 2023-08-07 | End: 2023-08-07

## 2023-08-07 RX ORDER — HYDROMORPHONE HYDROCHLORIDE 2 MG/ML
0.25 INJECTION INTRAMUSCULAR; INTRAVENOUS; SUBCUTANEOUS EVERY 4 HOURS
Refills: 0 | Status: DISCONTINUED | OUTPATIENT
Start: 2023-08-07 | End: 2023-08-11

## 2023-08-07 RX ORDER — ACETAMINOPHEN 500 MG
1000 TABLET ORAL EVERY 6 HOURS
Refills: 0 | Status: DISCONTINUED | OUTPATIENT
Start: 2023-08-07 | End: 2023-08-09

## 2023-08-07 RX ADMIN — SODIUM CHLORIDE 40 MILLILITER(S): 9 INJECTION, SOLUTION INTRAVENOUS at 11:32

## 2023-08-07 RX ADMIN — HEPARIN SODIUM 5000 UNIT(S): 5000 INJECTION INTRAVENOUS; SUBCUTANEOUS at 21:56

## 2023-08-07 RX ADMIN — HEPARIN SODIUM 5000 UNIT(S): 5000 INJECTION INTRAVENOUS; SUBCUTANEOUS at 14:37

## 2023-08-07 RX ADMIN — Medication 1000 MILLIGRAM(S): at 14:36

## 2023-08-07 NOTE — BRIEF OPERATIVE NOTE - NSICDXBRIEFOPLAUNCH_GEN_ALL_CORE
PRE-PROCEDURE NOTE    CHIEFCOMPLAINT / REASON FOR PROCEDURE:  Rectal bleeding    PERTINENT HISTORY   Patient complains of bleed per rectum. Previous colonoscopy- None. No family history of colon polyps or colon cancer.    No past medical history on file.  Past Surgical History:   Procedure Laterality Date     ABDOMEN SURGERY      hernia surgery X5     BIOPSY BREAST NEEDLE LOCALIZATION Left 1/30/2017    Procedure: BIOPSY BREAST NEEDLE LOCALIZATION;  Surgeon: Sita Rushing MD;  Location: [facility]     BREAST SURGERY Left 09/28/2016    biopsy     BREAST SURGERY Left 09/13/2016    biopsy     GYN SURGERY  2004    tubal ligation     ORTHOPEDIC SURGERY Left [id number]    left knee medial retinacular repair, VMO advancement, and reapir of partial tear of quadriceps tendon     ORTHOPEDIC SURGERY Left 04/18/2017    left knee manipulation       Other:  None  Bleeding tendencies: No     Relevant Family History:  None     Relevant Social History:  None     10 point ROS of systems including Constitutional, Eyes, Respiratory, Cardiovascular, Gastroenterology, Genitourinary, Integumentary, Muscularskeletal, Psychiatric were all negative except for pertinent positives noted in my HPI.        ALLERGIES/SENSITIVITIES:   Allergies   Allergen Reactions     Peanuts [Peanut-Derived]      Eyes swell     Trees Other (See Comments)     Tree sap makes her eyes swell shut        CURRENT MEDICATIONS:    No current facility-administered medications on file prior to encounter.   topiramate (TOPAMAX) 50 MG tablet, Take 1 tablet (50 mg) by mouth 2 times daily          PRE-SEDATION ASSESSMENT:    LUNGS:  CTA B/L, no wheezing or crackles.  Heart & CV:  RRR no murmur.  Intact distal pulses, good cap refill.    Comment(s):      IMPRESSION: 44 year old female in need of diagnostic colonoscopy for bleeding    PLAN:  I discussed diagnostic colonoscopy with the patient. Anesthesia coverage requested.    Naeem Camarena MD   <--- Click to Launch ICDx for PreOp, PostOp and Procedure

## 2023-08-07 NOTE — BRIEF OPERATIVE NOTE - NSICDXBRIEFPROCEDURE_GEN_ALL_CORE_FT
PROCEDURES:  Creation, revision, or reversal of loop colostomy or ileostomy 07-Aug-2023 09:26:19  Mukesh Davey

## 2023-08-07 NOTE — H&P ADULT - ASSESSMENT
68 yo M with PMH HTN, HLD, asthma, diverticulitis with colovescicular fistula and colon cancer s/p R hemicolectomy (2016), s/p exploratory laparotomy, segmental colon resection, takedown of colovesicular fistula, partial cystectomy, bladder diverticulectomy, colorectal anastomosis, loop ileostomy creation (10/06/22) complicated by a bladder leak (elevated Cr in ROSA). Patient returned with small bowel obstruction requiring surgery and underwent exploratory laparotomy, SBR, partial omentectomy on 10/24/22.     He is here today for elective reversal of loop ileostomy.

## 2023-08-07 NOTE — BRIEF OPERATIVE NOTE - NSICDXBRIEFPREOP_GEN_ALL_CORE_FT
PRE-OP DIAGNOSIS:  Colon cancer 07-Aug-2023 09:26:43  Mukesh Davey  Ileostomy present 07-Aug-2023 09:26:58  Mukesh Davey

## 2023-08-07 NOTE — BRIEF OPERATIVE NOTE - OPERATION/FINDINGS
Patient prepped and draped in the usual sterile fashion after induction of general anesthesia. We began by making a skin incision bordering the ileostomy. The incision was then dissected down to deeper tissues until this loop of small bowel was completely released from the fascia. The mesentery of this segment was ligated using ligasure device. The small bowel proximal and distal to the stoma was divided with KENNY stapler load and passed off as specimen. The two loops were then aligned and an enterotomy was made on each limb. Each limb of the KENNY stapler was passed into each limb of the small bowel and fired. The common channel was closed with a TA stapler. Hemostasis was secured. The fascia was then closed using 1 PDS stitch in running fashion. The wound was irrigated and packed with guaze.

## 2023-08-07 NOTE — PRE-ANESTHESIA EVALUATION ADULT - NSANTHPEFT_GEN_ALL_CORE
denies h/o cardiopulm dz, CVA, DM, SZ d/o, BRIAN, GERD, DVT/PE, kidney dz, liver dz, blood dyscrasias

## 2023-08-07 NOTE — H&P ADULT - HISTORY OF PRESENT ILLNESS
68 yo M with PMH HTN, HLD, asthma, diverticulitis with colovescicular fistula and colon cancer s/p R hemicolectomy (2016) and now s/p exploratory laparotomy, segmental colon resection, resection of colovesicular fistula, partial cystectomy, bladder diverticulectomy, colorectal anastomosis, loop ileostomy creation (10/06/22) complicated by a bladder leak (elevated Cr in ROSA). Patient returned with small bowel obstruction requiring surgery and underwent exploratory laparotomy, SBR, partial omentectomy on 10/24/22. He is here today for elective reversal of loop ileostomy.     PMH: HTN, HLD, asthma, diverticulitis with colorvesicular fistula and colon  cancer  PSH: R hemicolectomy (2016), left inguinal hernia repair  Fam Hx: father - prostate, bladder, liver, kidney cancer; mother - breast  cancer; sister - breast cancer; sister - MM  Soc: on and off smoker for 40 years (1 pack a day, hasn't smoked for a couple  of months); drinks 2 glasses of wine a night, no recreational drugs, work-  pharmacist

## 2023-08-07 NOTE — PRE-ANESTHESIA EVALUATION ADULT - NSANTHPMHFT_GEN_ALL_CORE
denies h/o CAD/MI, CVA, DM, SZ d/o, BRIAN, GERD, DVT/PE, kidney dz, liver dz, blood dyscrasias  mild asthma, prn albuterol use  HTN listed in chart but pt hasn't required any medication since on chemo

## 2023-08-07 NOTE — BRIEF OPERATIVE NOTE - NSICDXBRIEFPOSTOP_GEN_ALL_CORE_FT
POST-OP DIAGNOSIS:  Colon cancer 07-Aug-2023 09:27:15  Mukesh Davey  Status post reversal of ileostomy 07-Aug-2023 09:27:28  Mukesh Davey

## 2023-08-08 ENCOUNTER — TRANSCRIPTION ENCOUNTER (OUTPATIENT)
Age: 68
End: 2023-08-08

## 2023-08-08 LAB
ANION GAP SERPL CALC-SCNC: 9 MMOL/L — SIGNIFICANT CHANGE UP (ref 5–17)
ANISOCYTOSIS BLD QL: SLIGHT — SIGNIFICANT CHANGE UP
BASOPHILS # BLD AUTO: 0.13 K/UL — SIGNIFICANT CHANGE UP (ref 0–0.2)
BASOPHILS NFR BLD AUTO: 0.9 % — SIGNIFICANT CHANGE UP (ref 0–2)
BUN SERPL-MCNC: 36 MG/DL — HIGH (ref 7–23)
BURR CELLS BLD QL SMEAR: PRESENT — SIGNIFICANT CHANGE UP
CALCIUM SERPL-MCNC: 10.1 MG/DL — SIGNIFICANT CHANGE UP (ref 8.4–10.5)
CHLORIDE SERPL-SCNC: 102 MMOL/L — SIGNIFICANT CHANGE UP (ref 96–108)
CO2 SERPL-SCNC: 23 MMOL/L — SIGNIFICANT CHANGE UP (ref 22–31)
CREAT SERPL-MCNC: 1.59 MG/DL — HIGH (ref 0.5–1.3)
EGFR: 47 ML/MIN/1.73M2 — LOW
EOSINOPHIL # BLD AUTO: 0 K/UL — SIGNIFICANT CHANGE UP (ref 0–0.5)
EOSINOPHIL NFR BLD AUTO: 0 % — SIGNIFICANT CHANGE UP (ref 0–6)
GIANT PLATELETS BLD QL SMEAR: PRESENT — SIGNIFICANT CHANGE UP
GLUCOSE SERPL-MCNC: 115 MG/DL — HIGH (ref 70–99)
HCT VFR BLD CALC: 42.3 % — SIGNIFICANT CHANGE UP (ref 39–50)
HGB BLD-MCNC: 14.8 G/DL — SIGNIFICANT CHANGE UP (ref 13–17)
LYMPHOCYTES # BLD AUTO: 0.87 K/UL — LOW (ref 1–3.3)
LYMPHOCYTES # BLD AUTO: 6.1 % — LOW (ref 13–44)
MAGNESIUM SERPL-MCNC: 1.8 MG/DL — SIGNIFICANT CHANGE UP (ref 1.6–2.6)
MANUAL SMEAR VERIFICATION: SIGNIFICANT CHANGE UP
MCHC RBC-ENTMCNC: 32.2 PG — SIGNIFICANT CHANGE UP (ref 27–34)
MCHC RBC-ENTMCNC: 35 GM/DL — SIGNIFICANT CHANGE UP (ref 32–36)
MCV RBC AUTO: 92.2 FL — SIGNIFICANT CHANGE UP (ref 80–100)
MICROCYTES BLD QL: SLIGHT — SIGNIFICANT CHANGE UP
MONOCYTES # BLD AUTO: 1.62 K/UL — HIGH (ref 0–0.9)
MONOCYTES NFR BLD AUTO: 11.3 % — SIGNIFICANT CHANGE UP (ref 2–14)
NEUTROPHILS # BLD AUTO: 11.72 K/UL — HIGH (ref 1.8–7.4)
NEUTROPHILS NFR BLD AUTO: 81.7 % — HIGH (ref 43–77)
OVALOCYTES BLD QL SMEAR: SLIGHT — SIGNIFICANT CHANGE UP
PHOSPHATE SERPL-MCNC: 3.3 MG/DL — SIGNIFICANT CHANGE UP (ref 2.5–4.5)
PLAT MORPH BLD: ABNORMAL
PLATELET # BLD AUTO: 269 K/UL — SIGNIFICANT CHANGE UP (ref 150–400)
POIKILOCYTOSIS BLD QL AUTO: SLIGHT — SIGNIFICANT CHANGE UP
POTASSIUM SERPL-MCNC: 4.2 MMOL/L — SIGNIFICANT CHANGE UP (ref 3.5–5.3)
POTASSIUM SERPL-SCNC: 4.2 MMOL/L — SIGNIFICANT CHANGE UP (ref 3.5–5.3)
RBC # BLD: 4.59 M/UL — SIGNIFICANT CHANGE UP (ref 4.2–5.8)
RBC # FLD: 13.2 % — SIGNIFICANT CHANGE UP (ref 10.3–14.5)
RBC BLD AUTO: ABNORMAL
SODIUM SERPL-SCNC: 134 MMOL/L — LOW (ref 135–145)
SPHEROCYTES BLD QL SMEAR: SLIGHT — SIGNIFICANT CHANGE UP
WBC # BLD: 14.34 K/UL — HIGH (ref 3.8–10.5)
WBC # FLD AUTO: 14.34 K/UL — HIGH (ref 3.8–10.5)

## 2023-08-08 RX ORDER — SODIUM CHLORIDE 9 MG/ML
1000 INJECTION, SOLUTION INTRAVENOUS
Refills: 0 | Status: DISCONTINUED | OUTPATIENT
Start: 2023-08-08 | End: 2023-08-10

## 2023-08-08 RX ORDER — SODIUM CHLORIDE 9 MG/ML
500 INJECTION, SOLUTION INTRAVENOUS ONCE
Refills: 0 | Status: COMPLETED | OUTPATIENT
Start: 2023-08-08 | End: 2023-08-08

## 2023-08-08 RX ADMIN — Medication 1000 MILLIGRAM(S): at 14:35

## 2023-08-08 RX ADMIN — HEPARIN SODIUM 5000 UNIT(S): 5000 INJECTION INTRAVENOUS; SUBCUTANEOUS at 14:35

## 2023-08-08 RX ADMIN — HEPARIN SODIUM 5000 UNIT(S): 5000 INJECTION INTRAVENOUS; SUBCUTANEOUS at 05:42

## 2023-08-08 RX ADMIN — HEPARIN SODIUM 5000 UNIT(S): 5000 INJECTION INTRAVENOUS; SUBCUTANEOUS at 22:11

## 2023-08-08 RX ADMIN — SODIUM CHLORIDE 40 MILLILITER(S): 9 INJECTION, SOLUTION INTRAVENOUS at 07:49

## 2023-08-08 RX ADMIN — Medication 1000 MILLIGRAM(S): at 09:08

## 2023-08-08 RX ADMIN — SODIUM CHLORIDE 1000 MILLILITER(S): 9 INJECTION, SOLUTION INTRAVENOUS at 09:59

## 2023-08-08 NOTE — PHYSICAL THERAPY INITIAL EVALUATION ADULT - GAIT DEVIATIONS NOTED, PT EVAL
decreased anna/increased time in double stance/decreased step length/decreased weight-shifting ability

## 2023-08-08 NOTE — DISCHARGE NOTE PROVIDER - NSDCCPTREATMENT_GEN_ALL_CORE_FT
PRINCIPAL PROCEDURE  Procedure: Creation, revision, or reversal of loop colostomy or ileostomy  Findings and Treatment:

## 2023-08-08 NOTE — DISCHARGE NOTE PROVIDER - NSDCFUADDINST_GEN_ALL_CORE_FT
General Discharge Instructions:  Please resume all regular home medications unless specifically advised not to take a particular medication.   Please get plenty of rest, continue to ambulate several times per day, and drink adequate amounts of fluids. Avoid lifting weights greater than 5-10 lbs until you follow-up with your surgeon, who will instruct you further regarding activity restrictions.    Incision Care:  *Please call your doctor if you have increased pain, swelling, redness, or drainage from the incision site.  *Avoid swimming and baths until your follow-up appointment.  *You may shower, and wash surgical incisions with a mild soap and warm water. Gently pat the area dry    Warning Signs:  Please call your doctor if you experience the following:  *You experience new chest pain, pressure, squeezing or tightness.  *New or worsening cough, shortness of breath, or wheeze.  *If you are vomiting and cannot keep down fluids or your medications.  *You are getting dehydrated due to continued vomiting, diarrhea, or other reasons. Signs of dehydration include dry mouth, rapid heartbeat, or feeling dizzy or faint when standing.  *You see blood or dark/black material when you vomit or have a bowel movement.  *Your pain is not improving within 8-12 hours or is not gone within 24 hours. Call or return immediately if your pain is getting worse, changes location, or moves to your chest or back.  *You have shaking chills, or fever greater than 101.5 degrees Fahrenheit or 38 degrees Celsius.  *Any change in your symptoms, or any new symptoms that concern you.    WOUND CARE:  The wound site is changed everyday with 4x4 wet guaze by a normal saline syringe and place the guaze into the incision with a sterile q-tip, and dry 4x4 gauze on top.     PAIN;   please take tylenol for pain, do not exceed more than 4000mg daily.

## 2023-08-08 NOTE — DISCHARGE NOTE PROVIDER - HOSPITAL COURSE
68 yo M with PMH HTN, HLD, asthma, diverticulitis with colovescicular fistula and colon cancer s/p R hemicolectomy (2016) and now s/p exploratory laparotomy, segmental colon resection, resection of colovesicular fistula, partial cystectomy, bladder diverticulectomy, colorectal anastomosis, loop ileostomy creation (10/06/22) complicated by a bladder leak (elevated Cr in ROSA). Patient returned with small bowel obstruction requiring surgery and underwent exploratory laparotomy, SBR, partial omentectomy on 10/24/22. Pt is now s/p Ileostomy reversal on 8/7. Procedure was uncomplicated. Post op course was uneventful. Pt tolerated PO intake with return of bowel function, voided adequately and remained hemodynamically stable. At time of discharge pt was stable. 66 yo M with PMH HTN, HLD, asthma, diverticulitis with colovescicular fistula and colon cancer s/p R hemicolectomy (2016) and now s/p exploratory laparotomy, segmental colon resection, resection of colovesicular fistula, partial cystectomy, bladder diverticulectomy, colorectal anastomosis, loop ileostomy creation (10/06/22) complicated by a bladder leak (elevated Cr in ROSA). Patient returned with small bowel obstruction requiring surgery and underwent exploratory laparotomy, SBR, partial omentectomy on 10/24/22. Pt is now s/p Ileostomy reversal on 8/7. Procedure was uncomplicated. The wound site is changed everyday with 4x4 wet guaze into the incision, and dry 4x4 gauze on top. Post op course was uneventful. Pt tolerated PO intake with return of bowel function, voided adequately and remained hemodynamically stable. At time of discharge pt was stable to be discharged home with VNS for wound care, patient declined at this time. Patient instructed on wound care.

## 2023-08-08 NOTE — DISCHARGE NOTE PROVIDER - CARE PROVIDER_API CALL
Moreno Zhang  Colon/Rectal Surgery  [address], [address]  [place], [place] [facility]  Phone: [phone]  Fax: [phone]  Follow Up Time:

## 2023-08-08 NOTE — DISCHARGE NOTE PROVIDER - NSDCMRMEDTOKEN_GEN_ALL_CORE_FT
Adapt Barrier Rings 2&quot; #8805: 1 application transdermal once a day   Adapt Ostomy Belt #7300: 1 application transdermal once a day   Adapt Stoma Powder #7906: 1 application transdermal once a day   Albuterol (Eqv-ProAir HFA) 90 mcg/inh inhalation aerosol: inhaled prn, As Needed  Rocael Drainable pouch 2 1/4&quot; #76947: 1 application transdermal once a day   [place] skin Barrier 2 1/4&quot; #66592: 1 application transdermal once a day   Keytruda 25 mg/mL intravenous solution: 200 intravenously  Rolling walker: Rolling Walker    Dx: Deconditioning  simvastatin 40 mg oral tablet: 1 tab(s) orally once a day (at bedtime)  Skin Prep #7917: 1 application transdermal once a day    Albuterol (Eqv-ProAir HFA) 90 mcg/inh inhalation aerosol: inhaled prn, As Needed  simvastatin 40 mg oral tablet: 1 tab(s) orally once a day (at bedtime)

## 2023-08-08 NOTE — PHYSICAL THERAPY INITIAL EVALUATION ADULT - ADDITIONAL COMMENTS
pt lives w/ family in elevator access apt building w/ no stairs to enter. Denies use of DME for ambulation prior to this admission. Denies hx of recent falls. was independent in all ADLs

## 2023-08-08 NOTE — DISCHARGE NOTE PROVIDER - NSDCCPCAREPLAN_GEN_ALL_CORE_FT
PRINCIPAL DISCHARGE DIAGNOSIS  Diagnosis: S/P closure of ileostomy  Assessment and Plan of Treatment: Follow up with Dr. Zhang in 1-2 weeks. Call the office at the number below to schedule your appointment. You may shower; soap and water over incision sites. Do not scrub. Pat dry when done. No tub bathing or swimming until cleared. Keep incision sites out of the sun as scars will darken. Ambulate as tolerated, but no heavy lifting (>10lbs) or strenuous exercise. You may resume should be on low fiber diet. You should be urinating at least 3-4x per day. Call the office if you experience increasing abdominal pain, nausea, vomiting, or temperature >101 F.   You have an open wound, nurses are planned to come to you and assist you with wound management.  Please apply a damp 4x4 gauze with 0.9% NS into the wound and cover it with dry 4x4 gauze and paper tape. Change dressings daily.

## 2023-08-09 LAB
ANION GAP SERPL CALC-SCNC: 8 MMOL/L — SIGNIFICANT CHANGE UP (ref 5–17)
BUN SERPL-MCNC: 23 MG/DL — SIGNIFICANT CHANGE UP (ref 7–23)
CALCIUM SERPL-MCNC: 9.9 MG/DL — SIGNIFICANT CHANGE UP (ref 8.4–10.5)
CHLORIDE SERPL-SCNC: 103 MMOL/L — SIGNIFICANT CHANGE UP (ref 96–108)
CO2 SERPL-SCNC: 25 MMOL/L — SIGNIFICANT CHANGE UP (ref 22–31)
CREAT SERPL-MCNC: 1.2 MG/DL — SIGNIFICANT CHANGE UP (ref 0.5–1.3)
EGFR: 65 ML/MIN/1.73M2 — SIGNIFICANT CHANGE UP
GLUCOSE SERPL-MCNC: 114 MG/DL — HIGH (ref 70–99)
HCT VFR BLD CALC: 42.3 % — SIGNIFICANT CHANGE UP (ref 39–50)
HGB BLD-MCNC: 14.3 G/DL — SIGNIFICANT CHANGE UP (ref 13–17)
MAGNESIUM SERPL-MCNC: 1.9 MG/DL — SIGNIFICANT CHANGE UP (ref 1.6–2.6)
MCHC RBC-ENTMCNC: 31.6 PG — SIGNIFICANT CHANGE UP (ref 27–34)
MCHC RBC-ENTMCNC: 33.8 GM/DL — SIGNIFICANT CHANGE UP (ref 32–36)
MCV RBC AUTO: 93.4 FL — SIGNIFICANT CHANGE UP (ref 80–100)
NRBC # BLD: 0 /100 WBCS — SIGNIFICANT CHANGE UP (ref 0–0)
PHOSPHATE SERPL-MCNC: 2.6 MG/DL — SIGNIFICANT CHANGE UP (ref 2.5–4.5)
PLATELET # BLD AUTO: 252 K/UL — SIGNIFICANT CHANGE UP (ref 150–400)
POTASSIUM SERPL-MCNC: 4.4 MMOL/L — SIGNIFICANT CHANGE UP (ref 3.5–5.3)
POTASSIUM SERPL-SCNC: 4.4 MMOL/L — SIGNIFICANT CHANGE UP (ref 3.5–5.3)
RBC # BLD: 4.53 M/UL — SIGNIFICANT CHANGE UP (ref 4.2–5.8)
RBC # FLD: 13.5 % — SIGNIFICANT CHANGE UP (ref 10.3–14.5)
SODIUM SERPL-SCNC: 136 MMOL/L — SIGNIFICANT CHANGE UP (ref 135–145)
WBC # BLD: 10.96 K/UL — HIGH (ref 3.8–10.5)
WBC # FLD AUTO: 10.96 K/UL — HIGH (ref 3.8–10.5)

## 2023-08-09 RX ORDER — ACETAMINOPHEN 500 MG
1000 TABLET ORAL ONCE
Refills: 0 | Status: DISCONTINUED | OUTPATIENT
Start: 2023-08-09 | End: 2023-08-11

## 2023-08-09 RX ADMIN — HEPARIN SODIUM 5000 UNIT(S): 5000 INJECTION INTRAVENOUS; SUBCUTANEOUS at 13:27

## 2023-08-09 RX ADMIN — HEPARIN SODIUM 5000 UNIT(S): 5000 INJECTION INTRAVENOUS; SUBCUTANEOUS at 06:11

## 2023-08-09 RX ADMIN — Medication 62.5 MILLIMOLE(S): at 15:14

## 2023-08-09 RX ADMIN — SODIUM CHLORIDE 110 MILLILITER(S): 9 INJECTION, SOLUTION INTRAVENOUS at 09:06

## 2023-08-09 RX ADMIN — HEPARIN SODIUM 5000 UNIT(S): 5000 INJECTION INTRAVENOUS; SUBCUTANEOUS at 21:32

## 2023-08-10 LAB
ANION GAP SERPL CALC-SCNC: 6 MMOL/L — SIGNIFICANT CHANGE UP (ref 5–17)
BUN SERPL-MCNC: 15 MG/DL — SIGNIFICANT CHANGE UP (ref 7–23)
CALCIUM SERPL-MCNC: 10 MG/DL — SIGNIFICANT CHANGE UP (ref 8.4–10.5)
CHLORIDE SERPL-SCNC: 104 MMOL/L — SIGNIFICANT CHANGE UP (ref 96–108)
CO2 SERPL-SCNC: 23 MMOL/L — SIGNIFICANT CHANGE UP (ref 22–31)
CREAT SERPL-MCNC: 1.05 MG/DL — SIGNIFICANT CHANGE UP (ref 0.5–1.3)
EGFR: 77 ML/MIN/1.73M2 — SIGNIFICANT CHANGE UP
GLUCOSE SERPL-MCNC: 139 MG/DL — HIGH (ref 70–99)
HCT VFR BLD CALC: 40.9 % — SIGNIFICANT CHANGE UP (ref 39–50)
HGB BLD-MCNC: 14 G/DL — SIGNIFICANT CHANGE UP (ref 13–17)
MAGNESIUM SERPL-MCNC: 1.8 MG/DL — SIGNIFICANT CHANGE UP (ref 1.6–2.6)
MCHC RBC-ENTMCNC: 32 PG — SIGNIFICANT CHANGE UP (ref 27–34)
MCHC RBC-ENTMCNC: 34.2 GM/DL — SIGNIFICANT CHANGE UP (ref 32–36)
MCV RBC AUTO: 93.4 FL — SIGNIFICANT CHANGE UP (ref 80–100)
NRBC # BLD: 0 /100 WBCS — SIGNIFICANT CHANGE UP (ref 0–0)
PHOSPHATE SERPL-MCNC: 2.5 MG/DL — SIGNIFICANT CHANGE UP (ref 2.5–4.5)
PLATELET # BLD AUTO: 241 K/UL — SIGNIFICANT CHANGE UP (ref 150–400)
POTASSIUM SERPL-MCNC: 3.6 MMOL/L — SIGNIFICANT CHANGE UP (ref 3.5–5.3)
POTASSIUM SERPL-SCNC: 3.6 MMOL/L — SIGNIFICANT CHANGE UP (ref 3.5–5.3)
RBC # BLD: 4.38 M/UL — SIGNIFICANT CHANGE UP (ref 4.2–5.8)
RBC # FLD: 13.3 % — SIGNIFICANT CHANGE UP (ref 10.3–14.5)
SODIUM SERPL-SCNC: 133 MMOL/L — LOW (ref 135–145)
WBC # BLD: 10.03 K/UL — SIGNIFICANT CHANGE UP (ref 3.8–10.5)
WBC # FLD AUTO: 10.03 K/UL — SIGNIFICANT CHANGE UP (ref 3.8–10.5)

## 2023-08-10 RX ORDER — SODIUM CHLORIDE 9 MG/ML
1000 INJECTION, SOLUTION INTRAVENOUS
Refills: 0 | Status: DISCONTINUED | OUTPATIENT
Start: 2023-08-10 | End: 2023-08-11

## 2023-08-10 RX ORDER — POTASSIUM CHLORIDE 20 MEQ
20 PACKET (EA) ORAL ONCE
Refills: 0 | Status: COMPLETED | OUTPATIENT
Start: 2023-08-10 | End: 2023-08-10

## 2023-08-10 RX ORDER — MAGNESIUM SULFATE 500 MG/ML
1 VIAL (ML) INJECTION ONCE
Refills: 0 | Status: COMPLETED | OUTPATIENT
Start: 2023-08-10 | End: 2023-08-10

## 2023-08-10 RX ADMIN — HEPARIN SODIUM 5000 UNIT(S): 5000 INJECTION INTRAVENOUS; SUBCUTANEOUS at 14:49

## 2023-08-10 RX ADMIN — SODIUM CHLORIDE 40 MILLILITER(S): 9 INJECTION, SOLUTION INTRAVENOUS at 14:49

## 2023-08-10 RX ADMIN — SODIUM CHLORIDE 110 MILLILITER(S): 9 INJECTION, SOLUTION INTRAVENOUS at 10:15

## 2023-08-10 RX ADMIN — Medication 100 GRAM(S): at 09:24

## 2023-08-10 RX ADMIN — HEPARIN SODIUM 5000 UNIT(S): 5000 INJECTION INTRAVENOUS; SUBCUTANEOUS at 22:11

## 2023-08-10 RX ADMIN — HEPARIN SODIUM 5000 UNIT(S): 5000 INJECTION INTRAVENOUS; SUBCUTANEOUS at 06:16

## 2023-08-10 RX ADMIN — SODIUM CHLORIDE 40 MILLILITER(S): 9 INJECTION, SOLUTION INTRAVENOUS at 23:30

## 2023-08-10 RX ADMIN — Medication 20 MILLIEQUIVALENT(S): at 09:24

## 2023-08-11 ENCOUNTER — TRANSCRIPTION ENCOUNTER (OUTPATIENT)
Age: 68
End: 2023-08-11

## 2023-08-11 VITALS
SYSTOLIC BLOOD PRESSURE: 108 MMHG | RESPIRATION RATE: 17 BRPM | TEMPERATURE: 98 F | DIASTOLIC BLOOD PRESSURE: 68 MMHG | HEART RATE: 58 BPM | OXYGEN SATURATION: 98 %

## 2023-08-11 LAB
ANION GAP SERPL CALC-SCNC: 8 MMOL/L — SIGNIFICANT CHANGE UP (ref 5–17)
BUN SERPL-MCNC: 12 MG/DL — SIGNIFICANT CHANGE UP (ref 7–23)
CALCIUM SERPL-MCNC: 10.4 MG/DL — SIGNIFICANT CHANGE UP (ref 8.4–10.5)
CHLORIDE SERPL-SCNC: 106 MMOL/L — SIGNIFICANT CHANGE UP (ref 96–108)
CO2 SERPL-SCNC: 24 MMOL/L — SIGNIFICANT CHANGE UP (ref 22–31)
CREAT SERPL-MCNC: 1.11 MG/DL — SIGNIFICANT CHANGE UP (ref 0.5–1.3)
EGFR: 72 ML/MIN/1.73M2 — SIGNIFICANT CHANGE UP
GLUCOSE SERPL-MCNC: 92 MG/DL — SIGNIFICANT CHANGE UP (ref 70–99)
MAGNESIUM SERPL-MCNC: 2.1 MG/DL — SIGNIFICANT CHANGE UP (ref 1.6–2.6)
PHOSPHATE SERPL-MCNC: 2.5 MG/DL — SIGNIFICANT CHANGE UP (ref 2.5–4.5)
POTASSIUM SERPL-MCNC: 4 MMOL/L — SIGNIFICANT CHANGE UP (ref 3.5–5.3)
POTASSIUM SERPL-SCNC: 4 MMOL/L — SIGNIFICANT CHANGE UP (ref 3.5–5.3)
SODIUM SERPL-SCNC: 138 MMOL/L — SIGNIFICANT CHANGE UP (ref 135–145)

## 2023-08-11 PROCEDURE — 88304 TISSUE EXAM BY PATHOLOGIST: CPT

## 2023-08-11 PROCEDURE — 85025 COMPLETE CBC W/AUTO DIFF WBC: CPT

## 2023-08-11 PROCEDURE — 83735 ASSAY OF MAGNESIUM: CPT

## 2023-08-11 PROCEDURE — 97162 PT EVAL MOD COMPLEX 30 MIN: CPT

## 2023-08-11 PROCEDURE — 85027 COMPLETE CBC AUTOMATED: CPT

## 2023-08-11 PROCEDURE — 84100 ASSAY OF PHOSPHORUS: CPT

## 2023-08-11 PROCEDURE — 36415 COLL VENOUS BLD VENIPUNCTURE: CPT

## 2023-08-11 PROCEDURE — 97116 GAIT TRAINING THERAPY: CPT

## 2023-08-11 PROCEDURE — C1889: CPT

## 2023-08-11 PROCEDURE — 80048 BASIC METABOLIC PNL TOTAL CA: CPT

## 2023-08-11 RX ORDER — PEMBROLIZUMAB 25 MG/ML
200 INJECTION, SOLUTION INTRAVENOUS
Refills: 0 | DISCHARGE

## 2023-08-11 RX ORDER — SIMVASTATIN 20 MG/1
40 TABLET, FILM COATED ORAL AT BEDTIME
Refills: 0 | Status: DISCONTINUED | OUTPATIENT
Start: 2023-08-11 | End: 2023-08-11

## 2023-08-11 RX ORDER — SODIUM,POTASSIUM PHOSPHATES 278-250MG
1 POWDER IN PACKET (EA) ORAL ONCE
Refills: 0 | Status: COMPLETED | OUTPATIENT
Start: 2023-08-11 | End: 2023-08-11

## 2023-08-11 RX ADMIN — Medication 1 PACKET(S): at 12:55

## 2023-08-11 NOTE — DISCHARGE NOTE NURSING/CASE MANAGEMENT/SOCIAL WORK - NSDCPEFALRISK_GEN_ALL_CORE
For information on Fall & Injury Prevention, visit: [email or web address] OR  [email or web address] OR  https://www.cdc.gov/steadi/patient.html

## 2023-08-11 NOTE — PROGRESS NOTE ADULT - ASSESSMENT
66 yo M with PMH HTN, HLD, asthma, diverticulitis with colovescicular fistula and colon cancer s/p R hemicolectomy (2016) and now s/p exploratory laparotomy, segmental colon resection, resection of colovesicular fistula, partial cystectomy, bladder diverticulectomy, colorectal anastomosis, loop ileostomy creation (10/06/22) now s/p ileostomy reversal 8/7    LRD  pain/nausea control PRN  HSQ/SCDs/IS/OOB  Dispo: [facility] for WTD, PT no needs (refusing home care)
68 yo M with PMH HTN, HLD, asthma, diverticulitis with colovescicular fistula and colon cancer s/p R hemicolectomy (2016) and now s/p exploratory laparotomy, segmental colon resection, resection of colovesicular fistula, partial cystectomy, bladder diverticulectomy, colorectal anastomosis, loop ileostomy creation (10/06/22) now s/o ileostomy reversal 8/7    ERAS  CLD/IVF@40  pain/nausea control PRN  HSQ/SCDs/IS/OOB  PT consult  Dispo: [facility] for WTD, PT no needs
68 yo M with PMH HTN, HLD, asthma, diverticulitis with colovescicular fistula and colon cancer s/p R hemicolectomy (2016) and now s/p exploratory laparotomy, segmental colon resection, resection of colovesicular fistula, partial cystectomy, bladder diverticulectomy, colorectal anastomosis, loop ileostomy creation (10/06/22) now s/p ileostomy reversal 8/7      NPO/IVF, if distension improves, CLD for lunch  pain/nausea control PRN  HSQ/SCDs/IS/OOB  PT consult  Dispo: [facility] for WTD, PT no needs (refusing home care)
66 yo M with PMH HTN, HLD, asthma, diverticulitis with colovescicular fistula and colon cancer s/p R hemicolectomy (2016) and now s/p exploratory laparotomy, segmental colon resection, resection of colovesicular fistula, partial cystectomy, bladder diverticulectomy, colorectal anastomosis, loop ileostomy creation (10/06/22) now s/o ileostomy reversal 8/7    ERAS  CLD/IVF@40  pain/nausea control PRN  HSQ/SCDs/IS/OOB  PT consult

## 2023-08-11 NOTE — DISCHARGE NOTE NURSING/CASE MANAGEMENT/SOCIAL WORK - PATIENT PORTAL LINK FT
You can access the FollowMyHealth Patient Portal offered by [facility] by registering at the following website: [email or web address]. By joining [employer]’s FollowMyHealth portal, you will also be able to view your health information using other applications (apps) compatible with our system.

## 2023-08-11 NOTE — PROGRESS NOTE ADULT - SUBJECTIVE AND OBJECTIVE BOX
POD#1  AVSS  abd soft  wound clean  UO OK  labs OK      VNS  await return of bowel function
Procedure: Ileostomy reversal  Surgeon: Vicente    S: Pt has no complaints. Denies CP, SOB, ESTRADA, calf tenderness. Pain controlled with medication.    O:  T(C): 36.5 (08-07-23 @ 12:28), Max: 36.6 (08-07-23 @ 09:20)  T(F): 97.7 (08-07-23 @ 12:28), Max: 97.8 (08-07-23 @ 09:20)  HR: 67 (08-07-23 @ 12:28) (54 - 67)  BP: 111/74 (08-07-23 @ 12:28) (111/74 - 148/77)  RR: 19 (08-07-23 @ 12:28) (14 - 20)  SpO2: 98% (08-07-23 @ 12:28) (98% - 100%)  Wt(kg): --            Gen: NAD, resting comfortably in bed  C/V: NSR  Pulm: Nonlabored breathing, no respiratory distress  Abd: soft, NT/ND, previous stoma site covered with gauze (dry)  Extrem: WWP, no calf edema, SCDs in place      A/P: 68yMale s/p Ileostomy reversal  Diet: CLD  IVF: LR@40  Pain/nausea control  DVT ppx: SCDs, SQH
STATUS POST:  8/7: ileostomy reversal    ON; -bf     SUBJECTIVE: Patient seen and examined bedside by chief resident, tolerating diet, slightly nauseous -v/-f/-bm. OOBA. Denies sob/dizziness/waldrop/cp    heparin   Injectable 5000 Unit(s) SubCutaneous every 8 hours      Vital Signs Last 24 Hrs  T(C): 36.7 (09 Aug 2023 04:33), Max: 37.1 (08 Aug 2023 20:02)  T(F): 98 (09 Aug 2023 04:33), Max: 98.7 (08 Aug 2023 20:02)  HR: 59 (09 Aug 2023 04:33) (56 - 59)  BP: 124/77 (09 Aug 2023 04:33) (110/69 - 124/77)  BP(mean): --  RR: 17 (09 Aug 2023 04:33) (17 - 18)  SpO2: 99% (09 Aug 2023 04:33) (96% - 99%)    Parameters below as of 09 Aug 2023 04:33  Patient On (Oxygen Delivery Method): room air      I&O's Detail    08 Aug 2023 07:01  -  09 Aug 2023 07:00  --------------------------------------------------------  IN:    dextrose 5% + sodium chloride 0.45%: 780 mL    Lactated Ringers Bolus: 500 mL    Oral Fluid: 660 mL  Total IN: 1940 mL    OUT:    Voided (mL): 1550 mL  Total OUT: 1550 mL    Total NET: 390 mL          General: NAD, resting comfortably in bed  C/V: NSR  Pulm: Nonlabored breathing, no respiratory distress, room air  Abd: soft, NT/ND. No rebound or guarding,  Incisions; c/d/i  Extrem: WWP, no edema, SCDs in place        LABS:                        14.8   14.34 )-----------( 269      ( 08 Aug 2023 05:30 )             42.3     08-08    134<L>  |  102  |  36<H>  ----------------------------<  115<H>  4.2   |  23  |  1.59<H>    Ca    10.1      08 Aug 2023 05:30  Phos  3.3     08-08  Mg     1.8     08-08        Urinalysis Basic - ( 08 Aug 2023 05:30 )    Color: x / Appearance: x / SG: x / pH: x  Gluc: 115 mg/dL / Ketone: x  / Bili: x / Urobili: x   Blood: x / Protein: x / Nitrite: x   Leuk Esterase: x / RBC: x / WBC x   Sq Epi: x / Non Sq Epi: x / Bacteria: x        RADIOLOGY & ADDITIONAL STUDIES:  
SUBJECTIVE: Pt seen and examined at bedside with chief. Pt denies any complaints. Pain well controlled. Tolerating diet without N/V. Denies any BF     MEDICATIONS  (STANDING):  acetaminophen     Tablet .. 1000 milliGRAM(s) Oral every 6 hours  heparin   Injectable 5000 Unit(s) SubCutaneous every 8 hours  lactated ringers. 1000 milliLiter(s) (40 mL/Hr) IV Continuous <Continuous>    MEDICATIONS  (PRN):  HYDROmorphone  Injectable 0.25 milliGRAM(s) IV Push every 4 hours PRN breakthrough  oxyCODONE    IR 2.5 milliGRAM(s) Oral every 4 hours PRN Moderate Pain (4 - 6)  oxyCODONE    IR 5 milliGRAM(s) Oral every 4 hours PRN Severe Pain (7 - 10)      Vital Signs Last 24 Hrs  T(C): 36.6 (08 Aug 2023 05:24), Max: 36.6 (07 Aug 2023 09:20)  T(F): 97.8 (08 Aug 2023 05:24), Max: 97.8 (07 Aug 2023 09:20)  HR: 55 (08 Aug 2023 05:24) (54 - 67)  BP: 123/80 (08 Aug 2023 05:24) (108/73 - 148/77)  BP(mean): 102 (07 Aug 2023 10:30) (98 - 108)  RR: 18 (08 Aug 2023 05:24) (14 - 20)  SpO2: 98% (08 Aug 2023 05:24) (97% - 100%)    Parameters below as of 08 Aug 2023 05:24  Patient On (Oxygen Delivery Method): room air        PHYSICAL EXAM:      Constitutional: A&Ox3    Respiratory: non labored breathing, no respiratory distress    Cardiovascular: NSR, RRR    Gastrointestinal: Soft, ND, NT                 Incision: CDI, with old stoma site healing well, WTD dressings changed     Genitourinary: voiding     Extremities: (-) edema                  I&O's Detail    07 Aug 2023 07:01  -  08 Aug 2023 07:00  --------------------------------------------------------  IN:    Lactated Ringers: 800 mL    Oral Fluid: 540 mL  Total IN: 1340 mL    OUT:    Voided (mL): 800 mL  Total OUT: 800 mL    Total NET: 540 mL          LABS:                RADIOLOGY & ADDITIONAL STUDIES:
tolerating PO  moving bowels  AVSS  labs OK  abd soft  wound clean    advance diet  OOB  Heploc
small flatus  AVSS  abd distended    OOB  NPO  IVF
passing flatus  having Bms  No nausea  AVSS  Labs Ok  Abd soft NT/ND    Start clears  OOB
STATUS POST:  8/7: ileostomy reversal    ON:  -N/+V (200cc). +F/+BM (x2). mild distension.     SUBJECTIVE: Patient seen and examined bedside by chief resident, patient has no acute complaints. NPo , -n/-v/+f/+bm. Poor effort ooba. Denies sob/waldrop/dizziness/cp.    heparin   Injectable 5000 Unit(s) SubCutaneous every 8 hours      Vital Signs Last 24 Hrs  T(C): 36.7 (10 Aug 2023 05:04), Max: 36.8 (09 Aug 2023 14:17)  T(F): 98.1 (10 Aug 2023 05:04), Max: 98.2 (09 Aug 2023 14:17)  HR: 64 (10 Aug 2023 05:04) (60 - 67)  BP: 130/81 (10 Aug 2023 05:04) (130/68 - 154/93)  BP(mean): --  RR: 17 (10 Aug 2023 05:04) (17 - 18)  SpO2: 97% (10 Aug 2023 05:04) (97% - 99%)    Parameters below as of 10 Aug 2023 05:04  Patient On (Oxygen Delivery Method): room air      I&O's Detail    09 Aug 2023 07:01  -  10 Aug 2023 07:00  --------------------------------------------------------  IN:    dextrose 5% + sodium chloride 0.45%: 2640 mL    Oral Fluid: 320 mL  Total IN: 2960 mL    OUT:    Voided (mL): 900 mL  Total OUT: 900 mL    Total NET: 2060 mL          General: NAD, resting comfortably in bed  C/V: NSR  Pulm: Nonlabored breathing, no respiratory distress  Abd: soft, mildly distended, nt. No rebound or guarding  Incisions; c/d/i  Extrem: WWP, no edema, SCDs in place        LABS:                        14.0   10.03 )-----------( 241      ( 10 Aug 2023 05:30 )             40.9     08-10    133<L>  |  104  |  15  ----------------------------<  139<H>  3.6   |  23  |  1.05    Ca    10.0      10 Aug 2023 05:30  Phos  2.5     08-10  Mg     1.8     08-10        Urinalysis Basic - ( 10 Aug 2023 05:30 )    Color: x / Appearance: x / SG: x / pH: x  Gluc: 139 mg/dL / Ketone: x  / Bili: x / Urobili: x   Blood: x / Protein: x / Nitrite: x   Leuk Esterase: x / RBC: x / WBC x   Sq Epi: x / Non Sq Epi: x / Bacteria: x        RADIOLOGY & ADDITIONAL STUDIES:  
STATUS POST:  8/7: ileostomy reversal    ON;  brett CLD for dinner. +F/+BM. x2 missed voids.      SUBJECTIVE: Patient seen and examined bedside by chief resident, patient has no acute complaints. Tolerating diet, -n/-v/+f/+bm. OOBA. Denies sob/waldrop/dizziness/ha.     heparin   Injectable 5000 Unit(s) SubCutaneous every 8 hours      Vital Signs Last 24 Hrs  T(C): 36.8 (11 Aug 2023 13:03), Max: 36.8 (11 Aug 2023 13:03)  T(F): 98.2 (11 Aug 2023 13:03), Max: 98.2 (11 Aug 2023 13:03)  HR: 58 (11 Aug 2023 13:03) (56 - 61)  BP: 108/68 (11 Aug 2023 13:03) (108/68 - 129/83)  BP(mean): --  RR: 17 (11 Aug 2023 13:03) (17 - 17)  SpO2: 98% (11 Aug 2023 13:03) (97% - 98%)    Parameters below as of 11 Aug 2023 13:03  Patient On (Oxygen Delivery Method): room air      I&O's Detail    10 Aug 2023 07:01  -  11 Aug 2023 07:00  --------------------------------------------------------  IN:    dextrose 5% + sodium chloride 0.9%: 1080 mL  Total IN: 1080 mL    OUT:    Oral Fluid: 0 mL    Voided (mL): 150 mL  Total OUT: 150 mL    Total NET: 930 mL      11 Aug 2023 07:01  -  11 Aug 2023 14:22  --------------------------------------------------------  IN:    dextrose 5% + sodium chloride 0.9%: 60 mL    Oral Fluid: 360 mL  Total IN: 420 mL    OUT:    Voided (mL): 200 mL  Total OUT: 200 mL    Total NET: 220 mL          General: NAD, resting comfortably in bed  C/V: NSR  Pulm: Nonlabored breathing, no respiratory distress  Abd: soft, NT/ND. wound packing +wet-dry. No rebound or guarding  Incisionsl c/d/i  Extrem: WWP, no edema, SCDs in place        LABS:                        14.0   10.03 )-----------( 241      ( 10 Aug 2023 05:30 )             40.9     08-11    138  |  106  |  12  ----------------------------<  92  4.0   |  24  |  1.11    Ca    10.4      11 Aug 2023 07:34  Phos  2.5     08-11  Mg     2.1     08-11        Urinalysis Basic - ( 11 Aug 2023 07:34 )    Color: x / Appearance: x / SG: x / pH: x  Gluc: 92 mg/dL / Ketone: x  / Bili: x / Urobili: x   Blood: x / Protein: x / Nitrite: x   Leuk Esterase: x / RBC: x / WBC x   Sq Epi: x / Non Sq Epi: x / Bacteria: x        RADIOLOGY & ADDITIONAL STUDIES:

## 2023-08-11 NOTE — PROGRESS NOTE ADULT - REASON FOR ADMISSION
reversal of ileostomy

## 2023-08-11 NOTE — PROGRESS NOTE ADULT - PROVIDER SPECIALTY LIST ADULT
Colorectal Surgery
Surgery
Surgery
Colorectal Surgery
Surgery

## 2023-08-31 LAB — SURGICAL PATHOLOGY STUDY: SIGNIFICANT CHANGE UP

## 2024-01-10 ENCOUNTER — OUTPATIENT (OUTPATIENT)
Dept: OUTPATIENT SERVICES | Facility: HOSPITAL | Age: 69
LOS: 1 days | End: 2024-01-10
Payer: COMMERCIAL

## 2024-01-10 ENCOUNTER — APPOINTMENT (OUTPATIENT)
Dept: CT IMAGING | Facility: HOSPITAL | Age: 69
End: 2024-01-10
Payer: COMMERCIAL

## 2024-01-10 DIAGNOSIS — N32.1 VESICOINTESTINAL FISTULA: Chronic | ICD-10-CM

## 2024-01-10 DIAGNOSIS — Z90.49 ACQUIRED ABSENCE OF OTHER SPECIFIED PARTS OF DIGESTIVE TRACT: Chronic | ICD-10-CM

## 2024-01-10 DIAGNOSIS — Z96.0 PRESENCE OF UROGENITAL IMPLANTS: Chronic | ICD-10-CM

## 2024-01-10 DIAGNOSIS — Z98.890 OTHER SPECIFIED POSTPROCEDURAL STATES: Chronic | ICD-10-CM

## 2024-01-10 DIAGNOSIS — Z98.89 OTHER SPECIFIED POSTPROCEDURAL STATES: Chronic | ICD-10-CM

## 2024-01-10 LAB
POCT ISTAT CREATININE: 1.2 MG/DL — SIGNIFICANT CHANGE UP (ref 0.5–1.3)
POCT ISTAT CREATININE: 1.2 MG/DL — SIGNIFICANT CHANGE UP (ref 0.5–1.3)

## 2024-01-10 PROCEDURE — 74177 CT ABD & PELVIS W/CONTRAST: CPT

## 2024-01-10 PROCEDURE — 71260 CT THORAX DX C+: CPT | Mod: 26

## 2024-01-10 PROCEDURE — 71260 CT THORAX DX C+: CPT

## 2024-01-10 PROCEDURE — 82565 ASSAY OF CREATININE: CPT

## 2024-01-10 PROCEDURE — 74177 CT ABD & PELVIS W/CONTRAST: CPT | Mod: 26

## 2024-07-23 NOTE — ED ADULT NURSE NOTE - CAS EDP DISCH DISPOSITION ADMI
[8] : 8 [de-identified] : 7/23/24: 56yo F ([facility]) with bilateral L>R knee pain that has been worsening over the past few months with no injury. She was most recently treated for this issue with Dr. Lowry. She has tried multiple modalities of conservative treatment including anti-inflammatories, PT/HEP, CSI, and gel injections that are no longer providing sufficient relief. Admits to increasing pain and difficulty with prolonged walking/standing, startup, and stairs.  [] : no [FreeTextEntry5] : Aakash knee pain since Feb 2024. No injury. L>R. Seen Mait 02/24, Stated oa aakash knee. Was given CSI and Euflexxa injection with slight relief. slight swelling. Using Tylenol and icing for relief.  L knee meniscectomy 2018 [de-identified] : 2018 [de-identified] : XR  9 UR

## 2024-08-19 ENCOUNTER — APPOINTMENT (OUTPATIENT)
Dept: CT IMAGING | Facility: HOSPITAL | Age: 69
End: 2024-08-19
Payer: COMMERCIAL

## 2024-08-19 ENCOUNTER — OUTPATIENT (OUTPATIENT)
Dept: OUTPATIENT SERVICES | Facility: HOSPITAL | Age: 69
LOS: 1 days | End: 2024-08-19
Payer: COMMERCIAL

## 2024-08-19 DIAGNOSIS — Z90.49 ACQUIRED ABSENCE OF OTHER SPECIFIED PARTS OF DIGESTIVE TRACT: Chronic | ICD-10-CM

## 2024-08-19 DIAGNOSIS — Z96.0 PRESENCE OF UROGENITAL IMPLANTS: Chronic | ICD-10-CM

## 2024-08-19 DIAGNOSIS — Z98.890 OTHER SPECIFIED POSTPROCEDURAL STATES: Chronic | ICD-10-CM

## 2024-08-19 DIAGNOSIS — N32.1 VESICOINTESTINAL FISTULA: Chronic | ICD-10-CM

## 2024-08-19 DIAGNOSIS — Z98.89 OTHER SPECIFIED POSTPROCEDURAL STATES: Chronic | ICD-10-CM

## 2024-08-19 LAB — POCT ISTAT CREATININE: 1.3 MG/DL — SIGNIFICANT CHANGE UP (ref 0.5–1.3)

## 2024-08-19 PROCEDURE — 71260 CT THORAX DX C+: CPT | Mod: 26

## 2024-08-19 PROCEDURE — 74177 CT ABD & PELVIS W/CONTRAST: CPT

## 2024-08-19 PROCEDURE — 71260 CT THORAX DX C+: CPT

## 2024-08-19 PROCEDURE — 82565 ASSAY OF CREATININE: CPT

## 2024-08-19 PROCEDURE — 74177 CT ABD & PELVIS W/CONTRAST: CPT | Mod: 26

## 2024-09-05 NOTE — PRE-ANESTHESIA EVALUATION ADULT - NSANTHAGERD_ENT_A_CORE
"Discharge Instructions: After Your Surgery/Procedure  Youve just had surgery. During surgery you were given medicine called anesthesia to keep you relaxed and free of pain. After surgery you may have some pain or nausea. This is common. Here are some tips for feeling better and getting well after surgery.     Stay on schedule with your medication.   Going home  Your doctor or nurse will show you how to take care of yourself when you go home. He or she will also answer your questions. Have an adult family member or friend drive you home.      For your safety we recommend these precaution for the first 24 hours after your procedure:  Do not drive or use heavy equipment.  Do not make important decisions or sign legal papers.  Do not drink alcohol.  Have someone stay with you, if needed. He or she can watch for problems and help keep you safe.  Your concentration, balance, coordination, and judgement may be impaired for many hours after anesthesia.  Use caution when ambulating or standing up.     You may feel weak and "washed out" after anesthesia and surgery.      Subtle residual effects of general anesthesia or sedation with regional / local anesthesia can last more than 24 hours.  Rest for the remainder of the day or longer if your Doctor/Surgeon has advised you to do so.  Although you may feel normal within the first 24 hours, your reflexes and mental ability may be impaired without you realizing it.  You may feel dizzy, lightheaded or sleepy for 24 hours or longer.      Be sure to go to all follow-up visits with your doctor. And rest after your surgery for as long as your doctor tells you to.  Coping with pain  If you have pain after surgery, pain medicine will help you feel better. Take it as told, before pain becomes severe. Also, ask your doctor or pharmacist about other ways to control pain. This might be with heat, ice, or relaxation. And follow any other instructions your surgeon or nurse gives you.  Tips " for taking pain medicine  To get the best relief possible, remember these points:  Pain medicines can upset your stomach. Taking them with a little food may help.  Most pain relievers taken by mouth need at least 20 to 30 minutes to start to work.  Taking medicine on a schedule can help you remember to take it. Try to time your medicine so that you can take it before starting an activity. This might be before you get dressed, go for a walk, or sit down for dinner.  Constipation is a common side effect of pain medicines. Call your doctor before taking any medicines such as laxatives or stool softeners to help ease constipation. Also ask if you should skip any foods. Drinking lots of fluids and eating foods such as fruits and vegetables that are high in fiber can also help. Remember, do not take laxatives unless your surgeon has prescribed them.  Drinking alcohol and taking pain medicine can cause dizziness and slow your breathing. It can even be deadly. Do not drink alcohol while taking pain medicine.  Pain medicine can make you react more slowly to things. Do not drive or run machinery while taking pain medicine.  Your health care provider may tell you to take acetaminophen to help ease your pain. Ask him or her how much you are supposed to take each day. Acetaminophen or other pain relievers may interact with your prescription medicines or other over-the-counter (OTC) drugs. Some prescription medicines have acetaminophen and other ingredients. Using both prescription and OTC acetaminophen for pain can cause you to overdose. Read the labels on your OTC medicines with care. This will help you to clearly know the list of ingredients, how much to take, and any warnings. It may also help you not take too much acetaminophen. If you have questions or do not understand the information, ask your pharmacist or health care provider to explain it to you before you take the OTC medicine.  Managing nausea  Some people have an  upset stomach after surgery. This is often because of anesthesia, pain, or pain medicine, or the stress of surgery. These tips will help you handle nausea and eat healthy foods as you get better. If you were on a special food plan before surgery, ask your doctor if you should follow it while you get better. These tips may help:  Do not push yourself to eat. Your body will tell you when to eat and how much.  Start off with clear liquids and soup. They are easier to digest.  Next try semi-solid foods, such as mashed potatoes, applesauce, and gelatin, as you feel ready.  Slowly move to solid foods. Dont eat fatty, rich, or spicy foods at first.  Do not force yourself to have 3 large meals a day. Instead eat smaller amounts more often.  Take pain medicines with a small amount of solid food, such as crackers or toast, to avoid nausea.     Call your surgeon if  You still have pain an hour after taking medicine. The medicine may not be strong enough.  You feel too sleepy, dizzy, or groggy. The medicine may be too strong.  You have side effects like nausea, vomiting, or skin changes, such as rash, itching, or hives.       If you have obstructive sleep apnea  You were given anesthesia medicine during surgery to keep you comfortable and free of pain. After surgery, you may have more apnea spells because of this medicine and other medicines you were given. The spells may last longer than usual.   At home:  Keep using the continuous positive airway pressure (CPAP) device when you sleep. Unless your health care provider tells you not to, use it when you sleep, day or night. CPAP is a common device used to treat obstructive sleep apnea.  Talk with your provider before taking any pain medicine, muscle relaxants, or sedatives. Your provider will tell you about the possible dangers of taking these medicines.  © 4717-0412 [employer] [address] [place] [address], [place] [address]. All rights reserved. This information is  not intended as a substitute for professional medical care. Always follow your healthcare professional's instructions.            Using an Incentive Spirometer    An incentive spirometer is a device that helps you do deep breathing exercises. These exercises expand your lungs, aid in circulation, and help prevent pneumonia. Deep breathing exercises also help you breathe better and improve the function of your lungs by:  Keeping your lungs clear  Strengthening your breathing muscles  Helping prevent respiratory complications or problems  The incentive spirometer gives you a way to take an active part in recover. A nurse or therapist will teach you breathing exercises. To do these exercises, you will breathe in through your mouth and not your nose. The incentive spirometer only works correctly if you breathe in through your mouth.  Steps to clear lungs  Step 1. Exhale normally. Then, inhale normally.  Relax and breathe out.  Step 2. Place your lips tightly around the mouthpiece.  Make sure the device is upright and not tilted.  Step 3. Inhale as much air as you can through the mouthpiece (don't breath through your nose).  Inhale slowly and deeply.  Hold your breath long enough to keep the balls or disk raised for at least 3 to 5 seconds, or as instructed by your healthcare provider.  Some spirometers have an indicator to let you know that you are breathing in too fast. If the indicator goes off, breathe in more slowly.  Step 4. Repeat the exercise regularly.  Do this exercise every hour while you're awake, or as instructed by your healthcare provider.  If you were taught deep breathing and coughing exercises, do them regularly as instructed by your healthcare provider.       Post op instructions for prevention of DVT  What is deep vein thrombosis?  Deep vein thrombosis (DVT) is the medical term for blood clots in the deep veins of the leg.  These blood clots can be dangerous.  A DVT can block a blood vessel and keep  blood from getting where it needs to go.  Another problem is that the clot can travel to other parts of the body such as the lungs.  A clot that travels to the lungs is called a pulmonary embolus (PE) and can cause serious problems with breathing which can lead to death.  Am I at risk for DVT/PE?  If you are not very active, you are at risk of DVT.  Anyone confined to bed, sitting for long periods of time, recovering from surgery, etc. increases the risk of DVT.  Other risk factors are cancer diagnosis, certain medications, estrogen replacement in any form,older age, obesity, pregnancy, smoking, history of clotting disorders, and dehydration.  How will I know if I have a DVT?  Swelling in the lower leg  Pain  Warmth, redness, hardness or bulging of the vein  If you have any of these symptoms, call your doctors office right away.  Some people will not have any symptoms until the clot moves to the lungs.  What are the symptoms of a PE?  Panting, shortness of breath, or trouble breathing  Sharp, knife-like chest pain when you breathe  Coughing or coughing up blood  Rapid heartbeat  If you have any of these symptoms or get worse quickly, call 911 for emergency treatment.  How can I prevent a DVT?  Avoid long periods of inactivity and dont cross your legs--get up and walk around every hour or so.  Stay active--walking after surgery is highly encouraged.  This means you should get out of the house and walk in the neighborhood.  Going up and down stairs will not impair healing (unless advised against such activity by your doctor).    Drink plenty of noncaffeinated, nonalcoholic fluids each day to prevent dehydration.  Wear special support stockings, if they have been advised by your doctor.  If you travel, stop at least once an hour and walk around.  Avoid smoking (assistance with stopping is available through your healthcare provider)  Always notify your doctor if you are not able to follow the post operative  instructions that are given to you at the time of discharge.  It may be necessary to prescribe one of the medications available to prevent DVT.            We hope your stay was comfortable as you heal now, mend and rest.    For we have enjoyed taking care of you by giving your our best.    And as you get better, by regaining your health and strength;   We count it as a privilege to have served you and hope your time at [facility] was well spent.      Thank  You!!!                Exparel(bupivacaine) has been injected to provide approximately 72 hours of reduced pain after your surgery.  Do not remove the bracelet for five days.  Report to your doctor as soon as possible if you experience any of the following:   Restlessness   Anxiety   Speech problems    Lightheadedness   Numbness and tingling of the mouth and lips   Seizures    Metallic taste   Blurred vision   Tremors    Twitching   Depression   Extreme drowsiness  Avoid additional use of local anesthetics (such as dental procedures) for five days (96 hours).   Yes

## 2025-03-17 NOTE — ED ADULT TRIAGE NOTE - ISOLATION TYPE:
----- Message from Summer sent at 3/17/2025 11:16 AM CDT -----  Good morning can you rescheduled pt for a 1 mth po with Dr. Gee. Her appt was scheduled on 3/18/25 but was accidentally canceled.   None

## 2025-04-17 NOTE — ED ADULT NURSE NOTE - CAS DISCH BELONGINGS RETURNED
Your provider has suggested that you undergo Scrambler therapy for the treatment of your nerve related pain.      The following medications/substance need to be discontinued before starting your therapy session or your session will be cancelled.      Gabapentin ( Neurontin )/Pregablin (Lyrica)/ Topiramate ( Topamax )   -  This medication must be stopped 72 hours before you start treatment.    -   If you need help weaning off your medication please contact the [facility] [place] [facility] office for instructions.  You should not discontinue these medications rapidly as there can be withdraw symptoms.       Scrambler therapy involves a series of consecutive 1 hour treatments, 8-10, over a 2 week period.  For the therapy to be successful you must attend them daily without skipping except over the weekend.         Stop Gabapentin 3 days before Scrambler starts.    
Not applicable

## 2025-05-16 NOTE — ED ADULT NURSE NOTE - SUICIDE SCREENING QUESTION 1
No Constitutional - well-developed; well nourished. Head - NCAT. Airway patent. Eyes - PERRL. CV - RRR. no murmur. no edema. Pulm - CTAB. Abd - soft, nt. no rebound. no guarding. Neuro - A&Ox3. strength 5/5 x4. sensation intact x4. normal gait. Skin - No rash. MSK - normal ROM.

## 2025-05-21 NOTE — DIETITIAN NUTRITION RISK NOTIFICATION - WEIGHT LOSS
":  Assessment & Plan  Seasonal allergic rhinitis due to pollen    Orders:    cetirizine (ZyrTEC) oral solution; Take 5 mL (5 mg total) by mouth daily at bedtime    fluticasone (FLONASE) 50 mcg/act nasal spray; 1 spray into each nostril daily    Allergic conjunctivitis, unspecified laterality         Continue using the Zaditor eye drops bid as directed  OK to use OTC Systane or other NSS eye drops as directed when he comes inside the house after playing outside prn  NO concern for bacterial conjunctivitis  Cool compress prn  Wash off his face when coming inside house      History of Present Illness     Giacomo Jenkins is a 4 y.o. male   Here for sick visit.  Here with Grammy.  States has eye drops and \"they are not helping\". Denies any cough, no sneezing. Has #2 kittens at home for the past 1 month, but mom reported that the eye redness and irritation only began about 1 week ago. Mom called and they gave rx: allergy eye drops daily bid.   Arash states awoke this AM with crusty clear eye drainage.  He does play outside,  Denies any fevers, but younger brother was sick the week before and seen in office.  Arash tells him not to rub his eyes, but he's rubbing them all day.  Eating and drinking well. No issues voiding or stooling.          Review of Systems   Constitutional:  Negative for activity change, appetite change and fever.   HENT:  Positive for congestion and postnasal drip. Negative for sore throat.    Eyes:  Positive for redness and itching. Negative for visual disturbance.   Respiratory: Negative.     Cardiovascular: Negative.    Gastrointestinal: Negative.    Allergic/Immunologic: Positive for environmental allergies.   All other systems reviewed and are negative.    Objective   Temp 97.7 °F (36.5 °C) (Tympanic)   Ht 3' 9.08\" (1.145 m)   Wt 17.1 kg (37 lb 9.6 oz)   BMI 13.01 kg/m²      Physical Exam  Vitals and nursing note reviewed. Exam conducted with a chaperone present.   Constitutional:       General: " "He is active.      Appearance: Normal appearance. He is well-developed.      Comments: Child would not talk, uncooperative and 'fighting\" during exam   HENT:      Right Ear: Tympanic membrane and ear canal normal. Tympanic membrane is not erythematous or bulging.      Left Ear: Tympanic membrane and ear canal normal. Tympanic membrane is not erythematous or bulging.      Nose: Congestion present. No rhinorrhea.      Mouth/Throat:      Tonsils: No tonsillar exudate.      Comments: Child refused to open mouth during exam, unable to assess    Eyes:      General:         Right eye: No discharge.         Left eye: No discharge.      Comments: Scleral pinkness noted brody, conjunctivae injected, child rubbing his eyes during exam, no d/c     Cardiovascular:      Rate and Rhythm: Normal rate and regular rhythm.      Heart sounds: Normal heart sounds, S1 normal and S2 normal. No murmur heard.  Pulmonary:      Effort: Pulmonary effort is normal. No respiratory distress.      Breath sounds: Normal breath sounds and air entry. No wheezing.     Musculoskeletal:      Cervical back: Neck supple.   Lymphadenopathy:      Cervical: No cervical adenopathy.     Skin:     General: Skin is warm and dry.     Neurological:      Mental Status: He is alert.     Psychiatric:         Behavior: Behavior normal.      Comments: Oppositional and beligerent           " > 10% in 6 months

## 2025-05-21 NOTE — PHYSICAL THERAPY INITIAL EVALUATION ADULT - PHYSICAL ASSIST/NONPHYSICAL ASSIST: SIT/SUPINE, REHAB EVAL
Patient called and is okay with being on the 25mg and will like kath to call in med to the pharmacy    [facility] - [place], [place] - [address] - P [phone] - F [phone]    supervision/verbal cues

## 2025-07-24 ENCOUNTER — INPATIENT (INPATIENT)
Facility: HOSPITAL | Age: 70
LOS: 3 days | Discharge: HOME | DRG: 603 | End: 2025-07-28
Attending: HOSPITALIST | Admitting: STUDENT IN AN ORGANIZED HEALTH CARE EDUCATION/TRAINING PROGRAM
Payer: COMMERCIAL

## 2025-07-24 VITALS
HEART RATE: 87 BPM | WEIGHT: 169.98 LBS | DIASTOLIC BLOOD PRESSURE: 90 MMHG | OXYGEN SATURATION: 97 % | HEIGHT: 68 IN | RESPIRATION RATE: 18 BRPM | TEMPERATURE: 98 F | SYSTOLIC BLOOD PRESSURE: 152 MMHG

## 2025-07-24 DIAGNOSIS — Z90.49 ACQUIRED ABSENCE OF OTHER SPECIFIED PARTS OF DIGESTIVE TRACT: Chronic | ICD-10-CM

## 2025-07-24 DIAGNOSIS — Z98.890 OTHER SPECIFIED POSTPROCEDURAL STATES: Chronic | ICD-10-CM

## 2025-07-24 DIAGNOSIS — L03.90 CELLULITIS, UNSPECIFIED: ICD-10-CM

## 2025-07-24 DIAGNOSIS — Z98.89 OTHER SPECIFIED POSTPROCEDURAL STATES: Chronic | ICD-10-CM

## 2025-07-24 DIAGNOSIS — C18.9 MALIGNANT NEOPLASM OF COLON, UNSPECIFIED: ICD-10-CM

## 2025-07-24 DIAGNOSIS — Z85.038 PERSONAL HISTORY OF OTHER MALIGNANT NEOPLASM OF LARGE INTESTINE: ICD-10-CM

## 2025-07-24 DIAGNOSIS — Z96.0 PRESENCE OF UROGENITAL IMPLANTS: Chronic | ICD-10-CM

## 2025-07-24 DIAGNOSIS — N32.1 VESICOINTESTINAL FISTULA: Chronic | ICD-10-CM

## 2025-07-24 DIAGNOSIS — Z29.9 ENCOUNTER FOR PROPHYLACTIC MEASURES, UNSPECIFIED: ICD-10-CM

## 2025-07-24 DIAGNOSIS — J45.909 UNSPECIFIED ASTHMA, UNCOMPLICATED: ICD-10-CM

## 2025-07-24 DIAGNOSIS — E78.5 HYPERLIPIDEMIA, UNSPECIFIED: ICD-10-CM

## 2025-07-24 DIAGNOSIS — I10 ESSENTIAL (PRIMARY) HYPERTENSION: ICD-10-CM

## 2025-07-24 LAB
ANION GAP SERPL CALC-SCNC: 12 MMOL/L — SIGNIFICANT CHANGE UP (ref 5–17)
BASOPHILS # BLD AUTO: 0.04 K/UL — SIGNIFICANT CHANGE UP (ref 0–0.2)
BASOPHILS NFR BLD AUTO: 0.3 % — SIGNIFICANT CHANGE UP (ref 0–2)
BUN SERPL-MCNC: 25 MG/DL — HIGH (ref 7–23)
CALCIUM SERPL-MCNC: 9.9 MG/DL — SIGNIFICANT CHANGE UP (ref 8.4–10.5)
CHLORIDE SERPL-SCNC: 99 MMOL/L — SIGNIFICANT CHANGE UP (ref 96–108)
CO2 SERPL-SCNC: 26 MMOL/L — SIGNIFICANT CHANGE UP (ref 22–31)
CREAT SERPL-MCNC: 1.23 MG/DL — SIGNIFICANT CHANGE UP (ref 0.5–1.3)
EGFR: 63 ML/MIN/1.73M2 — SIGNIFICANT CHANGE UP
EGFR: 63 ML/MIN/1.73M2 — SIGNIFICANT CHANGE UP
EOSINOPHIL # BLD AUTO: 0.08 K/UL — SIGNIFICANT CHANGE UP (ref 0–0.5)
EOSINOPHIL NFR BLD AUTO: 0.6 % — SIGNIFICANT CHANGE UP (ref 0–6)
GLUCOSE SERPL-MCNC: 129 MG/DL — HIGH (ref 70–99)
HCT VFR BLD CALC: 40.8 % — SIGNIFICANT CHANGE UP (ref 39–50)
HGB BLD-MCNC: 13.6 G/DL — SIGNIFICANT CHANGE UP (ref 13–17)
IMM GRANULOCYTES # BLD AUTO: 0.09 K/UL — HIGH (ref 0–0.07)
IMM GRANULOCYTES NFR BLD AUTO: 0.7 % — SIGNIFICANT CHANGE UP (ref 0–0.9)
LYMPHOCYTES # BLD AUTO: 0.77 K/UL — LOW (ref 1–3.3)
LYMPHOCYTES NFR BLD AUTO: 5.8 % — LOW (ref 13–44)
MCHC RBC-ENTMCNC: 30.4 PG — SIGNIFICANT CHANGE UP (ref 27–34)
MCHC RBC-ENTMCNC: 33.3 G/DL — SIGNIFICANT CHANGE UP (ref 32–36)
MCV RBC AUTO: 91.3 FL — SIGNIFICANT CHANGE UP (ref 80–100)
MONOCYTES # BLD AUTO: 1.43 K/UL — HIGH (ref 0–0.9)
MONOCYTES NFR BLD AUTO: 10.8 % — SIGNIFICANT CHANGE UP (ref 2–14)
NEUTROPHILS # BLD AUTO: 10.79 K/UL — HIGH (ref 1.8–7.4)
NEUTROPHILS NFR BLD AUTO: 81.8 % — HIGH (ref 43–77)
NRBC # BLD AUTO: 0 K/UL — SIGNIFICANT CHANGE UP (ref 0–0)
NRBC # FLD: 0 K/UL — SIGNIFICANT CHANGE UP (ref 0–0)
NRBC BLD AUTO-RTO: 0 /100 WBCS — SIGNIFICANT CHANGE UP (ref 0–0)
PLATELET # BLD AUTO: 229 K/UL — SIGNIFICANT CHANGE UP (ref 150–400)
PMV BLD: 9.5 FL — SIGNIFICANT CHANGE UP (ref 7–13)
POTASSIUM SERPL-MCNC: 4 MMOL/L — SIGNIFICANT CHANGE UP (ref 3.5–5.3)
POTASSIUM SERPL-SCNC: 4 MMOL/L — SIGNIFICANT CHANGE UP (ref 3.5–5.3)
RBC # BLD: 4.47 M/UL — SIGNIFICANT CHANGE UP (ref 4.2–5.8)
RBC # FLD: 14.3 % — SIGNIFICANT CHANGE UP (ref 10.3–14.5)
SODIUM SERPL-SCNC: 137 MMOL/L — SIGNIFICANT CHANGE UP (ref 135–145)
WBC # BLD: 13.2 K/UL — HIGH (ref 3.8–10.5)
WBC # FLD AUTO: 13.2 K/UL — HIGH (ref 3.8–10.5)

## 2025-07-24 PROCEDURE — 36415 COLL VENOUS BLD VENIPUNCTURE: CPT

## 2025-07-24 PROCEDURE — 99285 EMERGENCY DEPT VISIT HI MDM: CPT

## 2025-07-24 PROCEDURE — 93971 EXTREMITY STUDY: CPT | Mod: 26,LT

## 2025-07-24 PROCEDURE — 99223 1ST HOSP IP/OBS HIGH 75: CPT

## 2025-07-24 PROCEDURE — 93010 ELECTROCARDIOGRAM REPORT: CPT

## 2025-07-24 PROCEDURE — 80048 BASIC METABOLIC PNL TOTAL CA: CPT

## 2025-07-24 PROCEDURE — 85025 COMPLETE CBC W/AUTO DIFF WBC: CPT

## 2025-07-24 RX ORDER — CEFAZOLIN SODIUM IN 0.9 % NACL 3 G/100 ML
2000 INTRAVENOUS SOLUTION, PIGGYBACK (ML) INTRAVENOUS EVERY 8 HOURS
Refills: 0 | Status: DISCONTINUED | OUTPATIENT
Start: 2025-07-24 | End: 2025-07-26

## 2025-07-24 RX ORDER — ATORVASTATIN CALCIUM 80 MG/1
20 TABLET, FILM COATED ORAL AT BEDTIME
Refills: 0 | Status: DISCONTINUED | OUTPATIENT
Start: 2025-07-24 | End: 2025-07-24

## 2025-07-24 RX ORDER — CEFAZOLIN SODIUM IN 0.9 % NACL 3 G/100 ML
INTRAVENOUS SOLUTION, PIGGYBACK (ML) INTRAVENOUS
Refills: 0 | Status: DISCONTINUED | OUTPATIENT
Start: 2025-07-24 | End: 2025-07-24

## 2025-07-24 RX ORDER — POLYETHYLENE GLYCOL 3350 17 G/17G
17 POWDER, FOR SOLUTION ORAL DAILY
Refills: 0 | Status: DISCONTINUED | OUTPATIENT
Start: 2025-07-24 | End: 2025-07-28

## 2025-07-24 RX ORDER — IPRATROPIUM BROMIDE AND ALBUTEROL SULFATE .5; 2.5 MG/3ML; MG/3ML
3 SOLUTION RESPIRATORY (INHALATION) EVERY 6 HOURS
Refills: 0 | Status: DISCONTINUED | OUTPATIENT
Start: 2025-07-24 | End: 2025-07-28

## 2025-07-24 RX ORDER — ENOXAPARIN SODIUM 100 MG/ML
40 INJECTION SUBCUTANEOUS EVERY 24 HOURS
Refills: 0 | Status: DISCONTINUED | OUTPATIENT
Start: 2025-07-24 | End: 2025-07-28

## 2025-07-24 RX ORDER — ACETAMINOPHEN 500 MG/5ML
650 LIQUID (ML) ORAL EVERY 6 HOURS
Refills: 0 | Status: DISCONTINUED | OUTPATIENT
Start: 2025-07-24 | End: 2025-07-28

## 2025-07-24 RX ORDER — SENNA 187 MG
2 TABLET ORAL AT BEDTIME
Refills: 0 | Status: DISCONTINUED | OUTPATIENT
Start: 2025-07-24 | End: 2025-07-28

## 2025-07-24 RX ORDER — VANCOMYCIN HCL IN 5 % DEXTROSE 1.5G/250ML
2000 PLASTIC BAG, INJECTION (ML) INTRAVENOUS ONCE
Refills: 0 | Status: COMPLETED | OUTPATIENT
Start: 2025-07-24 | End: 2025-07-24

## 2025-07-24 RX ORDER — CEFAZOLIN SODIUM IN 0.9 % NACL 3 G/100 ML
2000 INTRAVENOUS SOLUTION, PIGGYBACK (ML) INTRAVENOUS ONCE
Refills: 0 | Status: DISCONTINUED | OUTPATIENT
Start: 2025-07-24 | End: 2025-07-24

## 2025-07-24 RX ADMIN — ENOXAPARIN SODIUM 40 MILLIGRAM(S): 100 INJECTION SUBCUTANEOUS at 23:04

## 2025-07-24 RX ADMIN — Medication 250 MILLIGRAM(S): at 17:03

## 2025-07-24 RX ADMIN — Medication 100 MILLIGRAM(S): at 23:25

## 2025-07-24 RX ADMIN — Medication 2000 MILLIGRAM(S): at 20:00

## 2025-07-25 ENCOUNTER — TRANSCRIPTION ENCOUNTER (OUTPATIENT)
Age: 70
End: 2025-07-25

## 2025-07-25 DIAGNOSIS — D72.829 ELEVATED WHITE BLOOD CELL COUNT, UNSPECIFIED: ICD-10-CM

## 2025-07-25 DIAGNOSIS — N17.9 ACUTE KIDNEY FAILURE, UNSPECIFIED: ICD-10-CM

## 2025-07-25 LAB
ALBUMIN SERPL ELPH-MCNC: 3.3 G/DL — SIGNIFICANT CHANGE UP (ref 3.3–5)
ALP SERPL-CCNC: 126 U/L — HIGH (ref 40–120)
ALT FLD-CCNC: 27 U/L — SIGNIFICANT CHANGE UP (ref 10–45)
ANION GAP SERPL CALC-SCNC: 13 MMOL/L — SIGNIFICANT CHANGE UP (ref 5–17)
AST SERPL-CCNC: 13 U/L — SIGNIFICANT CHANGE UP (ref 10–40)
BASOPHILS # BLD AUTO: 0.05 K/UL — SIGNIFICANT CHANGE UP (ref 0–0.2)
BASOPHILS NFR BLD AUTO: 0.4 % — SIGNIFICANT CHANGE UP (ref 0–2)
BILIRUB SERPL-MCNC: 0.4 MG/DL — SIGNIFICANT CHANGE UP (ref 0.2–1.2)
BUN SERPL-MCNC: 19 MG/DL — SIGNIFICANT CHANGE UP (ref 7–23)
CALCIUM SERPL-MCNC: 9.4 MG/DL — SIGNIFICANT CHANGE UP (ref 8.4–10.5)
CHLORIDE SERPL-SCNC: 100 MMOL/L — SIGNIFICANT CHANGE UP (ref 96–108)
CO2 SERPL-SCNC: 26 MMOL/L — SIGNIFICANT CHANGE UP (ref 22–31)
CREAT SERPL-MCNC: 0.96 MG/DL — SIGNIFICANT CHANGE UP (ref 0.5–1.3)
EGFR: 84 ML/MIN/1.73M2 — SIGNIFICANT CHANGE UP
EGFR: 84 ML/MIN/1.73M2 — SIGNIFICANT CHANGE UP
EOSINOPHIL # BLD AUTO: 0.09 K/UL — SIGNIFICANT CHANGE UP (ref 0–0.5)
EOSINOPHIL NFR BLD AUTO: 0.8 % — SIGNIFICANT CHANGE UP (ref 0–6)
GLUCOSE SERPL-MCNC: 141 MG/DL — HIGH (ref 70–99)
HCT VFR BLD CALC: 39.4 % — SIGNIFICANT CHANGE UP (ref 39–50)
HGB BLD-MCNC: 12.8 G/DL — LOW (ref 13–17)
IMM GRANULOCYTES # BLD AUTO: 0.07 K/UL — SIGNIFICANT CHANGE UP (ref 0–0.07)
IMM GRANULOCYTES NFR BLD AUTO: 0.6 % — SIGNIFICANT CHANGE UP (ref 0–0.9)
LYMPHOCYTES # BLD AUTO: 0.81 K/UL — LOW (ref 1–3.3)
LYMPHOCYTES NFR BLD AUTO: 7 % — LOW (ref 13–44)
MAGNESIUM SERPL-MCNC: 2 MG/DL — SIGNIFICANT CHANGE UP (ref 1.6–2.6)
MCHC RBC-ENTMCNC: 29.8 PG — SIGNIFICANT CHANGE UP (ref 27–34)
MCHC RBC-ENTMCNC: 32.5 G/DL — SIGNIFICANT CHANGE UP (ref 32–36)
MCV RBC AUTO: 91.8 FL — SIGNIFICANT CHANGE UP (ref 80–100)
MONOCYTES # BLD AUTO: 1.15 K/UL — HIGH (ref 0–0.9)
MONOCYTES NFR BLD AUTO: 10 % — SIGNIFICANT CHANGE UP (ref 2–14)
NEUTROPHILS # BLD AUTO: 9.33 K/UL — HIGH (ref 1.8–7.4)
NEUTROPHILS NFR BLD AUTO: 81.2 % — HIGH (ref 43–77)
NRBC # BLD AUTO: 0 K/UL — SIGNIFICANT CHANGE UP (ref 0–0)
NRBC # FLD: 0 K/UL — SIGNIFICANT CHANGE UP (ref 0–0)
NRBC BLD AUTO-RTO: 0 /100 WBCS — SIGNIFICANT CHANGE UP (ref 0–0)
PHOSPHATE SERPL-MCNC: 2 MG/DL — LOW (ref 2.5–4.5)
PLATELET # BLD AUTO: 233 K/UL — SIGNIFICANT CHANGE UP (ref 150–400)
PMV BLD: 10 FL — SIGNIFICANT CHANGE UP (ref 7–13)
POTASSIUM SERPL-MCNC: 3.5 MMOL/L — SIGNIFICANT CHANGE UP (ref 3.5–5.3)
POTASSIUM SERPL-SCNC: 3.5 MMOL/L — SIGNIFICANT CHANGE UP (ref 3.5–5.3)
PROT SERPL-MCNC: 6.5 G/DL — SIGNIFICANT CHANGE UP (ref 6–8.3)
RBC # BLD: 4.29 M/UL — SIGNIFICANT CHANGE UP (ref 4.2–5.8)
RBC # FLD: 14.3 % — SIGNIFICANT CHANGE UP (ref 10.3–14.5)
SODIUM SERPL-SCNC: 139 MMOL/L — SIGNIFICANT CHANGE UP (ref 135–145)
WBC # BLD: 11.5 K/UL — HIGH (ref 3.8–10.5)
WBC # FLD AUTO: 11.5 K/UL — HIGH (ref 3.8–10.5)

## 2025-07-25 PROCEDURE — 80053 COMPREHEN METABOLIC PANEL: CPT

## 2025-07-25 PROCEDURE — 83735 ASSAY OF MAGNESIUM: CPT

## 2025-07-25 PROCEDURE — 99233 SBSQ HOSP IP/OBS HIGH 50: CPT | Mod: GC

## 2025-07-25 PROCEDURE — 80048 BASIC METABOLIC PNL TOTAL CA: CPT

## 2025-07-25 PROCEDURE — 84100 ASSAY OF PHOSPHORUS: CPT

## 2025-07-25 PROCEDURE — 36415 COLL VENOUS BLD VENIPUNCTURE: CPT

## 2025-07-25 PROCEDURE — 85025 COMPLETE CBC W/AUTO DIFF WBC: CPT

## 2025-07-25 RX ORDER — DOXYCYCLINE HYCLATE 100 MG
100 TABLET ORAL EVERY 12 HOURS
Refills: 0 | Status: DISCONTINUED | OUTPATIENT
Start: 2025-07-25 | End: 2025-07-28

## 2025-07-25 RX ORDER — DOXYCYCLINE HYCLATE 100 MG
50 TABLET ORAL EVERY 12 HOURS
Refills: 0 | Status: DISCONTINUED | OUTPATIENT
Start: 2025-07-25 | End: 2025-07-25

## 2025-07-25 RX ADMIN — Medication 100 MILLIGRAM(S): at 18:14

## 2025-07-25 RX ADMIN — Medication 100 MILLIGRAM(S): at 06:28

## 2025-07-25 RX ADMIN — Medication 100 MILLIGRAM(S): at 15:58

## 2025-07-25 RX ADMIN — Medication 100 MILLIGRAM(S): at 21:56

## 2025-07-26 LAB
HCT VFR BLD CALC: 43.3 % — SIGNIFICANT CHANGE UP (ref 39–50)
HGB BLD-MCNC: 13.9 G/DL — SIGNIFICANT CHANGE UP (ref 13–17)
MCHC RBC-ENTMCNC: 30 PG — SIGNIFICANT CHANGE UP (ref 27–34)
MCHC RBC-ENTMCNC: 32.1 G/DL — SIGNIFICANT CHANGE UP (ref 32–36)
MCV RBC AUTO: 93.5 FL — SIGNIFICANT CHANGE UP (ref 80–100)
NRBC # BLD AUTO: 0 K/UL — SIGNIFICANT CHANGE UP (ref 0–0)
NRBC # FLD: 0 K/UL — SIGNIFICANT CHANGE UP (ref 0–0)
NRBC BLD AUTO-RTO: 0 /100 WBCS — SIGNIFICANT CHANGE UP (ref 0–0)
PLATELET # BLD AUTO: 276 K/UL — SIGNIFICANT CHANGE UP (ref 150–400)
PMV BLD: 9.3 FL — SIGNIFICANT CHANGE UP (ref 7–13)
RBC # BLD: 4.63 M/UL — SIGNIFICANT CHANGE UP (ref 4.2–5.8)
RBC # FLD: 14.3 % — SIGNIFICANT CHANGE UP (ref 10.3–14.5)
WBC # BLD: 9.72 K/UL — SIGNIFICANT CHANGE UP (ref 3.8–10.5)
WBC # FLD AUTO: 9.72 K/UL — SIGNIFICANT CHANGE UP (ref 3.8–10.5)

## 2025-07-26 PROCEDURE — 99233 SBSQ HOSP IP/OBS HIGH 50: CPT | Mod: GC

## 2025-07-26 RX ORDER — VANCOMYCIN HCL IN 5 % DEXTROSE 1.5G/250ML
1000 PLASTIC BAG, INJECTION (ML) INTRAVENOUS EVERY 12 HOURS
Refills: 0 | Status: DISCONTINUED | OUTPATIENT
Start: 2025-07-26 | End: 2025-07-28

## 2025-07-26 RX ADMIN — Medication 100 MILLIGRAM(S): at 05:24

## 2025-07-26 RX ADMIN — Medication 100 MILLIGRAM(S): at 17:55

## 2025-07-26 RX ADMIN — Medication 250 MILLIGRAM(S): at 17:55

## 2025-07-26 RX ADMIN — Medication 100 MILLIGRAM(S): at 14:19

## 2025-07-26 RX ADMIN — Medication 100 MILLIGRAM(S): at 06:25

## 2025-07-27 LAB — VANCOMYCIN TROUGH SERPL-MCNC: 8.9 UG/ML — LOW (ref 10–20)

## 2025-07-27 PROCEDURE — 99232 SBSQ HOSP IP/OBS MODERATE 35: CPT | Mod: GC

## 2025-07-27 RX ADMIN — Medication 250 MILLIGRAM(S): at 18:53

## 2025-07-27 RX ADMIN — Medication 250 MILLIGRAM(S): at 06:06

## 2025-07-27 RX ADMIN — Medication 2 TABLET(S): at 22:12

## 2025-07-27 RX ADMIN — ENOXAPARIN SODIUM 40 MILLIGRAM(S): 100 INJECTION SUBCUTANEOUS at 22:12

## 2025-07-27 RX ADMIN — Medication 100 MILLIGRAM(S): at 06:07

## 2025-07-27 RX ADMIN — Medication 100 MILLIGRAM(S): at 18:51

## 2025-07-28 ENCOUNTER — TRANSCRIPTION ENCOUNTER (OUTPATIENT)
Age: 70
End: 2025-07-28

## 2025-07-28 VITALS
RESPIRATION RATE: 18 BRPM | DIASTOLIC BLOOD PRESSURE: 83 MMHG | SYSTOLIC BLOOD PRESSURE: 138 MMHG | HEART RATE: 62 BPM | TEMPERATURE: 98 F | OXYGEN SATURATION: 96 %

## 2025-07-28 LAB
ALBUMIN SERPL ELPH-MCNC: 3.4 G/DL — SIGNIFICANT CHANGE UP (ref 3.3–5)
ALP SERPL-CCNC: 174 U/L — HIGH (ref 40–120)
ALT FLD-CCNC: 54 U/L — HIGH (ref 10–45)
ANION GAP SERPL CALC-SCNC: 12 MMOL/L — SIGNIFICANT CHANGE UP (ref 5–17)
AST SERPL-CCNC: 40 U/L — SIGNIFICANT CHANGE UP (ref 10–40)
BASOPHILS # BLD AUTO: 0.11 K/UL — SIGNIFICANT CHANGE UP (ref 0–0.2)
BASOPHILS NFR BLD AUTO: 1.7 % — SIGNIFICANT CHANGE UP (ref 0–2)
BILIRUB SERPL-MCNC: 0.3 MG/DL — SIGNIFICANT CHANGE UP (ref 0.2–1.2)
BLD GP AB SCN SERPL QL: NEGATIVE — SIGNIFICANT CHANGE UP
BUN SERPL-MCNC: 22 MG/DL — SIGNIFICANT CHANGE UP (ref 7–23)
CALCIUM SERPL-MCNC: 10.2 MG/DL — SIGNIFICANT CHANGE UP (ref 8.4–10.5)
CHLORIDE SERPL-SCNC: 102 MMOL/L — SIGNIFICANT CHANGE UP (ref 96–108)
CO2 SERPL-SCNC: 26 MMOL/L — SIGNIFICANT CHANGE UP (ref 22–31)
CREAT SERPL-MCNC: 1.02 MG/DL — SIGNIFICANT CHANGE UP (ref 0.5–1.3)
EGFR: 79 ML/MIN/1.73M2 — SIGNIFICANT CHANGE UP
EGFR: 79 ML/MIN/1.73M2 — SIGNIFICANT CHANGE UP
EOSINOPHIL # BLD AUTO: 0.49 K/UL — SIGNIFICANT CHANGE UP (ref 0–0.5)
EOSINOPHIL NFR BLD AUTO: 7.5 % — HIGH (ref 0–6)
GLUCOSE SERPL-MCNC: 122 MG/DL — HIGH (ref 70–99)
HCT VFR BLD CALC: 42.1 % — SIGNIFICANT CHANGE UP (ref 39–50)
HGB BLD-MCNC: 13.6 G/DL — SIGNIFICANT CHANGE UP (ref 13–17)
IMM GRANULOCYTES # BLD AUTO: 0.19 K/UL — HIGH (ref 0–0.07)
IMM GRANULOCYTES NFR BLD AUTO: 2.9 % — HIGH (ref 0–0.9)
LYMPHOCYTES # BLD AUTO: 1.75 K/UL — SIGNIFICANT CHANGE UP (ref 1–3.3)
LYMPHOCYTES NFR BLD AUTO: 26.9 % — SIGNIFICANT CHANGE UP (ref 13–44)
MAGNESIUM SERPL-MCNC: 1.8 MG/DL — SIGNIFICANT CHANGE UP (ref 1.6–2.6)
MCHC RBC-ENTMCNC: 30.2 PG — SIGNIFICANT CHANGE UP (ref 27–34)
MCHC RBC-ENTMCNC: 32.3 G/DL — SIGNIFICANT CHANGE UP (ref 32–36)
MCV RBC AUTO: 93.3 FL — SIGNIFICANT CHANGE UP (ref 80–100)
MONOCYTES # BLD AUTO: 0.46 K/UL — SIGNIFICANT CHANGE UP (ref 0–0.9)
MONOCYTES NFR BLD AUTO: 7.1 % — SIGNIFICANT CHANGE UP (ref 2–14)
NEUTROPHILS # BLD AUTO: 3.5 K/UL — SIGNIFICANT CHANGE UP (ref 1.8–7.4)
NEUTROPHILS NFR BLD AUTO: 53.9 % — SIGNIFICANT CHANGE UP (ref 43–77)
NRBC # BLD AUTO: 0 K/UL — SIGNIFICANT CHANGE UP (ref 0–0)
NRBC # FLD: 0 K/UL — SIGNIFICANT CHANGE UP (ref 0–0)
NRBC BLD AUTO-RTO: 0 /100 WBCS — SIGNIFICANT CHANGE UP (ref 0–0)
PHOSPHATE SERPL-MCNC: 3.7 MG/DL — SIGNIFICANT CHANGE UP (ref 2.5–4.5)
PLATELET # BLD AUTO: 331 K/UL — SIGNIFICANT CHANGE UP (ref 150–400)
PMV BLD: 9 FL — SIGNIFICANT CHANGE UP (ref 7–13)
POTASSIUM SERPL-MCNC: 4.4 MMOL/L — SIGNIFICANT CHANGE UP (ref 3.5–5.3)
POTASSIUM SERPL-SCNC: 4.4 MMOL/L — SIGNIFICANT CHANGE UP (ref 3.5–5.3)
PROT SERPL-MCNC: 7 G/DL — SIGNIFICANT CHANGE UP (ref 6–8.3)
RBC # BLD: 4.51 M/UL — SIGNIFICANT CHANGE UP (ref 4.2–5.8)
RBC # FLD: 14.5 % — SIGNIFICANT CHANGE UP (ref 10.3–14.5)
RH IG SCN BLD-IMP: POSITIVE — SIGNIFICANT CHANGE UP
SODIUM SERPL-SCNC: 140 MMOL/L — SIGNIFICANT CHANGE UP (ref 135–145)
VANCOMYCIN TROUGH SERPL-MCNC: 17.6 UG/ML — SIGNIFICANT CHANGE UP (ref 10–20)
WBC # BLD: 6.5 K/UL — SIGNIFICANT CHANGE UP (ref 3.8–10.5)
WBC # FLD AUTO: 6.5 K/UL — SIGNIFICANT CHANGE UP (ref 3.8–10.5)

## 2025-07-28 PROCEDURE — 86900 BLOOD TYPING SEROLOGIC ABO: CPT

## 2025-07-28 PROCEDURE — 80048 BASIC METABOLIC PNL TOTAL CA: CPT

## 2025-07-28 PROCEDURE — 93005 ELECTROCARDIOGRAM TRACING: CPT

## 2025-07-28 PROCEDURE — 96361 HYDRATE IV INFUSION ADD-ON: CPT

## 2025-07-28 PROCEDURE — 93971 EXTREMITY STUDY: CPT

## 2025-07-28 PROCEDURE — 80053 COMPREHEN METABOLIC PANEL: CPT

## 2025-07-28 PROCEDURE — 80202 ASSAY OF VANCOMYCIN: CPT

## 2025-07-28 PROCEDURE — 86850 RBC ANTIBODY SCREEN: CPT

## 2025-07-28 PROCEDURE — 36415 COLL VENOUS BLD VENIPUNCTURE: CPT

## 2025-07-28 PROCEDURE — 99285 EMERGENCY DEPT VISIT HI MDM: CPT

## 2025-07-28 PROCEDURE — 99239 HOSP IP/OBS DSCHRG MGMT >30: CPT | Mod: GC

## 2025-07-28 PROCEDURE — 86901 BLOOD TYPING SEROLOGIC RH(D): CPT

## 2025-07-28 PROCEDURE — 96360 HYDRATION IV INFUSION INIT: CPT

## 2025-07-28 PROCEDURE — 83735 ASSAY OF MAGNESIUM: CPT

## 2025-07-28 PROCEDURE — 85025 COMPLETE CBC W/AUTO DIFF WBC: CPT

## 2025-07-28 PROCEDURE — 85027 COMPLETE CBC AUTOMATED: CPT

## 2025-07-28 PROCEDURE — 84100 ASSAY OF PHOSPHORUS: CPT

## 2025-07-28 RX ORDER — LINEZOLID 2 MG/ML
1 INJECTION, SOLUTION INTRAVENOUS
Qty: 14 | Refills: 0
Start: 2025-07-28 | End: 2025-08-03

## 2025-07-28 RX ORDER — DOXYCYCLINE HYCLATE 100 MG
1 TABLET ORAL
Qty: 14 | Refills: 0
Start: 2025-07-28 | End: 2025-08-03

## 2025-07-28 RX ORDER — MAGNESIUM SULFATE 500 MG/ML
2 SYRINGE (ML) INJECTION ONCE
Refills: 0 | Status: COMPLETED | OUTPATIENT
Start: 2025-07-28 | End: 2025-07-28

## 2025-07-28 RX ADMIN — Medication 100 MILLIGRAM(S): at 05:56

## 2025-07-28 RX ADMIN — Medication 25 GRAM(S): at 09:18

## 2025-07-28 RX ADMIN — Medication 250 MILLIGRAM(S): at 05:56

## 2025-08-04 ENCOUNTER — APPOINTMENT (OUTPATIENT)
Dept: INTERNAL MEDICINE | Facility: CLINIC | Age: 70
End: 2025-08-04
Payer: COMMERCIAL

## 2025-08-04 VITALS
HEART RATE: 70 BPM | OXYGEN SATURATION: 97 % | WEIGHT: 196 LBS | BODY MASS INDEX: 29.7 KG/M2 | SYSTOLIC BLOOD PRESSURE: 114 MMHG | DIASTOLIC BLOOD PRESSURE: 76 MMHG | HEIGHT: 68 IN | TEMPERATURE: 97.8 F

## 2025-08-04 DIAGNOSIS — L03.90 CELLULITIS, UNSPECIFIED: ICD-10-CM

## 2025-08-04 PROCEDURE — G2211 COMPLEX E/M VISIT ADD ON: CPT | Mod: NC

## 2025-08-04 PROCEDURE — 99204 OFFICE O/P NEW MOD 45 MIN: CPT | Mod: GC

## 2025-08-05 DIAGNOSIS — L03.116 CELLULITIS OF LEFT LOWER LIMB: ICD-10-CM

## 2025-08-05 DIAGNOSIS — D72.829 ELEVATED WHITE BLOOD CELL COUNT, UNSPECIFIED: ICD-10-CM

## 2025-08-05 DIAGNOSIS — J45.909 UNSPECIFIED ASTHMA, UNCOMPLICATED: ICD-10-CM

## 2025-08-05 DIAGNOSIS — Z85.038 PERSONAL HISTORY OF OTHER MALIGNANT NEOPLASM OF LARGE INTESTINE: ICD-10-CM

## 2025-08-05 DIAGNOSIS — E78.5 HYPERLIPIDEMIA, UNSPECIFIED: ICD-10-CM

## 2025-08-05 DIAGNOSIS — Z79.51 LONG TERM (CURRENT) USE OF INHALED STEROIDS: ICD-10-CM

## 2025-08-05 DIAGNOSIS — I10 ESSENTIAL (PRIMARY) HYPERTENSION: ICD-10-CM

## 2025-08-06 PROCEDURE — 80053 COMPREHEN METABOLIC PANEL: CPT

## 2025-08-06 PROCEDURE — 99285 EMERGENCY DEPT VISIT HI MDM: CPT

## 2025-08-06 PROCEDURE — 85025 COMPLETE CBC W/AUTO DIFF WBC: CPT

## 2025-08-06 PROCEDURE — 86900 BLOOD TYPING SEROLOGIC ABO: CPT

## 2025-08-06 PROCEDURE — 86901 BLOOD TYPING SEROLOGIC RH(D): CPT

## 2025-08-06 PROCEDURE — 96360 HYDRATION IV INFUSION INIT: CPT

## 2025-08-06 PROCEDURE — 83735 ASSAY OF MAGNESIUM: CPT

## 2025-08-06 PROCEDURE — 36415 COLL VENOUS BLD VENIPUNCTURE: CPT

## 2025-08-06 PROCEDURE — 84100 ASSAY OF PHOSPHORUS: CPT

## 2025-08-06 PROCEDURE — 86850 RBC ANTIBODY SCREEN: CPT

## 2025-08-06 PROCEDURE — 80202 ASSAY OF VANCOMYCIN: CPT

## 2025-08-06 PROCEDURE — 85027 COMPLETE CBC AUTOMATED: CPT

## 2025-08-06 PROCEDURE — 80048 BASIC METABOLIC PNL TOTAL CA: CPT

## 2025-08-06 PROCEDURE — 96361 HYDRATE IV INFUSION ADD-ON: CPT

## 2025-08-06 PROCEDURE — 93005 ELECTROCARDIOGRAM TRACING: CPT

## 2025-08-06 PROCEDURE — 93971 EXTREMITY STUDY: CPT

## (undated) DEVICE — TROCAR APPLIED MEDICAL KII FIOS FIRST ENTRY 12MM X 100MM ADVANCED FIXATION

## (undated) DEVICE — FOLEY TRAY 16FR 5CC LF UMETER CLOSED

## (undated) DEVICE — SUT VICRYL 0 18" ENDOLOOP LIGATURE

## (undated) DEVICE — SUT SILK 2-0 30" TIES

## (undated) DEVICE — GLV 7.5 DERMAPRENE ULTRA

## (undated) DEVICE — GLV 7.5 PROTEXIS (WHITE)

## (undated) DEVICE — SUT PDS II 1 48" TP-1

## (undated) DEVICE — POSITIONER FOAM EGG CRATE ULNAR 2PCS (PINK)

## (undated) DEVICE — GLV 8 PROTEXIS (WHITE)

## (undated) DEVICE — GOWN XXL

## (undated) DEVICE — GLV 7 PROTEXIS (WHITE)

## (undated) DEVICE — FOLEY CATH 2-WAY 20FR 30CC UNCOATED SILICONE

## (undated) DEVICE — Device

## (undated) DEVICE — LIGASURE L-HOOK 37CM

## (undated) DEVICE — LIGASURE MARYLAND 37CM

## (undated) DEVICE — TIP METZENBAUM SCISSOR MONOPOLAR ENDOCUT (ORANGE)

## (undated) DEVICE — SUT SILK 3-0 30" SH

## (undated) DEVICE — GLV 8.5 PROTEXIS (WHITE)

## (undated) DEVICE — PACK GENERAL CLOSING

## (undated) DEVICE — VENODYNE/SCD SLEEVE CALF MEDIUM

## (undated) DEVICE — DRAPE 3/4 SHEET 52X76"

## (undated) DEVICE — DRSG TAPE UMBILICAL COTTON 2" X 30 X 1/8"

## (undated) DEVICE — LIGASURE IMPACT

## (undated) DEVICE — PACK PROC UNIV MAYO STAND 29118

## (undated) DEVICE — SUT VICRYL PLUS 2-0 27" SH UNDYED

## (undated) DEVICE — TUBING TUR 2 PRONG

## (undated) DEVICE — TUBING STRYKER PNEUMOSURE HI FLOW INSUFFLATOR

## (undated) DEVICE — SUT PROLENE 4-0 36" SH

## (undated) DEVICE — PACK EXPLORATORY LAPAROTOMY

## (undated) DEVICE — SUT PROLENE 5-0 30" RB-1

## (undated) DEVICE — GLV 7 PROTEXIS ORTHO (CREAM)

## (undated) DEVICE — SUT PDS II 0 27" CT-1

## (undated) DEVICE — SUT SILK 3-0 18" SH (POP-OFF)

## (undated) DEVICE — TROCAR APPLIED MEDICAL KII FIOS FIRST ENTRY 5MM X 100MM ADVANCED FIXATION

## (undated) DEVICE — MARKING PEN W RULER

## (undated) DEVICE — SUT PDS II PLUS 1 36" CTX

## (undated) DEVICE — SUT PROLENE 2-0 36" SH

## (undated) DEVICE — FOLEY CATH 2-WAY 24FR 30CC SILICONE

## (undated) DEVICE — SOL IRR BAG NS 0.9% 3000ML

## (undated) DEVICE — TROCAR COVIDIEN VERSAONE FIXATION CANNULA 5MM

## (undated) DEVICE — DRAIN JACKSON PRATT 19FR ROUND END W TROCAR

## (undated) DEVICE — PACK CYSTO

## (undated) DEVICE — TROCAR APPLIED MEDICAL KII BALLOON BLUNT TIP 12MM X 100MM

## (undated) DEVICE — TAPE SILK 3"

## (undated) DEVICE — INSUFFLATION NDL COVIDIEN SURGINEEDLE VERESS 120MM

## (undated) DEVICE — STAPLER COVIDIEN ENDO GIA STANDARD HANDLE

## (undated) DEVICE — WARMING BLANKET UPPER ADULT

## (undated) DEVICE — UROVAC

## (undated) DEVICE — SUT VICRYL 0 27" UR-6

## (undated) DEVICE — SUT VICRYL 2-0 18" TIES

## (undated) DEVICE — PACK GENERAL LAPAROSCOPY

## (undated) DEVICE — GOWN IMPERV BREATHABLE XL

## (undated) DEVICE — SUT PDS II 1 27" CT-1

## (undated) DEVICE — ELCTR GROUNDING PAD ADULT COVIDIEN